# Patient Record
Sex: FEMALE | Race: WHITE | NOT HISPANIC OR LATINO | Employment: FULL TIME | URBAN - METROPOLITAN AREA
[De-identification: names, ages, dates, MRNs, and addresses within clinical notes are randomized per-mention and may not be internally consistent; named-entity substitution may affect disease eponyms.]

---

## 2017-03-27 ENCOUNTER — HOSPITAL ENCOUNTER (EMERGENCY)
Facility: HOSPITAL | Age: 42
Discharge: HOME | End: 2017-03-27
Attending: EMERGENCY MEDICINE
Payer: MEDICAID

## 2017-03-27 VITALS
DIASTOLIC BLOOD PRESSURE: 86 MMHG | WEIGHT: 120 LBS | TEMPERATURE: 98 F | BODY MASS INDEX: 22.08 KG/M2 | HEART RATE: 102 BPM | RESPIRATION RATE: 18 BRPM | HEIGHT: 62 IN | SYSTOLIC BLOOD PRESSURE: 142 MMHG | OXYGEN SATURATION: 99 %

## 2017-03-27 DIAGNOSIS — N39.0 URINARY TRACT INFECTION WITHOUT HEMATURIA, SITE UNSPECIFIED: ICD-10-CM

## 2017-03-27 DIAGNOSIS — F10.10 ETOH ABUSE: ICD-10-CM

## 2017-03-27 DIAGNOSIS — F32.A DEPRESSION, UNSPECIFIED DEPRESSION TYPE: Primary | ICD-10-CM

## 2017-03-27 LAB
ALBUMIN SERPL BCP-MCNC: 4.1 G/DL
ALP SERPL-CCNC: 107 U/L
ALT SERPL W/O P-5'-P-CCNC: 95 U/L
AMPHET+METHAMPHET UR QL: NEGATIVE
ANION GAP SERPL CALC-SCNC: 13 MMOL/L
APAP SERPL-MCNC: <3 UG/ML
AST SERPL-CCNC: 248 U/L
B-HCG UR QL: NEGATIVE
BACTERIA #/AREA URNS HPF: ABNORMAL /HPF
BARBITURATES UR QL SCN>200 NG/ML: NEGATIVE
BASOPHILS # BLD AUTO: 0.1 K/UL
BASOPHILS NFR BLD: 0.9 %
BENZODIAZ UR QL SCN>200 NG/ML: NEGATIVE
BILIRUB SERPL-MCNC: 0.4 MG/DL
BILIRUB UR QL STRIP: NEGATIVE
BUN SERPL-MCNC: 7 MG/DL
BZE UR QL SCN: NORMAL
CALCIUM SERPL-MCNC: 9.1 MG/DL
CANNABINOIDS UR QL SCN: NORMAL
CHLORIDE SERPL-SCNC: 106 MMOL/L
CLARITY UR: ABNORMAL
CO2 SERPL-SCNC: 25 MMOL/L
COLOR UR: YELLOW
CREAT SERPL-MCNC: 0.7 MG/DL
CREAT UR-MCNC: 101.8 MG/DL
CTP QC/QA: YES
DIFFERENTIAL METHOD: ABNORMAL
EOSINOPHIL # BLD AUTO: 0 K/UL
EOSINOPHIL NFR BLD: 0.6 %
ERYTHROCYTE [DISTWIDTH] IN BLOOD BY AUTOMATED COUNT: 13.1 %
EST. GFR  (AFRICAN AMERICAN): >60 ML/MIN/1.73 M^2
EST. GFR  (NON AFRICAN AMERICAN): >60 ML/MIN/1.73 M^2
ETHANOL SERPL-MCNC: 266 MG/DL
GLUCOSE SERPL-MCNC: 106 MG/DL
GLUCOSE UR QL STRIP: NEGATIVE
HCT VFR BLD AUTO: 46.6 %
HGB BLD-MCNC: 16.3 G/DL
HGB UR QL STRIP: ABNORMAL
KETONES UR QL STRIP: NEGATIVE
LEUKOCYTE ESTERASE UR QL STRIP: ABNORMAL
LYMPHOCYTES # BLD AUTO: 3.1 K/UL
LYMPHOCYTES NFR BLD: 51 %
MCH RBC QN AUTO: 35.3 PG
MCHC RBC AUTO-ENTMCNC: 34.9 %
MCV RBC AUTO: 101 FL
METHADONE UR QL SCN>300 NG/ML: NEGATIVE
MICROSCOPIC COMMENT: ABNORMAL
MONOCYTES # BLD AUTO: 0.6 K/UL
MONOCYTES NFR BLD: 10.3 %
NEUTROPHILS # BLD AUTO: 2.3 K/UL
NEUTROPHILS NFR BLD: 37.2 %
NITRITE UR QL STRIP: POSITIVE
OPIATES UR QL SCN: NORMAL
PCP UR QL SCN>25 NG/ML: NEGATIVE
PH UR STRIP: 7 [PH] (ref 5–8)
PLATELET # BLD AUTO: 264 K/UL
PMV BLD AUTO: 7.7 FL
POTASSIUM SERPL-SCNC: 3.2 MMOL/L
PROT SERPL-MCNC: 8 G/DL
PROT UR QL STRIP: ABNORMAL
RBC # BLD AUTO: 4.61 M/UL
RBC #/AREA URNS HPF: 5 /HPF (ref 0–4)
SODIUM SERPL-SCNC: 144 MMOL/L
SP GR UR STRIP: 1.01 (ref 1–1.03)
TOXICOLOGY INFORMATION: NORMAL
TSH SERPL DL<=0.005 MIU/L-ACNC: 0.6 UIU/ML
URN SPEC COLLECT METH UR: ABNORMAL
UROBILINOGEN UR STRIP-ACNC: ABNORMAL EU/DL
WBC # BLD AUTO: 6.1 K/UL
WBC #/AREA URNS HPF: 25 /HPF (ref 0–5)

## 2017-03-27 PROCEDURE — 99285 EMERGENCY DEPT VISIT HI MDM: CPT | Mod: 25

## 2017-03-27 PROCEDURE — 93005 ELECTROCARDIOGRAM TRACING: CPT

## 2017-03-27 PROCEDURE — 80329 ANALGESICS NON-OPIOID 1 OR 2: CPT

## 2017-03-27 PROCEDURE — 85025 COMPLETE CBC W/AUTO DIFF WBC: CPT

## 2017-03-27 PROCEDURE — 82570 ASSAY OF URINE CREATININE: CPT

## 2017-03-27 PROCEDURE — 81025 URINE PREGNANCY TEST: CPT | Performed by: EMERGENCY MEDICINE

## 2017-03-27 PROCEDURE — 81000 URINALYSIS NONAUTO W/SCOPE: CPT

## 2017-03-27 PROCEDURE — 80053 COMPREHEN METABOLIC PANEL: CPT

## 2017-03-27 PROCEDURE — 36415 COLL VENOUS BLD VENIPUNCTURE: CPT

## 2017-03-27 PROCEDURE — 80320 DRUG SCREEN QUANTALCOHOLS: CPT

## 2017-03-27 PROCEDURE — 84443 ASSAY THYROID STIM HORMONE: CPT

## 2017-03-27 RX ORDER — TRAZODONE HYDROCHLORIDE 100 MG/1
100 TABLET ORAL NIGHTLY
Qty: 30 TABLET | Refills: 1 | Status: SHIPPED | OUTPATIENT
Start: 2017-03-27 | End: 2018-05-09

## 2017-03-27 RX ORDER — CITALOPRAM 20 MG/1
20 TABLET, FILM COATED ORAL DAILY
Qty: 30 TABLET | Refills: 1 | Status: SHIPPED | OUTPATIENT
Start: 2017-03-27 | End: 2021-02-15

## 2017-03-27 RX ORDER — OLANZAPINE 20 MG/1
20 TABLET ORAL NIGHTLY
Qty: 30 TABLET | Refills: 1 | Status: SHIPPED | OUTPATIENT
Start: 2017-03-27 | End: 2018-05-09

## 2017-03-27 NOTE — ED PROVIDER NOTES
"Encounter Date: 3/27/2017       History     Chief Complaint   Patient presents with    Suicidal     Thoughts of being better off dead.  "i pray at night not to wake up".  Been off psych medications for over a year.   Admits to frequent audible and visual hallucinations      Review of patient's allergies indicates:  No Known Allergies  HPI   Patient is a 42-year-old woman who presents to emergency department for evaluation of depression, having anger outbursts and thinking she would be better off dead sometimes at night before going to bed.  She endorses a history of previous suicide attempts but denies any true suicidal ideation or plans.  She denies any homicidal ideation.  She does endorse alcohol abuse.  She admits to audible and visual hallucinations in the past but none currently.  Past Medical History:   Diagnosis Date    Anxiety     Bipolar 1 disorder     Depression     Major depression     Schizophrenia      History reviewed. No pertinent surgical history.  History reviewed. No pertinent family history.  Social History   Substance Use Topics    Smoking status: Current Every Day Smoker     Packs/day: 1.00    Smokeless tobacco: None    Alcohol use Yes     Review of Systems  REVIEW OF SYSTEMS  CONSTITUTIONAL: Negative for fever.  HEENT:  Negative for sore throat.   HEART:   Negative for chest pain..  LUNG:  Negative for shortness of breath.  ABDOMEN:  Negative for nausea.   :  No discharge, dysuria  EXTREMITIES:  No swelling  NEURO:  Negative for weakness.   SKIN:  Negative for rash.  Psych: Positive for depression, positive for suicidal thoughts.  Negative for plan.  HEME: Does not bruise/bleed easily.           Physical Exam   Initial Vitals   BP Pulse Resp Temp SpO2   03/27/17 0337 03/27/17 0337 03/27/17 0337 03/27/17 0337 03/27/17 0337   153/101 118 18 98.3 °F (36.8 °C) 99 %     Physical Exam  Physical Exam   Nursing note and vitals reviewed.   Constitutional: Oriented to person, place, and time. " Appears well-developed and well-nourished.  Intoxicated.  HENT:   Head: Normocephalic and atraumatic.   Eyes: EOM are normal.   Neck: Normal range of motion. Neck supple.   Cardiovascular: Normal rate.   Pulmonary/Chest: Effort normal. Clear BS b/l   Abdominal: Soft, Non tender, no distension.   Musculoskeletal: Normal range of motion.   Neurological:Alert and oriented to person, place, and time.   Psychiatric: Normal mood and affect.  Endorses passive suicidal ideations.  Denies plan.  States she does not think that she will hurt herself or anyone else and would like to get back on her psychiatric meds.  Appropriate insight.        ED Course   Procedures  Labs Reviewed   CBC W/ AUTO DIFFERENTIAL - Abnormal; Notable for the following:        Result Value    Hemoglobin 16.3 (*)      (*)     MCH 35.3 (*)     MPV 7.7 (*)     Gran% 37.2 (*)     Lymph% 51.0 (*)     All other components within normal limits   COMPREHENSIVE METABOLIC PANEL - Abnormal; Notable for the following:     Potassium 3.2 (*)      (*)     ALT 95 (*)     All other components within normal limits   URINALYSIS - Abnormal; Notable for the following:     Appearance, UA Hazy (*)     Protein, UA Trace (*)     Occult Blood UA 1+ (*)     Nitrite, UA Positive (*)     Urobilinogen, UA 2.0-3.0 (*)     Leukocytes, UA Trace (*)     All other components within normal limits   ALCOHOL,MEDICAL (ETHANOL) - Abnormal; Notable for the following:     Alcohol, Medical, Serum 266 (*)     All other components within normal limits   ACETAMINOPHEN LEVEL - Abnormal; Notable for the following:     Acetaminophen (Tylenol), Serum <3.0 (*)     All other components within normal limits   URINALYSIS MICROSCOPIC - Abnormal; Notable for the following:     RBC, UA 5 (*)     WBC, UA 25 (*)     Bacteria, UA Many (*)     All other components within normal limits   TSH   DRUG SCREEN PANEL, URINE EMERGENCY   POCT URINE PREGNANCY     EKG Readings: (Independently Interpreted)    Initial Reading: No STEMI.   Normal sinus rhythm, 94, normal ST segments, normal T waves, no acute ischemic changes appreciated.          Medical Decision Making:   History:   Old Medical Records: I decided to obtain old medical records.  Initial Assessment:   42-year-old woman presents with depression and passive suicidal ideations with alcohol intoxication.  Medical workup was initiated for psychiatric clearance and possible PEC.  However, patient came to the ER voluntarily and denies true suicidal ideation or plan.  She wants to restart taking her psychiatric medications.  She is informed that continuing use of alcohol is a depressant and can worsen her symptoms and states she will consider abstaining from alcohol.  She does not want to be PEC and states she will return if she has worsening of her suicidal thoughts.  I provided her with a prescription for her medications.  She is to follow-up with her therapist.  Return for any worsening symptoms.  I do not think she is truly suicidal, homicidal or gravely disabled.  She is discharged improved and in no acute distress.                   ED Course     Clinical Impression:   The primary encounter diagnosis was Depression, unspecified depression type. Diagnoses of ETOH abuse and Urinary tract infection without hematuria, site unspecified were also pertinent to this visit.          Harrison Cevallos MD  03/27/17 0752

## 2017-03-27 NOTE — DISCHARGE INSTRUCTIONS
"  Alcohol Abuse  Alcoholic drinks are harmful when you have too many of them. There is no set number of drinks that defines too much. Drinking that disrupts your life or your health is called alcohol abuse. Alcohol abuse can hurt your relationships with others. You may lose friends, a spouse, or even your job. You may be abusing alcohol if any of the following are true for you:  · Duties at home or with  suffer because of drinking.  · Duties at work or in school suffer because of drinking.  · You have missed work or school because of drinking.  · You use alcohol while driving or operating machinery.  · You have legal problems such as arrests due to drinking.  · You keep drinking even though it causes serious problems in your life.  Health effects  Alcohol abuse causes health problems. Sometimes this can happen after only drinking a little." There is no set number of drinks or amount of alcohol that defines too much. The more you drink at one time, and the more often you drink determine both the short-term and long-term health effects. It affects all parts of your body and your health, including your:  · Brain. Alcohol is a central nervous system depressant. It can damage parts of the brain that affect your balance, memory, thinking, and emotions. It can cause memory loss, blackouts, depression, agitation, sleep cycle changes, and seizures. These changes may or may not be reversible.  · Heart and vascular system. Alcohol affects multiple areas. It can damage heart muscle causing cardiomyopathy, which is a weakening and stretching of the heart muscle. This can lead to trouble breathing, an irregular heartbeat, atrial fibrillation, leg swelling, and heart failure. Alcohol use makes the blood vessels stiffen causing high blood pressure. All of these problems increase your risk of having heart attacks or strokes.  · Liver. Alcohol causes fat to build up in the liver, affecting its normal function. This " increases the risk for hepatitis, leading to abdominal pain, appetite loss, jaundice, bleeding problems, liver fibrosis, and cirrhosis. This, in turn, can affect your ability to fight off infections, and can cause diabetes. The liver changes prevent it from removing toxins in your blood that can cause encephalopathy, which may show with confusion, altered level of consciousness, personality changes, memory loss, seizures, coma, and death.  · Pancreas. Alcohol can cause inflammation of the pancreas, or pancreatitis. This can cause abdominal pain, fever, and diabetes.  · Immune system. Alcohol weakens your immune system in a number of ways. It suppresses your immune system making it harder to fight infections and colds. It also increases the chance of getting pneumonia and tuberculosis.  · Cancer. Alcohol is a risk factor for developing cancer of the mouth, esophagus, pharynx, larynx, liver, and breast.  · Sexual function. Alcohol can lead to sexual problems.  Home care  The following guidelines will help you deal with alcohol abuse:  · Admit you have a problem with alcohol.  · Ask for help from your healthcare provider and trusted family members or close friends.  · Get help from people trained in dealing with alcohol abuse. This may be individual counseling or group therapy, or it may be a supervised alcohol treatment program.  · Join a self-help group for alcohol abuse such as Alcoholics Anonymous (AA).  · Avoid people who abuse alcohol or tempt you to drink.  Follow-up care  Follow up as advised by the healthcare provider, or as advised. Contact these groups to get help:  · Alcoholics Anonymous (AA): Go to www.aa.org or check the phone book for meetings near you.  · National Alcohol and Substance Abuse Information Center (NASAIC): 887.286.4519 www.addictioncareoptions.com  · National [place] on Alcoholism and Drug Dependence (NCADD): 923-JFL-UGUS (205-3555) www.ncadd.org  Call 911  Call 911 if any of these  occur:  · Trouble breathing or slow irregular breathing  · Chest pain  · Sudden weakness on one side of your body or sudden trouble speaking  · Heavy bleeding or vomiting blood  · Very drowsy or trouble awakening  · Fainting or loss of consciousness  · Rapid heart rate  · Seizure  When to seek medical care  Call your healthcare provider right away if any of these occur:   · Confusion  · Hallucinations (seeing, hearing, or feeling things that arent there)  · Pain in your upper abdomen that gets worse  · Repeated vomiting or black or tarry stools  · Severe shakiness  Date Last Reviewed: 6/1/2016  © 1855-1337 [employer]. [address] [place] [address]. All rights reserved. This information is not intended as a substitute for professional medical care. Always follow your healthcare professional's instructions.

## 2017-03-27 NOTE — ED AVS SNAPSHOT
[facility] [address] [place] [address]               Carmen Landon   3/27/2017  3:33 AM   ED    Description:  Female : 1975   Department:  [facility]           Your Care was Coordinated By:     Provider Role From To    Harrison Cevallos MD Attending Provider 17 0338 --      Reason for Visit     Suicidal           Diagnoses this Visit        Comments    Depression, unspecified depression type    -  Primary     ETOH abuse         Urinary tract infection without hematuria, site unspecified           ED Disposition     None           To Do List           Follow-up Information     Schedule an appointment as soon as possible for a visit with [facility].    Specialties:  Physical Therapy, Behavioral Health, Geriatric Medicine    Contact information:    [address] [place] [address]  [address]  [facility] [address]  [phone]          Follow up with [facility].    Specialty:  Emergency Medicine    Why:  As needed, If symptoms worsen    Contact information:    [address] [place] [id number] [phone]       These Medications        Disp Refills Start End    citalopram (CELEXA) 20 MG tablet 30 tablet 1 3/27/2017 3/27/2018    Take 1 tablet (20 mg total) by mouth once daily. - Oral    olanzapine (ZYPREXA) 20 MG tablet 30 tablet 1 3/27/2017 3/27/2018    Take 1 tablet (20 mg total) by mouth every evening. - Oral    trazodone (DESYREL) 100 MG tablet 30 tablet 1 3/27/2017 3/27/2018    Take 1 tablet (100 mg total) by mouth every evening. - Oral      [facility]     [facility] -  Assistance  Registered nurses in the [facility] provide clinical advisement, health education, appointment booking, and other advisory services.  Call for this free service at [phone].             Medications           Message regarding Medications      "Verify the changes and/or additions to your medication regime listed below are the same as discussed with your clinician today.  If any of these changes or additions are incorrect, please notify your healthcare provider.        START taking these NEW medications        Refills    citalopram (CELEXA) 20 MG tablet 1    Sig: Take 1 tablet (20 mg total) by mouth once daily.    Class: Print    Route: Oral    olanzapine (ZYPREXA) 20 MG tablet 1    Sig: Take 1 tablet (20 mg total) by mouth every evening.    Class: Print    Route: Oral    trazodone (DESYREL) 100 MG tablet 1    Sig: Take 1 tablet (100 mg total) by mouth every evening.    Class: Print    Route: Oral           Verify that the below list of medications is an accurate representation of the medications you are currently taking.  If none reported, the list may be blank. If incorrect, please contact your healthcare provider. Carry this list with you in case of emergency.           Current Medications     citalopram (CELEXA) 20 MG tablet Take 1 tablet (20 mg total) by mouth once daily.    olanzapine (ZYPREXA) 20 MG tablet Take 1 tablet (20 mg total) by mouth every evening.    trazodone (DESYREL) 100 MG tablet Take 1 tablet (100 mg total) by mouth every evening.           Clinical Reference Information           Your Vitals Were     BP Pulse Temp Resp Height Weight    153/101 (BP Location: Right arm, Patient Position: Sitting) 118 98.3 °F (36.8 °C) (Oral) 18 5' 2" (1.575 m) 54.4 kg (120 lb)    SpO2 BMI             99% 21.95 kg/m2         Allergies as of 3/27/2017     No Known Allergies      Immunizations Administered on Date of Encounter - 3/27/2017     None      ED Micro, Lab, POCT     Start Ordered       Status Ordering Provider    03/27/17 0344 03/27/17 0344  CBC auto differential  STAT      Final result     03/27/17 0344 03/27/17 0344  Comprehensive metabolic panel  STAT      Final result     03/27/17 0344 03/27/17 0344  TSH  STAT      In process     03/27/17 " "0344 03/27/17 0344  Urinalysis - clean catch  STAT      Final result     03/27/17 0344 03/27/17 0344  Drug screen panel, emergency  STAT      Final result     03/27/17 0344 03/27/17 0344  Ethanol  STAT      Final result     03/27/17 0344 03/27/17 0344  Acetaminophen level  STAT      Final result     03/27/17 0344 03/27/17 0344  POCT urine pregnancy  Once      Final result     03/27/17 0344 03/27/17 0344  Urinalysis Microscopic  Once      Final result       ED Imaging Orders     Start Ordered       Status Ordering Provider    03/27/17 0345 03/27/17 0344  X-Ray Chest PA And Lateral  1 time imaging      In process         Discharge Instructions         Alcohol Abuse  Alcoholic drinks are harmful when you have too many of them. There is no set number of drinks that defines too much. Drinking that disrupts your life or your health is called alcohol abuse. Alcohol abuse can hurt your relationships with others. You may lose friends, a spouse, or even your job. You may be abusing alcohol if any of the following are true for you:  · Duties at home or with  suffer because of drinking.  · Duties at work or in school suffer because of drinking.  · You have missed work or school because of drinking.  · You use alcohol while driving or operating machinery.  · You have legal problems such as arrests due to drinking.  · You keep drinking even though it causes serious problems in your life.  Health effects  Alcohol abuse causes health problems. Sometimes this can happen after only drinking a little." There is no set number of drinks or amount of alcohol that defines too much. The more you drink at one time, and the more often you drink determine both the short-term and long-term health effects. It affects all parts of your body and your health, including your:  · Brain. Alcohol is a central nervous system depressant. It can damage parts of the brain that affect your balance, memory, thinking, and emotions. It can cause " memory loss, blackouts, depression, agitation, sleep cycle changes, and seizures. These changes may or may not be reversible.  · Heart and vascular system. Alcohol affects multiple areas. It can damage heart muscle causing cardiomyopathy, which is a weakening and stretching of the heart muscle. This can lead to trouble breathing, an irregular heartbeat, atrial fibrillation, leg swelling, and heart failure. Alcohol use makes the blood vessels stiffen causing high blood pressure. All of these problems increase your risk of having heart attacks or strokes.  · Liver. Alcohol causes fat to build up in the liver, affecting its normal function. This increases the risk for hepatitis, leading to abdominal pain, appetite loss, jaundice, bleeding problems, liver fibrosis, and cirrhosis. This, in turn, can affect your ability to fight off infections, and can cause diabetes. The liver changes prevent it from removing toxins in your blood that can cause encephalopathy, which may show with confusion, altered level of consciousness, personality changes, memory loss, seizures, coma, and death.  · Pancreas. Alcohol can cause inflammation of the pancreas, or pancreatitis. This can cause abdominal pain, fever, and diabetes.  · Immune system. Alcohol weakens your immune system in a number of ways. It suppresses your immune system making it harder to fight infections and colds. It also increases the chance of getting pneumonia and tuberculosis.  · Cancer. Alcohol is a risk factor for developing cancer of the mouth, esophagus, pharynx, larynx, liver, and breast.  · Sexual function. Alcohol can lead to sexual problems.  Home care  The following guidelines will help you deal with alcohol abuse:  · Admit you have a problem with alcohol.  · Ask for help from your healthcare provider and trusted family members or close friends.  · Get help from people trained in dealing with alcohol abuse. This may be individual counseling or group therapy, or  it may be a supervised alcohol treatment program.  · Join a self-help group for alcohol abuse such as Alcoholics Anonymous (AA).  · Avoid people who abuse alcohol or tempt you to drink.  Follow-up care  Follow up as advised by the healthcare provider, or as advised. Contact these groups to get help:  · Alcoholics Anonymous (AA): Go to www.aa.org or check the phone book for meetings near you.  · National Alcohol and Substance Abuse Information Center (NASAIC): 380.669.5518 www.addictioncareThirdSpaceLearning.[employer]  · National [place] on Alcoholism and Drug Dependence (NCADD): 336-RDI-GORY (809-7916) www.ncadd.org  Call 911  Call 911 if any of these occur:  · Trouble breathing or slow irregular breathing  · Chest pain  · Sudden weakness on one side of your body or sudden trouble speaking  · Heavy bleeding or vomiting blood  · Very drowsy or trouble awakening  · Fainting or loss of consciousness  · Rapid heart rate  · Seizure  When to seek medical care  Call your healthcare provider right away if any of these occur:   · Confusion  · Hallucinations (seeing, hearing, or feeling things that arent there)  · Pain in your upper abdomen that gets worse  · Repeated vomiting or black or tarry stools  · Severe shakiness  Date Last Reviewed: 6/1/2016  © 9247-5994 [employer]. [address] [place] [address]. All rights reserved. This information is not intended as a substitute for professional medical care. Always follow your healthcare professional's instructions.          Discharge References/Attachments     DEPRESSION (ENGLISH)    DEPRESSION: TIPS TO HELP YOURSELF  (ENGLISH)      [facility] Sign-Up     Activating your [facility] account is as easy as 1-2-3!     1) Visit [email or web address], select Sign Up Now, enter this activation code and your date of birth, then select Next.  [email or web address]  Expires: 5/11/2017  5:09 AM      2) Create a username and password to use when you visit [facility] in the future and select a  security question in case you lose your password and select Next.    3) Enter your e-mail address and click Sign Up!    Additional Information  If you have questions, please e-mail [email or web address] or call [phone] to talk to our [facility] staff. Remember, [facility] is NOT to be used for urgent needs. For medical emergencies, dial 911.         Smoking Cessation     If you would like to quit smoking:   You may be eligible for free services if you are a [place] resident and started smoking cigarettes before September 1, 1988.  Call the Smoking Cessation Trust (SCT) toll free at (469) 397-3394 or (423) 969-7136.   Call 2-386-QUIT-NOW if you do not meet the above criteria.             [facility] complies with applicable Federal civil rights laws and does not discriminate on the basis of race, color, national origin, age, disability, or sex.        Language Assistance Services     ATTENTION: Language assistance services are available, free of charge. Please call [phone].      ATENCIÓN: Si habla español, tiene a porter disposición servicios gratuitos de asistencia lingüística. Llame al [phone].     CHÚ Ý: N?u b?n nói Ti?ng Vi?t, có các d?ch v? h? tr? ngôn ng? mi?n phí dành cho b?n. G?i s? [phone].

## 2017-06-29 ENCOUNTER — HOSPITAL ENCOUNTER (EMERGENCY)
Facility: HOSPITAL | Age: 42
Discharge: HOME | End: 2017-06-29
Attending: EMERGENCY MEDICINE
Payer: MEDICAID

## 2017-06-29 VITALS
SYSTOLIC BLOOD PRESSURE: 116 MMHG | BODY MASS INDEX: 23.37 KG/M2 | DIASTOLIC BLOOD PRESSURE: 70 MMHG | HEIGHT: 62 IN | RESPIRATION RATE: 16 BRPM | OXYGEN SATURATION: 94 % | TEMPERATURE: 99 F | WEIGHT: 127 LBS | HEART RATE: 82 BPM

## 2017-06-29 DIAGNOSIS — F10.920 ALCOHOL INTOXICATION, UNCOMPLICATED: Primary | ICD-10-CM

## 2017-06-29 LAB
ALBUMIN SERPL BCP-MCNC: 4 G/DL
ALP SERPL-CCNC: 87 U/L
ALT SERPL W/O P-5'-P-CCNC: 66 U/L
ANION GAP SERPL CALC-SCNC: 15 MMOL/L
AST SERPL-CCNC: 155 U/L
BASOPHILS # BLD AUTO: 0 K/UL
BASOPHILS NFR BLD: 0.1 %
BILIRUB SERPL-MCNC: 0.3 MG/DL
BUN SERPL-MCNC: 8 MG/DL
CALCIUM SERPL-MCNC: 9.1 MG/DL
CHLORIDE SERPL-SCNC: 106 MMOL/L
CO2 SERPL-SCNC: 22 MMOL/L
CREAT SERPL-MCNC: 0.7 MG/DL
DIFFERENTIAL METHOD: ABNORMAL
EOSINOPHIL # BLD AUTO: 0.1 K/UL
EOSINOPHIL NFR BLD: 0.8 %
ERYTHROCYTE [DISTWIDTH] IN BLOOD BY AUTOMATED COUNT: 11.8 %
EST. GFR  (AFRICAN AMERICAN): >60 ML/MIN/1.73 M^2
EST. GFR  (NON AFRICAN AMERICAN): >60 ML/MIN/1.73 M^2
GLUCOSE SERPL-MCNC: 78 MG/DL
HCT VFR BLD AUTO: 42.2 %
HGB BLD-MCNC: 14.6 G/DL
LYMPHOCYTES # BLD AUTO: 4.1 K/UL
LYMPHOCYTES NFR BLD: 52.5 %
MCH RBC QN AUTO: 35.4 PG
MCHC RBC AUTO-ENTMCNC: 34.6 %
MCV RBC AUTO: 102 FL
MONOCYTES # BLD AUTO: 0.5 K/UL
MONOCYTES NFR BLD: 6.7 %
NEUTROPHILS # BLD AUTO: 3.1 K/UL
NEUTROPHILS NFR BLD: 39.9 %
PLATELET # BLD AUTO: 296 K/UL
PMV BLD AUTO: 7.2 FL
POTASSIUM SERPL-SCNC: 3.5 MMOL/L
PROT SERPL-MCNC: 8 G/DL
RBC # BLD AUTO: 4.12 M/UL
SODIUM SERPL-SCNC: 143 MMOL/L
WBC # BLD AUTO: 7.8 K/UL

## 2017-06-29 PROCEDURE — 25000003 PHARM REV CODE 250: Performed by: EMERGENCY MEDICINE

## 2017-06-29 PROCEDURE — 96360 HYDRATION IV INFUSION INIT: CPT

## 2017-06-29 PROCEDURE — 85025 COMPLETE CBC W/AUTO DIFF WBC: CPT

## 2017-06-29 PROCEDURE — 80053 COMPREHEN METABOLIC PANEL: CPT

## 2017-06-29 PROCEDURE — 36415 COLL VENOUS BLD VENIPUNCTURE: CPT

## 2017-06-29 PROCEDURE — 96361 HYDRATE IV INFUSION ADD-ON: CPT

## 2017-06-29 PROCEDURE — 99284 EMERGENCY DEPT VISIT MOD MDM: CPT | Mod: 25

## 2017-06-29 PROCEDURE — 63600175 PHARM REV CODE 636 W HCPCS: Performed by: EMERGENCY MEDICINE

## 2017-06-29 RX ADMIN — FOLIC ACID 1000 ML/HR: 5 INJECTION, SOLUTION INTRAMUSCULAR; INTRAVENOUS; SUBCUTANEOUS at 02:06

## 2017-06-29 NOTE — ED NOTES
Pt presents to ER via EMS wanting to go to rehab. Pt states she drinks whiskey or tequila everyday. Today pt admits to drinking 4 pints of whiskey. Pt is bipolar and schizophrenic and reports being compliant with meds. Pt is upset, laying in bed, side rails up. Will continue to monitor.

## 2017-06-29 NOTE — ED PROVIDER NOTES
Encounter Date: 6/29/2017       History     Chief Complaint   Patient presents with    Alcohol Problem     wants to be sent to rehab for alcohol addiction.      Chief complaint: I need rehabilitation    History of present illness:Carmen Landon is a 42 y.o. female who presents via ambulance after excessive alcohol consumption requesting rehabilitation.  She reports that she consumes excessive amounts of alcohol daily has lost her job.  She denies any suicidal or homicidal ideation.  Her past medical history is significant for schizophrenia although she denies any hallucinations or delusions and has no suicidal ideation.          Review of patient's allergies indicates:  No Known Allergies  Past Medical History:   Diagnosis Date    Anxiety     Bipolar 1 disorder     Depression     Major depression     Schizophrenia      History reviewed. No pertinent surgical history.  History reviewed. No pertinent family history.  Social History   Substance Use Topics    Smoking status: Current Every Day Smoker     Packs/day: 1.00    Smokeless tobacco: Never Used    Alcohol use Yes      Comment: 4 pints of whiskey or tequila daily     Review of Systems   Constitutional: Negative for activity change, appetite change, chills, fatigue and fever.   Eyes: Negative for visual disturbance.   Respiratory: Negative for apnea and shortness of breath.    Cardiovascular: Negative for chest pain and palpitations.   Gastrointestinal: Negative for abdominal distention and abdominal pain.   Genitourinary: Negative for difficulty urinating.   Musculoskeletal: Negative for neck pain.   Skin: Negative for pallor and rash.   Neurological: Negative for headaches.   Hematological: Does not bruise/bleed easily.   Psychiatric/Behavioral: Negative for agitation, behavioral problems, confusion, decreased concentration, dysphoric mood and hallucinations. The patient is not nervous/anxious and is not hyperactive.        Physical Exam      Initial Vitals [06/29/17 0144]   BP Pulse Resp Temp SpO2   (!) 138/108 90 16 98.8 °F (37.1 °C) 99 %      MAP       118         Physical Exam    Nursing note and vitals reviewed.  Constitutional: She appears well-developed and well-nourished.   HENT:   Head: Normocephalic and atraumatic.   Sclera injected with horizontal nystagmus   Eyes: Conjunctivae are normal.   Neck: Normal range of motion. Neck supple.   Cardiovascular: Normal rate.   Pulmonary/Chest: Effort normal and breath sounds normal. No respiratory distress.   Abdominal: Soft. She exhibits no distension. There is no tenderness.   Musculoskeletal: Normal range of motion.   Neurological: She is alert and oriented to person, place, and time.   Skin: Skin is warm and dry. No erythema.   Psychiatric: She has a normal mood and affect.         ED Course   Procedures  Labs Reviewed   CBC W/ AUTO DIFFERENTIAL - Abnormal; Notable for the following:        Result Value     (*)     MCH 35.4 (*)     MPV 7.2 (*)     Lymph% 52.5 (*)     All other components within normal limits   COMPREHENSIVE METABOLIC PANEL - Abnormal; Notable for the following:     CO2 22 (*)      (*)     ALT 66 (*)     All other components within normal limits             Medical Decision Making:   ED Management:  Carmen Landon is a 42 y.o. female who presents with  request for a detoxification program.  She agrees to follow-up as an outpatient and denies any suicidal or homicidal ideation.                   ED Course     Clinical Impression:   The encounter diagnosis was Alcohol intoxication, uncomplicated.                           Carlos Bland III, MD  06/29/17 0600

## 2018-05-09 ENCOUNTER — OFFICE VISIT (OUTPATIENT)
Dept: FAMILY MEDICINE | Facility: CLINIC | Age: 43
End: 2018-05-09
Payer: MEDICAID

## 2018-05-09 VITALS
HEIGHT: 62 IN | BODY MASS INDEX: 23.37 KG/M2 | HEART RATE: 81 BPM | DIASTOLIC BLOOD PRESSURE: 74 MMHG | WEIGHT: 127 LBS | SYSTOLIC BLOOD PRESSURE: 112 MMHG | OXYGEN SATURATION: 98 %

## 2018-05-09 DIAGNOSIS — Z00.00 ANNUAL PHYSICAL EXAM: Primary | ICD-10-CM

## 2018-05-09 DIAGNOSIS — Z13.220 LIPID SCREENING: ICD-10-CM

## 2018-05-09 DIAGNOSIS — Z01.411 ENCOUNTER FOR GYNECOLOGICAL EXAMINATION WITH ABNORMAL FINDING: ICD-10-CM

## 2018-05-09 DIAGNOSIS — F17.200 CURRENT SMOKER: ICD-10-CM

## 2018-05-09 PROCEDURE — 99396 PREV VISIT EST AGE 40-64: CPT | Mod: ,,, | Performed by: NURSE PRACTITIONER

## 2018-05-09 NOTE — PROGRESS NOTES
HPI: Carmen Landon  is a 43 y.o. female who presents for annual physical .  No complaints at this time.     Review of Systems   Constitutional: Negative for activity change, appetite change and fever.   HENT: Negative for congestion, ear discharge, ear pain, sore throat, trouble swallowing and voice change.    Eyes: Negative for photophobia, pain, discharge and visual disturbance.   Respiratory: Negative for cough, chest tightness and shortness of breath.    Cardiovascular: Negative for chest pain and palpitations.   Gastrointestinal: Negative for abdominal pain, nausea and vomiting.   Endocrine: Negative for cold intolerance and heat intolerance.   Genitourinary: Positive for vaginal bleeding (Irregular spotting). Negative for difficulty urinating and dysuria.   Musculoskeletal: Negative for arthralgias and gait problem.   Skin: Negative for rash.   Allergic/Immunologic: Negative for immunocompromised state.   Neurological: Negative for speech difficulty and headaches.   Psychiatric/Behavioral: Negative for confusion, self-injury and suicidal ideas.     Review of patient's allergies indicates:  No Known Allergies  Past Medical History:   Diagnosis Date    Allergy     Anemia     Anxiety     Bipolar 1 disorder     Depression     Major depression     Meningitis     Schizophrenia      Past Surgical History:   Procedure Laterality Date    TUBAL LIGATION       Family History   Problem Relation Age of Onset    Alcohol abuse Mother     Kidney disease Mother     Heart disease Father      Social History   Substance Use Topics    Smoking status: Current Every Day Smoker     Packs/day: 0.50     Types: Cigarettes    Smokeless tobacco: Never Used    Alcohol use Yes      Comment: drinks beer daily      Health Maintenance Topics with due status: Not Due       Topic Last Completion Date    Influenza Vaccine Not Due     Immunization History   Administered Date(s) Administered    Tetanus 01/01/2010      OBJECTIVE:      Vitals:    05/09/18 1421   BP: 112/74   Pulse: 81     Physical Exam   Constitutional: She is oriented to person, place, and time. She appears well-developed. She is cooperative. No distress.   HENT:   Head: Normocephalic and atraumatic.   Right Ear: Tympanic membrane normal.   Left Ear: Tympanic membrane normal.   Eyes: Conjunctivae, EOM and lids are normal. Pupils are equal, round, and reactive to light. Lids are everted and swept, no foreign bodies found. Right pupil is round and reactive. Left pupil is round and reactive.   Neck: Trachea normal and normal range of motion. Neck supple.   Cardiovascular: Normal rate, regular rhythm, S1 normal, S2 normal, normal heart sounds and intact distal pulses.    No murmur heard.  Pulmonary/Chest: Effort normal and breath sounds normal. No respiratory distress.   Abdominal: Soft. Bowel sounds are normal. There is no rigidity and no guarding.   Musculoskeletal: Normal range of motion.   Lymphadenopathy:     She has no cervical adenopathy.     She has no axillary adenopathy.   Neurological: She is alert and oriented to person, place, and time.   Skin: Skin is warm and dry. Capillary refill takes less than 2 seconds.   Psychiatric: She has a normal mood and affect. Her behavior is normal. Judgment and thought content normal.   Nursing note and vitals reviewed.     Assessment:       1. Annual physical exam    2. Encounter for gynecological examination with abnormal finding    3. Lipid screening    4. Current smoker        Plan:       Annual physical exam  -     Comprehensive metabolic panel; Future; Expected date: 05/09/2018    Encounter for gynecological examination with abnormal finding  -     Ambulatory referral to Obstetrics / Gynecology    Lipid screening  -     Lipid panel; Future; Expected date: 05/09/2018    Current smoker        Patient counseled on age appropriate medical preventative services, age appropriate cancer screenings, nutrition, healthy  diet, consistent exercise regimen and maintaining an active lifestyle.      Patient encouraged in smoking cessation.    Referral to GYN complete for routine exam.  Patient does states she has had some irregular menstrual bleeding and has not had a gynecological exam in over 10 years.    Patient states last tetanus was in 2010.  Mammogram needed as patient has never had this screening exam.  Informed of standing orders at [facility] imaging.  Patient instructed to call for an appointment.  Patient verbalized understanding.    Counseled on age appropriate vaccines and discussed upcoming health care needs based on age/gender.  Spent time with patient counseling on need for a good patient/doctor relationship moving forward.  Discussed use of common OTC medications and supplements.  Discussed common dietary aids and use of caffeine and the need for good sleep hygiene and stress management.    Follow-up in about 1 year (around 5/9/2019) for annual physical exam.      5/9/2018 CM Holcomb, FNP-C

## 2018-05-09 NOTE — PATIENT INSTRUCTIONS
Prevention Guidelines, Women Ages 40 to 49  Screening tests and vaccines are an important part of managing your health. Health counseling is essential, too. Below are guidelines for these, for women ages 40 to 49. Talk with your healthcare provider to make sure youre up-to-date on what you need.  Screening Who needs it How often   Type 2 diabetes or prediabetes All adults beginning at age 45 and adults without symptoms at any age who are overweight or obese and have 1 or more additional risk factors for diabetes At least every 3 years   Alcohol misuse All women in this age group At routine exams   Blood pressure All women in this age group Every 2 years if your blood pressure is less than 120/80 mm Hg; yearly if your systolic blood pressure is 120 to 139 mm Hg, or your diastolic blood pressure reading is 80 to 89 mm Hg   Breast cancer All women in this age group Yearly mammogram and clinical breast exam2  Each woman should make her own decision. If a woman decides to have mammograms before age 50, they should be done every 2 years.3   Cervical cancer All women in this age group, except women who have had a complete hysterectomy Pap test every 3 years or Pap test plus human papilloma virus (HPV) test every 5 years   Chlamydia Women at increased risk for infection At routine exams if you're at risk or have symptoms   Depression All women in this age group At routine exams   Gonorrhea Sexually active women at increased risk for infection At routine exams   Hepatitis C Anyone at increased risk; 1 time for those born between 1945 and 1965 At routine exams   High cholesterol or triglycerides All women ages 45 and older who are at risk for coronary artery disease; younger women, talk with your healthcare provider At least every 5 years   HIV All women At routine exams   Obesity All women in this age group At routine exams   Syphilis Women at increased risk for infection - talk with your healthcare provider At routine  exams   Tuberculosis Women at increased risk for infection - talk with your healthcare provider Ask your healthcare provider   Vision All women in this age group Complete exam at age 40 and eye exams every 2 to 4 years. If you have a chronic disease, ask your healthcare provider how often you should have your eyes examined.4   Vaccine Who needs it How often   Chickenpox (varicella) All women in this age group who have no record of this infection or vaccine 2 doses; the second dose should be given at least 4 weeks after the first dose   Hepatitis A Women at increased risk for infection - talk with your healthcare provider 2 doses given 6 months apart   Hepatitis B Women at increased risk for infection - talk with your healthcare provider 3 doses over 6 months; second dose should be given 1 month after the first dose; the third dose should be given at least 2 months after the second dose and at least 4 months after the first dose   Haemophilus influenzae Type B (HIB) Women at increased risk 1 to 3 doses   Influenza (flu) All women in this age group Once a year   Measles, mumps, rubella (MMR) All women in this age group who have no record of these infections or vaccines 1 or 2 doses   Meningococcal Women at increased risk for infection - talk with your healthcare provider 1 or more doses   Pneumococcal conjugate vaccine (PCV13) and pneumococcal polysaccharide vaccine (PPSV23) Women at increased risk for infection - talk with your healthcare provider 1 or 2 doses   Tetanus/diphtheria/pertussis (Td/Tdap) booster All women in this age group A one-time dose of Tdap instead of a Td booster after age 18, then Td every 10 years   Counseling Who needs it How often   BRCA gene mutation testing for breast and ovarian cancer susceptibility Women with increased risk for having gene mutation When your risk is known   Breast cancer and chemoprevention Women at high risk for breast cancer When your risk is known   Diet and exercise  Women who are overweight or obese When diagnosed, and then at routine exams   Domestic violence Women at the age in which they are able to have children At routine exams   Sexually transmitted infection prevention Women at increased risk for infection - talk with your healthcare provider At routine exams   Use of tobacco and the health effects it can cause All women in this age group Every exam   1[facility] Diabetes Association  2American Cancer Society  3[place].S. Preventive Services Task Force  4[facility] Academy of Ophthalmology  Date Last Reviewed: 8/27/2015  © 2832-3246 [employer]. [address] [place] [address], [place] [id number]. All rights reserved. This information is not intended as a substitute for professional medical care. Always follow your healthcare professional's instructions.

## 2018-06-04 LAB
ALBUMIN SERPL-MCNC: 4.3 G/DL (ref 3.1–4.7)
ALP SERPL-CCNC: 59 IU/L (ref 40–104)
ALT (SGPT): 20 IU/L (ref 3–33)
AST SERPL-CCNC: 38 IU/L (ref 10–40)
BILIRUB SERPL-MCNC: 1.1 MG/DL (ref 0.3–1)
BUN SERPL-MCNC: 13 MG/DL (ref 8–20)
CALCIUM SERPL-MCNC: 9.6 MG/DL (ref 7.7–10.4)
CHLORIDE: 101 MMOL/L (ref 98–110)
CO2 SERPL-SCNC: 28.8 MMOL/L (ref 22.8–31.6)
CREATININE: 0.76 MG/DL (ref 0.6–1.4)
GLUCOSE: 93 MG/DL (ref 70–99)
POTASSIUM SERPL-SCNC: 4.2 MMOL/L (ref 3.5–5)
PROT SERPL-MCNC: 7.5 G/DL (ref 6–8.2)
SODIUM: 139 MMOL/L (ref 134–144)

## 2018-06-05 ENCOUNTER — TELEPHONE (OUTPATIENT)
Dept: FAMILY MEDICINE | Facility: CLINIC | Age: 43
End: 2018-06-05

## 2020-10-08 ENCOUNTER — HOSPITAL ENCOUNTER (EMERGENCY)
Facility: HOSPITAL | Age: 45
Discharge: HOME | End: 2020-10-08
Attending: EMERGENCY MEDICINE
Payer: MEDICAID

## 2020-10-08 VITALS
HEART RATE: 92 BPM | TEMPERATURE: 98 F | HEIGHT: 62 IN | RESPIRATION RATE: 20 BRPM | BODY MASS INDEX: 24.84 KG/M2 | OXYGEN SATURATION: 98 % | DIASTOLIC BLOOD PRESSURE: 78 MMHG | WEIGHT: 135 LBS | SYSTOLIC BLOOD PRESSURE: 122 MMHG

## 2020-10-08 DIAGNOSIS — F41.9 ANXIETY: Primary | ICD-10-CM

## 2020-10-08 DIAGNOSIS — E87.6 HYPOKALEMIA: ICD-10-CM

## 2020-10-08 DIAGNOSIS — R79.89 ELEVATED LFTS: ICD-10-CM

## 2020-10-08 LAB
ALBUMIN SERPL BCP-MCNC: 3.7 G/DL (ref 3.5–5.2)
ALP SERPL-CCNC: 168 U/L (ref 55–135)
ALT SERPL W/O P-5'-P-CCNC: 89 U/L (ref 10–44)
ANION GAP SERPL CALC-SCNC: 16 MMOL/L (ref 8–16)
AST SERPL-CCNC: 436 U/L (ref 10–40)
B-HCG UR QL: NEGATIVE
BASOPHILS # BLD AUTO: 0.09 K/UL (ref 0–0.2)
BASOPHILS NFR BLD: 1.6 % (ref 0–1.9)
BILIRUB SERPL-MCNC: 1.3 MG/DL (ref 0.1–1)
BILIRUB UR QL STRIP: NEGATIVE
BUN SERPL-MCNC: 4 MG/DL (ref 6–20)
CALCIUM SERPL-MCNC: 8.2 MG/DL (ref 8.7–10.5)
CHLORIDE SERPL-SCNC: 100 MMOL/L (ref 95–110)
CLARITY UR: CLEAR
CO2 SERPL-SCNC: 29 MMOL/L (ref 23–29)
COLOR UR: YELLOW
CREAT SERPL-MCNC: 0.6 MG/DL (ref 0.5–1.4)
CTP QC/QA: YES
DIFFERENTIAL METHOD: ABNORMAL
EOSINOPHIL # BLD AUTO: 0 K/UL (ref 0–0.5)
EOSINOPHIL NFR BLD: 0.7 % (ref 0–8)
ERYTHROCYTE [DISTWIDTH] IN BLOOD BY AUTOMATED COUNT: 13.6 % (ref 11.5–14.5)
EST. GFR  (AFRICAN AMERICAN): >60 ML/MIN/1.73 M^2
EST. GFR  (NON AFRICAN AMERICAN): >60 ML/MIN/1.73 M^2
GLUCOSE SERPL-MCNC: 97 MG/DL (ref 70–110)
GLUCOSE UR QL STRIP: NEGATIVE
HCT VFR BLD AUTO: 36.7 % (ref 37–48.5)
HGB BLD-MCNC: 12.7 G/DL (ref 12–16)
HGB UR QL STRIP: NEGATIVE
IMM GRANULOCYTES # BLD AUTO: 0.01 K/UL (ref 0–0.04)
IMM GRANULOCYTES NFR BLD AUTO: 0.2 % (ref 0–0.5)
KETONES UR QL STRIP: NEGATIVE
LEUKOCYTE ESTERASE UR QL STRIP: NEGATIVE
LIPASE SERPL-CCNC: 102 U/L (ref 4–60)
LYMPHOCYTES # BLD AUTO: 2.1 K/UL (ref 1–4.8)
LYMPHOCYTES NFR BLD: 37.6 % (ref 18–48)
MAGNESIUM SERPL-MCNC: 1.6 MG/DL (ref 1.6–2.6)
MCH RBC QN AUTO: 36.2 PG (ref 27–31)
MCHC RBC AUTO-ENTMCNC: 34.6 G/DL (ref 32–36)
MCV RBC AUTO: 105 FL (ref 82–98)
MONOCYTES # BLD AUTO: 0.5 K/UL (ref 0.3–1)
MONOCYTES NFR BLD: 8.9 % (ref 4–15)
NEUTROPHILS # BLD AUTO: 2.9 K/UL (ref 1.8–7.7)
NEUTROPHILS NFR BLD: 51 % (ref 38–73)
NITRITE UR QL STRIP: NEGATIVE
NRBC BLD-RTO: 0 /100 WBC
PH UR STRIP: 7 [PH] (ref 5–8)
PLATELET # BLD AUTO: 227 K/UL (ref 150–350)
PMV BLD AUTO: 9.4 FL (ref 9.2–12.9)
POTASSIUM SERPL-SCNC: 2.6 MMOL/L (ref 3.5–5.1)
PROT SERPL-MCNC: 8 G/DL (ref 6–8.4)
PROT UR QL STRIP: NEGATIVE
RBC # BLD AUTO: 3.51 M/UL (ref 4–5.4)
SODIUM SERPL-SCNC: 145 MMOL/L (ref 136–145)
SP GR UR STRIP: 1 (ref 1–1.03)
URN SPEC COLLECT METH UR: ABNORMAL
UROBILINOGEN UR STRIP-ACNC: ABNORMAL EU/DL
WBC # BLD AUTO: 5.64 K/UL (ref 3.9–12.7)

## 2020-10-08 PROCEDURE — 93005 ELECTROCARDIOGRAM TRACING: CPT

## 2020-10-08 PROCEDURE — 81025 URINE PREGNANCY TEST: CPT | Performed by: EMERGENCY MEDICINE

## 2020-10-08 PROCEDURE — 96361 HYDRATE IV INFUSION ADD-ON: CPT

## 2020-10-08 PROCEDURE — 81003 URINALYSIS AUTO W/O SCOPE: CPT

## 2020-10-08 PROCEDURE — 25000003 PHARM REV CODE 250: Performed by: EMERGENCY MEDICINE

## 2020-10-08 PROCEDURE — 80053 COMPREHEN METABOLIC PANEL: CPT

## 2020-10-08 PROCEDURE — 99285 EMERGENCY DEPT VISIT HI MDM: CPT | Mod: 25

## 2020-10-08 PROCEDURE — 63600175 PHARM REV CODE 636 W HCPCS: Performed by: EMERGENCY MEDICINE

## 2020-10-08 PROCEDURE — 85025 COMPLETE CBC W/AUTO DIFF WBC: CPT

## 2020-10-08 PROCEDURE — 96374 THER/PROPH/DIAG INJ IV PUSH: CPT | Mod: 59

## 2020-10-08 PROCEDURE — 83735 ASSAY OF MAGNESIUM: CPT

## 2020-10-08 PROCEDURE — 93010 ELECTROCARDIOGRAM REPORT: CPT | Mod: ,,, | Performed by: INTERNAL MEDICINE

## 2020-10-08 PROCEDURE — 93010 EKG 12-LEAD: ICD-10-PCS | Mod: ,,, | Performed by: INTERNAL MEDICINE

## 2020-10-08 PROCEDURE — 25500020 PHARM REV CODE 255: Performed by: EMERGENCY MEDICINE

## 2020-10-08 PROCEDURE — 83690 ASSAY OF LIPASE: CPT

## 2020-10-08 PROCEDURE — 96375 TX/PRO/DX INJ NEW DRUG ADDON: CPT

## 2020-10-08 RX ORDER — DICYCLOMINE HYDROCHLORIDE 20 MG/1
20 TABLET ORAL 2 TIMES DAILY
Qty: 20 TABLET | Refills: 0 | Status: SHIPPED | OUTPATIENT
Start: 2020-10-08 | End: 2020-11-07

## 2020-10-08 RX ORDER — LORAZEPAM 2 MG/ML
1 INJECTION INTRAMUSCULAR
Status: COMPLETED | OUTPATIENT
Start: 2020-10-08 | End: 2020-10-08

## 2020-10-08 RX ORDER — POTASSIUM CHLORIDE 750 MG/1
10 TABLET, EXTENDED RELEASE ORAL DAILY
Qty: 30 TABLET | Refills: 0 | Status: ON HOLD | OUTPATIENT
Start: 2020-10-08 | End: 2020-12-16 | Stop reason: SDUPTHER

## 2020-10-08 RX ORDER — ONDANSETRON 4 MG/1
4 TABLET, FILM COATED ORAL EVERY 6 HOURS
Qty: 12 TABLET | Refills: 0 | Status: ON HOLD | OUTPATIENT
Start: 2020-10-08 | End: 2020-12-16 | Stop reason: SDUPTHER

## 2020-10-08 RX ORDER — POTASSIUM CHLORIDE 1.5 G/1.58G
60 POWDER, FOR SOLUTION ORAL
Status: COMPLETED | OUTPATIENT
Start: 2020-10-08 | End: 2020-10-08

## 2020-10-08 RX ORDER — ONDANSETRON 2 MG/ML
4 INJECTION INTRAMUSCULAR; INTRAVENOUS
Status: COMPLETED | OUTPATIENT
Start: 2020-10-08 | End: 2020-10-08

## 2020-10-08 RX ADMIN — SODIUM CHLORIDE 1000 ML: 0.9 INJECTION, SOLUTION INTRAVENOUS at 05:10

## 2020-10-08 RX ADMIN — SODIUM CHLORIDE 1000 ML: 0.9 INJECTION, SOLUTION INTRAVENOUS at 06:10

## 2020-10-08 RX ADMIN — LORAZEPAM 1 MG: 2 INJECTION INTRAMUSCULAR; INTRAVENOUS at 05:10

## 2020-10-08 RX ADMIN — IOHEXOL 75 ML: 350 INJECTION, SOLUTION INTRAVENOUS at 06:10

## 2020-10-08 RX ADMIN — POTASSIUM CHLORIDE 60 MEQ: 1.5 POWDER, FOR SOLUTION ORAL at 06:10

## 2020-10-08 RX ADMIN — ONDANSETRON 4 MG: 2 INJECTION INTRAMUSCULAR; INTRAVENOUS at 06:10

## 2020-10-08 NOTE — ED TRIAGE NOTES
Pt arrived to ED via EMS with c/o numbness and tingling to  Hands, arms, and  lips x1  month but worsen on today. Pt reports for about 3 weeks she has been having lightheadedness states when she is lying down it feels like the room is spinning.

## 2020-10-08 NOTE — ED PROVIDER NOTES
Encounter Date: 10/8/2020       History     Chief Complaint   Patient presents with    Panic Attack     EMS reports pt c/o anxiety worsening over the last month. pt has hx of anxiety but has been off of meds x 1 year. c/o numbness and spasms to JOSEP hands and feet      45-year-old female presenting secondary to which he feels like his potentially a panic attack when she started hyperventilating and feeling nervous and impending doom and started getting bilateral hand and feet numbness along with hand cramping.  This is improved with calming down.  Patient is not have any chest pain or shortness of breath at this time.  No abdominal pain or nausea or vomiting.  Patient does have a lot of stress in her life at this particular time.  Has been off medications.  History of bipolar depression.  No other complaints.  Decreased sleep and p.o. intake.        Review of patient's allergies indicates:  No Known Allergies  Past Medical History:   Diagnosis Date    Allergy     Anemia     Anxiety     Bipolar 1 disorder     Depression     Major depression     Meningitis     Schizophrenia      Past Surgical History:   Procedure Laterality Date    TUBAL LIGATION       Family History   Problem Relation Age of Onset    Alcohol abuse Mother     Kidney disease Mother     Heart disease Father      Social History     Tobacco Use    Smoking status: Current Every Day Smoker     Packs/day: 0.50     Types: Cigarettes    Smokeless tobacco: Never Used   Substance Use Topics    Alcohol use: Yes     Comment: drinks beer daily    Drug use: Yes     Types: Marijuana     Comment: heroin     Review of Systems   Constitutional: Negative for fever.   HENT: Negative for sore throat.    Respiratory: Negative for shortness of breath.    Cardiovascular: Negative for chest pain.   Gastrointestinal: Negative for nausea.   Genitourinary: Negative for dysuria.   Musculoskeletal: Negative for back pain.   Skin: Negative for rash.   Neurological:  Negative for weakness.   Hematological: Does not bruise/bleed easily.   Psychiatric/Behavioral: Positive for agitation.   All other systems reviewed and are negative.      Physical Exam     Initial Vitals [10/08/20 1651]   BP Pulse Resp Temp SpO2   138/88 102 (!) 24 98.2 °F (36.8 °C) 97 %      MAP       --         Physical Exam    Nursing note and vitals reviewed.  Constitutional: She appears well-developed and well-nourished.   HENT:   Head: Normocephalic and atraumatic.   Eyes: EOM are normal. Pupils are equal, round, and reactive to light.   Neck: Normal range of motion.   Cardiovascular: Normal rate, regular rhythm and intact distal pulses.   Pulmonary/Chest: Breath sounds normal. No stridor. No respiratory distress. She has no wheezes.   Abdominal: Soft. Bowel sounds are normal.   Musculoskeletal: Normal range of motion. No edema.   Neurological: She is alert and oriented to person, place, and time. She has normal strength. GCS score is 15. GCS eye subscore is 4. GCS verbal subscore is 5. GCS motor subscore is 6.   Skin: Skin is warm and dry. Capillary refill takes more than 3 seconds.   Psychiatric: She has a normal mood and affect. Thought content normal.         ED Course   Procedures  Labs Reviewed   CBC W/ AUTO DIFFERENTIAL - Abnormal; Notable for the following components:       Result Value    RBC 3.51 (*)     Hematocrit 36.7 (*)     Mean Corpuscular Volume 105 (*)     Mean Corpuscular Hemoglobin 36.2 (*)     All other components within normal limits   COMPREHENSIVE METABOLIC PANEL - Abnormal; Notable for the following components:    Potassium 2.6 (*)     BUN, Bld 4 (*)     Calcium 8.2 (*)     Total Bilirubin 1.3 (*)     Alkaline Phosphatase 168 (*)      (*)     ALT 89 (*)     All other components within normal limits    Narrative:     K   critical result(s) called and verbal readback obtained from   LADARIUS LI by VALENTE 10/08/2020 18:23   LIPASE - Abnormal; Notable for the following components:     Lipase 102 (*)     All other components within normal limits   URINALYSIS, REFLEX TO URINE CULTURE - Abnormal; Notable for the following components:    Urobilinogen, UA 2.0-3.0 (*)     All other components within normal limits    Narrative:     Specimen Source->Urine   MAGNESIUM   POCT URINE PREGNANCY     EKG Readings: (Independently Interpreted)   EKG done at 5:13 p.m. showing normal sinus rhythm with a wavy baseline.  No ST elevation or major T-wave abnormalities.  Normal axis QRS.  Compared to previous and similar.  Nonspecific EKG.       Imaging Results          CT Abdomen Pelvis With Contrast (Final result)  Result time 10/08/20 19:11:10    Final result by Mireille Fuentes MD (10/08/20 19:11:10)                 Impression:      1. Hypoattenuating 3 cm splenic mass which appears new compared to previous CT from October 2018.  Future follow-up is advised.  Recommend nonemergent outpatient MRI follow-up.  2. Otherwise no acute intra-abdominal abnormalities identified.  3. Hepatomegaly with stable hypoattenuating 1.5 cm hepatic lesion.  Attempt at further characterization can also be obtained on future MR follow-up.  4. Additional findings as detailed above.      Electronically signed by: Mireille Fuentes MD  Date:    10/08/2020  Time:    19:11             Narrative:    EXAMINATION:  CT ABDOMEN PELVIS WITH CONTRAST    CLINICAL HISTORY:  Epigastric pain;    TECHNIQUE:  Low dose axial images, sagittal and coronal reformations were obtained from the lung bases to the pubic symphysis following the IV administration of 75 mL of Omnipaque 350 .  Oral contrast was not given.    COMPARISON:  CT abdomen and pelvis from October 2018.    FINDINGS:  The visualized portion of the heart is unremarkable.  Stable small 2-3 mm micronodule is seen within the right lower lobe.  Lung bases are otherwise clear.    Liver is enlarged measuring 23 cm.  Stable 1.5 cm hypodense lesion is seen near the junction of the right and left hepatic  lobes which measures higher than simple fluid density, although stable from 2018. There is no intra-or extrahepatic biliary ductal dilatation.  The gallbladder is unremarkable.  There is 3 cm hypoattenuating mass visualized within the inferior aspect of the spleen.  Stomach, pancreas, and adrenal glands show no significant abnormalities.    Kidneys enhance normally with no evidence of hydronephrosis.  Ureters are difficult to track.  Urinary bladder and uterus are unremarkable.  No significant adnexal abnormalities are seen.    Appendix is not definitely visualized, however no abnormalities or inflammatory changes are seen in the region.  The visualized loops of small and large bowel show no evidence of obstruction or inflammation.  No free air or free fluid.    Aorta tapers normally.    No acute osseous abnormality identified. Subcutaneous soft tissue show no significant abnormalities.                                 Medical Decision Making:   Initial Assessment:   45-year-old female presenting secondary with sounds like a panic attack.  Clinically looks also dehydrated.  IV fluids and IV Ativan to help with symptoms.  EKG is reassuring.  Will look for any major electrolyte abnormalities or acute kidney injury or anything that would be could be causing muscle spasms.  No obvious signs of cellulitis.    Patient started complaining that she has been vomiting multiple times and having abdominal pain.  Patient's lipase is mildly elevated and AST and ALT today is are elevated consistent with alcohol abuse.  Will get CT of her abdomen.  Also potassium is markedly low.  Oral potassium and IV Zofran.  Adding on magnesium to make sure that this is not also low.    CT does not necessarily show anything acute.  Given primary care follow-up in had in detail discussion regarding decreasing her alcohol usage and also increasing her oral intake.  Oral potassium and Zofran and Bentyl p.r.n. for symptoms. Patient feels better. I  discussed with the patient/family the diagnosis, treatment plan, indications for return to the emergency department, and for expected follow-up. The patient/family verbalized an understanding. The patient/family is asked if there are any questions or concerns. We discuss the case, until all issues are addressed to the patient/familys satisfaction. Patient/family understands and is agreeable to the plan.   Brennon Jimenez      Clinical Tests:   Lab Tests: Ordered and Reviewed  Radiological Study: Ordered and Reviewed  Medical Tests: Ordered and Reviewed                             Clinical Impression:       ICD-10-CM ICD-9-CM   1. Anxiety  F41.9 300.00   2. Hypokalemia  E87.6 276.8   3. Elevated LFTs  R79.89 790.6                          ED Disposition Condition    Discharge Stable        ED Prescriptions     Medication Sig Dispense Start Date End Date Auth. Provider    potassium chloride (KLOR-CON) 10 MEQ TbSR Take 1 tablet (10 mEq total) by mouth once daily. 30 tablet 10/8/2020  Brennon Jimenez MD    dicyclomine (BENTYL) 20 mg tablet Take 1 tablet (20 mg total) by mouth 2 (two) times daily. 20 tablet 10/8/2020 11/7/2020 Brennon Jimenez MD    ondansetron (ZOFRAN) 4 MG tablet Take 1 tablet (4 mg total) by mouth every 6 (six) hours. 12 tablet 10/8/2020  Brennon Jimenez MD        Follow-up Information     Follow up With Specialties Details Why Contact Info    [facility] Schedule an appointment as soon as possible for a visit in 2 days  [address] [facility] [place] [address]  [phone]                                         Brennno Jimenez MD  10/08/20 4791

## 2020-10-08 NOTE — Clinical Note
"Carmen Mccartney (Randi)inguez was seen and treated in our emergency department on 10/8/2020.  She may return to work on 10/10/2020.       If you have any questions or concerns, please don't hesitate to call.      Mian PITTMAN RN    "

## 2020-10-08 NOTE — DISCHARGE INSTRUCTIONS
Thank you for coming to our Emergency Department today. It is important to remember that some problems are difficult to diagnose and may not be found during your first visit. Be sure to follow up with your primary care doctor and review any labs/imaging that was performed with them. If you do not have a primary care doctor, you may contact the one listed on your discharge paperwork or you may also call the [facility] Appointment Desk at [phone] to schedule an appointment with one.     All medications may potentially have side effects and it is impossible to predict which medications may give you side effects. If you feel that you are having a negative effect of any medication you should immediately stop taking them and seek medical attention.    Return to the ER with any questions/concerns, new/concerning symptoms, worsening or failure to improve. Do not drive or make any important decisions for 24 hours if you have received any pain medications, sedatives or mood altering drugs during your ER visit.    Please refrain from any alcohol use

## 2020-11-20 PROCEDURE — 96372 THER/PROPH/DIAG INJ SC/IM: CPT | Mod: ER

## 2020-11-20 PROCEDURE — 99284 EMERGENCY DEPT VISIT MOD MDM: CPT | Mod: 25,ER

## 2020-11-21 ENCOUNTER — HOSPITAL ENCOUNTER (EMERGENCY)
Facility: HOSPITAL | Age: 45
Discharge: HOME | End: 2020-11-21
Attending: EMERGENCY MEDICINE
Payer: MEDICAID

## 2020-11-21 VITALS
OXYGEN SATURATION: 98 % | BODY MASS INDEX: 23.19 KG/M2 | HEART RATE: 92 BPM | DIASTOLIC BLOOD PRESSURE: 84 MMHG | TEMPERATURE: 99 F | WEIGHT: 126 LBS | HEIGHT: 62 IN | SYSTOLIC BLOOD PRESSURE: 124 MMHG | RESPIRATION RATE: 18 BRPM

## 2020-11-21 DIAGNOSIS — S39.012A BACK STRAIN, INITIAL ENCOUNTER: Primary | ICD-10-CM

## 2020-11-21 DIAGNOSIS — M54.9 UPPER BACK PAIN: ICD-10-CM

## 2020-11-21 PROCEDURE — 63600175 PHARM REV CODE 636 W HCPCS: Mod: ER | Performed by: EMERGENCY MEDICINE

## 2020-11-21 RX ORDER — METHOCARBAMOL 750 MG/1
1500 TABLET, FILM COATED ORAL EVERY 6 HOURS
Qty: 24 TABLET | Refills: 0 | Status: SHIPPED | OUTPATIENT
Start: 2020-11-21 | End: 2020-11-24

## 2020-11-21 RX ORDER — KETOROLAC TROMETHAMINE 10 MG/1
10 TABLET, FILM COATED ORAL EVERY 6 HOURS PRN
Qty: 12 TABLET | Refills: 0 | Status: SHIPPED | OUTPATIENT
Start: 2020-11-21 | End: 2020-11-24

## 2020-11-21 RX ORDER — KETOROLAC TROMETHAMINE 30 MG/ML
30 INJECTION, SOLUTION INTRAMUSCULAR; INTRAVENOUS
Status: COMPLETED | OUTPATIENT
Start: 2020-11-21 | End: 2020-11-21

## 2020-11-21 RX ORDER — METHYLPREDNISOLONE 4 MG/1
TABLET ORAL
Qty: 1 PACKAGE | Refills: 0 | Status: ON HOLD | OUTPATIENT
Start: 2020-11-21 | End: 2020-12-16 | Stop reason: HOSPADM

## 2020-11-21 RX ADMIN — KETOROLAC TROMETHAMINE 30 MG: 30 INJECTION, SOLUTION INTRAMUSCULAR at 01:11

## 2020-11-21 NOTE — ED PROVIDER NOTES
Encounter Date: 11/20/2020       History     Chief Complaint   Patient presents with    Back Pain     c/o upper back pain x 2 days. denies trauma or injury. pt reports she took 1/2 norco last night but denies relief. denies urinary problems     45 y.o. female with tobacco abuse and other medical problems presents emergency department complaining of acute, nontraumatic, upper back pain that began two days ago.  She denies known inciting event but reports working as a  and is concerned that she may have strained her upper back.  She reports pain is worse with certain movements and with lying supine.  She reports intermittent tingling sensation to the bilateral arms.  Denies weakness, gait disturbance, bowel/bladder incontinence urinary retention.        Review of patient's allergies indicates:  No Known Allergies  Past Medical History:   Diagnosis Date    Allergy     Anemia     Anxiety     Bipolar 1 disorder     Depression     Major depression     Meningitis     Schizophrenia      Past Surgical History:   Procedure Laterality Date    TUBAL LIGATION       Family History   Problem Relation Age of Onset    Alcohol abuse Mother     Kidney disease Mother     Heart disease Father      Social History     Tobacco Use    Smoking status: Current Every Day Smoker     Packs/day: 0.50     Types: Cigarettes    Smokeless tobacco: Never Used   Substance Use Topics    Alcohol use: Yes     Comment: drinks beer daily    Drug use: Yes     Types: Marijuana     Comment: heroin     Review of Systems   Constitutional: Negative for fever.   Respiratory: Negative for cough, chest tightness, shortness of breath and wheezing.    Cardiovascular: Negative for chest pain, palpitations and leg swelling.   Gastrointestinal: Negative for nausea and vomiting.   Genitourinary: Negative for enuresis.   Musculoskeletal: Positive for back pain and myalgias. Negative for gait problem.   Skin: Negative for rash.   Neurological:  Negative for weakness.   All other systems reviewed and are negative.      Physical Exam     Initial Vitals [11/20/20 2324]   BP Pulse Resp Temp SpO2   118/82 97 18 98.5 °F (36.9 °C) 98 %      MAP       --         Physical Exam    Nursing note and vitals reviewed.  Constitutional: She appears well-developed and well-nourished. She is not diaphoretic. No distress.   HENT:   Head: Normocephalic and atraumatic.   Eyes: Conjunctivae are normal. Pupils are equal, round, and reactive to light.   Neck: Normal range of motion and phonation normal. Neck supple. No stridor present. No spinous process tenderness and no muscular tenderness present.   Cardiovascular: Normal rate and intact distal pulses.   Pulmonary/Chest: No accessory muscle usage. No tachypnea. No respiratory distress.   Abdominal: She exhibits no distension. There is no abdominal tenderness.   Musculoskeletal: Normal range of motion.      Right shoulder: Normal.      Left shoulder: Normal.      Thoracic back: She exhibits tenderness, bony tenderness and spasm. She exhibits no swelling and no deformity.      Lumbar back: Normal.        Back:    Neurological: She is alert and oriented to person, place, and time. She has normal strength. Gait normal. GCS eye subscore is 4. GCS verbal subscore is 5. GCS motor subscore is 6.   Skin: Skin is warm. Capillary refill takes less than 2 seconds.   Psychiatric: She has a normal mood and affect.         ED Course   Procedures  Labs Reviewed - No data to display       Imaging Results          X-Ray Thoracic Spine AP Lateral (Final result)  Result time 11/21/20 02:42:12    Final result by Jam Hernandez MD (11/21/20 02:42:12)                 Impression:      The examination is limited as discussed above however there is no radiographic evidence for acute process on submitted imaging, chronic changes are noted      Electronically signed by: Jam Hernandez  Date:    11/21/2020  Time:    02:42             Narrative:     EXAMINATION:  XR THORACIC SPINE AP LATERAL    CLINICAL HISTORY:  Dorsalgia, unspecified    TECHNIQUE:  AP and lateral views of the thoracic spine were performed.    COMPARISON:  None    FINDINGS:  Radiographic examination of the thoracic spine was performed, AP and lateral views.  On the lateral views the upper thoracic spine is not optimally visualized.  On the AP view there is minimal curvature of the thoracic spine.  There is no evidence for high-grade spondylolisthesis, there is no evidence for high-grade or acute compression fracture deformity.  Mild chronic appearing endplate changes noted, there is no additional radiographic evidence for acute process.                                        Labs Reviewed       Imaging Reviewed    Imaging Results          X-Ray Thoracic Spine AP Lateral (Final result)  Result time 11/21/20 02:42:12    Final result by Jam Hernandez MD (11/21/20 02:42:12)                 Impression:      The examination is limited as discussed above however there is no radiographic evidence for acute process on submitted imaging, chronic changes are noted      Electronically signed by: Jam Hernandez  Date:    11/21/2020  Time:    02:42             Narrative:    EXAMINATION:  XR THORACIC SPINE AP LATERAL    CLINICAL HISTORY:  Dorsalgia, unspecified    TECHNIQUE:  AP and lateral views of the thoracic spine were performed.    COMPARISON:  None    FINDINGS:  Radiographic examination of the thoracic spine was performed, AP and lateral views.  On the lateral views the upper thoracic spine is not optimally visualized.  On the AP view there is minimal curvature of the thoracic spine.  There is no evidence for high-grade spondylolisthesis, there is no evidence for high-grade or acute compression fracture deformity.  Mild chronic appearing endplate changes noted, there is no additional radiographic evidence for acute process.                                Medications given in ED    Medications    ketorolac injection 30 mg (30 mg Intramuscular Given 11/21/20 0149)       Note was created using voice recognition software. Note may have occasional typographical errors that may not have been identified and edited despite good quita initial review prior to signing.                          Clinical Impression:       ICD-10-CM ICD-9-CM   1. Back strain, initial encounter  S39.012A 847.9   2. Upper back pain  M54.9 724.5                          ED Disposition Condition    Discharge Stable        ED Prescriptions     Medication Sig Dispense Start Date End Date Auth. Provider    ketorolac (TORADOL) 10 mg tablet (Expires today) Take 1 tablet (10 mg total) by mouth every 6 (six) hours as needed for Pain (take with food). 12 tablet 11/21/2020 11/24/2020 Alberta rAndt MD    methocarbamoL (ROBAXIN) 750 MG Tab (Expires today) Take 2 tablets (1,500 mg total) by mouth every 6 (six) hours. for 3 days 24 tablet 11/21/2020 11/24/2020 Alberta Arndt MD    methylPREDNISolone (MEDROL DOSEPACK) 4 mg tablet Take as directed 1 Package 11/21/2020 12/12/2020 Alberta Arndt MD        Follow-up Information     Follow up With Specialties Details Why Contact Info    [facility] Call  Monday morning, to schedule an appointment, for re-evaluation of today's complaint, and ongoing care [address] [facility] [address]  [phone]                                         Alberta Arndt MD  11/24/20 [id number]

## 2020-12-11 ENCOUNTER — HOSPITAL ENCOUNTER (INPATIENT)
Facility: HOSPITAL | Age: 45
LOS: 5 days | Discharge: HOME | DRG: 897 | End: 2020-12-16
Attending: EMERGENCY MEDICINE | Admitting: EMERGENCY MEDICINE
Payer: MEDICAID

## 2020-12-11 DIAGNOSIS — E87.8 REFEEDING SYNDROME: ICD-10-CM

## 2020-12-11 DIAGNOSIS — F10.939 ALCOHOL WITHDRAWAL SYNDROME WITH COMPLICATION: ICD-10-CM

## 2020-12-11 DIAGNOSIS — R07.9 CHEST PAIN: ICD-10-CM

## 2020-12-11 DIAGNOSIS — R74.01 TRANSAMINITIS: ICD-10-CM

## 2020-12-11 DIAGNOSIS — E87.6 HYPOKALEMIA: Primary | ICD-10-CM

## 2020-12-11 DIAGNOSIS — F10.10 ALCOHOL ABUSE: ICD-10-CM

## 2020-12-11 DIAGNOSIS — E86.0 DEHYDRATION: ICD-10-CM

## 2020-12-11 PROBLEM — F10.90 ALCOHOL USE: Status: ACTIVE | Noted: 2020-12-11

## 2020-12-11 PROBLEM — Z78.9 ALCOHOL USE: Status: ACTIVE | Noted: 2020-12-11

## 2020-12-11 PROBLEM — F10.980 ALCOHOL-INDUCED ANXIETY DISORDER: Status: ACTIVE | Noted: 2020-12-11

## 2020-12-11 PROBLEM — F32.A DEPRESSION: Status: ACTIVE | Noted: 2020-12-11

## 2020-12-11 PROBLEM — F10.20 ALCOHOL USE DISORDER, SEVERE, DEPENDENCE: Status: ACTIVE | Noted: 2020-12-11

## 2020-12-11 PROBLEM — F99 PSYCHIATRIC ILLNESS: Status: ACTIVE | Noted: 2020-12-11

## 2020-12-11 LAB
ALBUMIN SERPL BCP-MCNC: 3 G/DL (ref 3.5–5.2)
ALBUMIN SERPL BCP-MCNC: 3.1 G/DL (ref 3.5–5.2)
ALBUMIN SERPL BCP-MCNC: 3.6 G/DL (ref 3.5–5.2)
ALP SERPL-CCNC: 112 U/L (ref 55–135)
ALP SERPL-CCNC: 145 U/L (ref 55–135)
ALT SERPL W/O P-5'-P-CCNC: 67 U/L (ref 10–44)
ALT SERPL W/O P-5'-P-CCNC: 75 U/L (ref 10–44)
AMPHET+METHAMPHET UR QL: NEGATIVE
ANION GAP SERPL CALC-SCNC: 10 MMOL/L (ref 8–16)
ANION GAP SERPL CALC-SCNC: 13 MMOL/L (ref 8–16)
ANION GAP SERPL CALC-SCNC: 8 MMOL/L (ref 8–16)
APAP SERPL-MCNC: <3 UG/ML (ref 10–20)
AST SERPL-CCNC: 281 U/L (ref 10–40)
AST SERPL-CCNC: 326 U/L (ref 10–40)
BARBITURATES UR QL SCN>200 NG/ML: NEGATIVE
BASOPHILS # BLD AUTO: 0.04 K/UL (ref 0–0.2)
BASOPHILS # BLD AUTO: 0.08 K/UL (ref 0–0.2)
BASOPHILS NFR BLD: 0.8 % (ref 0–1.9)
BASOPHILS NFR BLD: 0.9 % (ref 0–1.9)
BENZODIAZ UR QL SCN>200 NG/ML: NEGATIVE
BILIRUB SERPL-MCNC: 0.8 MG/DL (ref 0.1–1)
BILIRUB SERPL-MCNC: 0.9 MG/DL (ref 0.1–1)
BILIRUB UR QL STRIP: NEGATIVE
BUN SERPL-MCNC: 2 MG/DL (ref 6–20)
BZE UR QL SCN: NEGATIVE
CALCIUM SERPL-MCNC: 7.1 MG/DL (ref 8.7–10.5)
CALCIUM SERPL-MCNC: 7.3 MG/DL (ref 8.7–10.5)
CALCIUM SERPL-MCNC: 8.6 MG/DL (ref 8.7–10.5)
CANNABINOIDS UR QL SCN: ABNORMAL
CHLORIDE SERPL-SCNC: 104 MMOL/L (ref 95–110)
CHLORIDE SERPL-SCNC: 107 MMOL/L (ref 95–110)
CHLORIDE SERPL-SCNC: 98 MMOL/L (ref 95–110)
CLARITY UR: CLEAR
CO2 SERPL-SCNC: 27 MMOL/L (ref 23–29)
CO2 SERPL-SCNC: 29 MMOL/L (ref 23–29)
CO2 SERPL-SCNC: 31 MMOL/L (ref 23–29)
COLOR UR: ABNORMAL
CREAT SERPL-MCNC: 0.5 MG/DL (ref 0.5–1.4)
CREAT SERPL-MCNC: 0.5 MG/DL (ref 0.5–1.4)
CREAT SERPL-MCNC: 0.6 MG/DL (ref 0.5–1.4)
CREAT UR-MCNC: 7.9 MG/DL (ref 15–325)
CTP QC/QA: YES
DIFFERENTIAL METHOD: ABNORMAL
DIFFERENTIAL METHOD: ABNORMAL
EOSINOPHIL # BLD AUTO: 0.1 K/UL (ref 0–0.5)
EOSINOPHIL # BLD AUTO: 0.1 K/UL (ref 0–0.5)
EOSINOPHIL NFR BLD: 1.3 % (ref 0–8)
EOSINOPHIL NFR BLD: 1.6 % (ref 0–8)
ERYTHROCYTE [DISTWIDTH] IN BLOOD BY AUTOMATED COUNT: 12.8 % (ref 11.5–14.5)
ERYTHROCYTE [DISTWIDTH] IN BLOOD BY AUTOMATED COUNT: 13.2 % (ref 11.5–14.5)
EST. GFR  (AFRICAN AMERICAN): >60 ML/MIN/1.73 M^2
EST. GFR  (NON AFRICAN AMERICAN): >60 ML/MIN/1.73 M^2
ETHANOL SERPL-MCNC: >450 MG/DL
GLUCOSE SERPL-MCNC: 106 MG/DL (ref 70–110)
GLUCOSE SERPL-MCNC: 86 MG/DL (ref 70–110)
GLUCOSE SERPL-MCNC: 90 MG/DL (ref 70–110)
GLUCOSE UR QL STRIP: NEGATIVE
HCT VFR BLD AUTO: 33.1 % (ref 37–48.5)
HCT VFR BLD AUTO: 37.7 % (ref 37–48.5)
HGB BLD-MCNC: 11.6 G/DL (ref 12–16)
HGB BLD-MCNC: 13.1 G/DL (ref 12–16)
HGB UR QL STRIP: NEGATIVE
IMM GRANULOCYTES # BLD AUTO: 0.01 K/UL (ref 0–0.04)
IMM GRANULOCYTES # BLD AUTO: 0.03 K/UL (ref 0–0.04)
IMM GRANULOCYTES NFR BLD AUTO: 0.2 % (ref 0–0.5)
IMM GRANULOCYTES NFR BLD AUTO: 0.3 % (ref 0–0.5)
KETONES UR QL STRIP: NEGATIVE
LEUKOCYTE ESTERASE UR QL STRIP: NEGATIVE
LYMPHOCYTES # BLD AUTO: 2.1 K/UL (ref 1–4.8)
LYMPHOCYTES # BLD AUTO: 3.7 K/UL (ref 1–4.8)
LYMPHOCYTES NFR BLD: 42.3 % (ref 18–48)
LYMPHOCYTES NFR BLD: 42.8 % (ref 18–48)
MAGNESIUM SERPL-MCNC: 1.5 MG/DL (ref 1.6–2.6)
MAGNESIUM SERPL-MCNC: 1.6 MG/DL (ref 1.6–2.6)
MCH RBC QN AUTO: 34.7 PG (ref 27–31)
MCH RBC QN AUTO: 34.7 PG (ref 27–31)
MCHC RBC AUTO-ENTMCNC: 34.7 G/DL (ref 32–36)
MCHC RBC AUTO-ENTMCNC: 35 G/DL (ref 32–36)
MCV RBC AUTO: 100 FL (ref 82–98)
MCV RBC AUTO: 99 FL (ref 82–98)
METHADONE UR QL SCN>300 NG/ML: NEGATIVE
MONOCYTES # BLD AUTO: 0.4 K/UL (ref 0.3–1)
MONOCYTES # BLD AUTO: 0.8 K/UL (ref 0.3–1)
MONOCYTES NFR BLD: 8.9 % (ref 4–15)
MONOCYTES NFR BLD: 9.1 % (ref 4–15)
NEUTROPHILS # BLD AUTO: 2.3 K/UL (ref 1.8–7.7)
NEUTROPHILS # BLD AUTO: 3.9 K/UL (ref 1.8–7.7)
NEUTROPHILS NFR BLD: 45.6 % (ref 38–73)
NEUTROPHILS NFR BLD: 46.2 % (ref 38–73)
NITRITE UR QL STRIP: NEGATIVE
NRBC BLD-RTO: 0 /100 WBC
NRBC BLD-RTO: 0 /100 WBC
OPIATES UR QL SCN: NEGATIVE
PCP UR QL SCN>25 NG/ML: NEGATIVE
PH UR STRIP: 8 [PH] (ref 5–8)
PHOSPHATE SERPL-MCNC: 2.1 MG/DL (ref 2.7–4.5)
PHOSPHATE SERPL-MCNC: 2.2 MG/DL (ref 2.7–4.5)
PLATELET # BLD AUTO: 123 K/UL (ref 150–350)
PLATELET # BLD AUTO: 140 K/UL (ref 150–350)
PMV BLD AUTO: 10 FL (ref 9.2–12.9)
PMV BLD AUTO: 10 FL (ref 9.2–12.9)
POTASSIUM SERPL-SCNC: 2.2 MMOL/L (ref 3.5–5.1)
POTASSIUM SERPL-SCNC: 2.6 MMOL/L (ref 3.5–5.1)
POTASSIUM SERPL-SCNC: 3.3 MMOL/L (ref 3.5–5.1)
PROT SERPL-MCNC: 6.6 G/DL (ref 6–8.4)
PROT SERPL-MCNC: 7.9 G/DL (ref 6–8.4)
PROT UR QL STRIP: NEGATIVE
RBC # BLD AUTO: 3.34 M/UL (ref 4–5.4)
RBC # BLD AUTO: 3.78 M/UL (ref 4–5.4)
SALICYLATES SERPL-MCNC: <5 MG/DL (ref 15–30)
SARS-COV-2 RDRP RESP QL NAA+PROBE: NEGATIVE
SODIUM SERPL-SCNC: 141 MMOL/L (ref 136–145)
SODIUM SERPL-SCNC: 142 MMOL/L (ref 136–145)
SODIUM SERPL-SCNC: 144 MMOL/L (ref 136–145)
SP GR UR STRIP: 1 (ref 1–1.03)
TOXICOLOGY INFORMATION: ABNORMAL
TSH SERPL DL<=0.005 MIU/L-ACNC: 2.14 UIU/ML (ref 0.4–4)
URN SPEC COLLECT METH UR: ABNORMAL
UROBILINOGEN UR STRIP-ACNC: NEGATIVE EU/DL
WBC # BLD AUTO: 4.94 K/UL (ref 3.9–12.7)
WBC # BLD AUTO: 8.58 K/UL (ref 3.9–12.7)

## 2020-12-11 PROCEDURE — 93010 ELECTROCARDIOGRAM REPORT: CPT | Mod: ,,, | Performed by: INTERNAL MEDICINE

## 2020-12-11 PROCEDURE — 85025 COMPLETE CBC W/AUTO DIFF WBC: CPT

## 2020-12-11 PROCEDURE — 80320 DRUG SCREEN QUANTALCOHOLS: CPT

## 2020-12-11 PROCEDURE — 63600175 PHARM REV CODE 636 W HCPCS: Performed by: INTERNAL MEDICINE

## 2020-12-11 PROCEDURE — 80053 COMPREHEN METABOLIC PANEL: CPT | Mod: 91

## 2020-12-11 PROCEDURE — 25000003 PHARM REV CODE 250: Performed by: EMERGENCY MEDICINE

## 2020-12-11 PROCEDURE — 80307 DRUG TEST PRSMV CHEM ANLYZR: CPT

## 2020-12-11 PROCEDURE — 80329 ANALGESICS NON-OPIOID 1 OR 2: CPT

## 2020-12-11 PROCEDURE — 36415 COLL VENOUS BLD VENIPUNCTURE: CPT

## 2020-12-11 PROCEDURE — 96361 HYDRATE IV INFUSION ADD-ON: CPT

## 2020-12-11 PROCEDURE — 25000003 PHARM REV CODE 250: Performed by: INTERNAL MEDICINE

## 2020-12-11 PROCEDURE — 96374 THER/PROPH/DIAG INJ IV PUSH: CPT

## 2020-12-11 PROCEDURE — 90792 PR PSYCHIATRIC DIAGNOSTIC EVALUATION W/MEDICAL SERVICES: ICD-10-PCS | Mod: AF,HB,, | Performed by: PSYCHIATRY & NEUROLOGY

## 2020-12-11 PROCEDURE — 80053 COMPREHEN METABOLIC PANEL: CPT

## 2020-12-11 PROCEDURE — 83735 ASSAY OF MAGNESIUM: CPT | Mod: 91

## 2020-12-11 PROCEDURE — 99900035 HC TECH TIME PER 15 MIN (STAT)

## 2020-12-11 PROCEDURE — 25000003 PHARM REV CODE 250: Performed by: NURSE PRACTITIONER

## 2020-12-11 PROCEDURE — 84443 ASSAY THYROID STIM HORMONE: CPT

## 2020-12-11 PROCEDURE — 84100 ASSAY OF PHOSPHORUS: CPT

## 2020-12-11 PROCEDURE — 93005 ELECTROCARDIOGRAM TRACING: CPT

## 2020-12-11 PROCEDURE — 83735 ASSAY OF MAGNESIUM: CPT

## 2020-12-11 PROCEDURE — 99291 CRITICAL CARE FIRST HOUR: CPT | Mod: 25

## 2020-12-11 PROCEDURE — 21400001 HC TELEMETRY ROOM

## 2020-12-11 PROCEDURE — 96375 TX/PRO/DX INJ NEW DRUG ADDON: CPT

## 2020-12-11 PROCEDURE — 81003 URINALYSIS AUTO W/O SCOPE: CPT | Mod: 59

## 2020-12-11 PROCEDURE — 93010 EKG 12-LEAD: ICD-10-PCS | Mod: ,,, | Performed by: INTERNAL MEDICINE

## 2020-12-11 PROCEDURE — 63600175 PHARM REV CODE 636 W HCPCS: Performed by: EMERGENCY MEDICINE

## 2020-12-11 PROCEDURE — U0002 COVID-19 LAB TEST NON-CDC: HCPCS | Performed by: EMERGENCY MEDICINE

## 2020-12-11 PROCEDURE — 94761 N-INVAS EAR/PLS OXIMETRY MLT: CPT

## 2020-12-11 PROCEDURE — 25000003 PHARM REV CODE 250: Performed by: PHYSICIAN ASSISTANT

## 2020-12-11 PROCEDURE — 80069 RENAL FUNCTION PANEL: CPT

## 2020-12-11 PROCEDURE — 90792 PSYCH DIAG EVAL W/MED SRVCS: CPT | Mod: AF,HB,, | Performed by: PSYCHIATRY & NEUROLOGY

## 2020-12-11 RX ORDER — ONDANSETRON 2 MG/ML
4 INJECTION INTRAMUSCULAR; INTRAVENOUS EVERY 8 HOURS PRN
Status: DISCONTINUED | OUTPATIENT
Start: 2020-12-11 | End: 2020-12-16 | Stop reason: HOSPADM

## 2020-12-11 RX ORDER — GLUCAGON 1 MG
1 KIT INJECTION
Status: DISCONTINUED | OUTPATIENT
Start: 2020-12-11 | End: 2020-12-16 | Stop reason: HOSPADM

## 2020-12-11 RX ORDER — SODIUM CHLORIDE 0.9 % (FLUSH) 0.9 %
10 SYRINGE (ML) INJECTION
Status: DISCONTINUED | OUTPATIENT
Start: 2020-12-11 | End: 2020-12-11

## 2020-12-11 RX ORDER — SODIUM CHLORIDE AND POTASSIUM CHLORIDE 150; 900 MG/100ML; MG/100ML
INJECTION, SOLUTION INTRAVENOUS
Status: COMPLETED | OUTPATIENT
Start: 2020-12-11 | End: 2020-12-11

## 2020-12-11 RX ORDER — DIAZEPAM 5 MG/1
5 TABLET ORAL EVERY 8 HOURS
Status: DISCONTINUED | OUTPATIENT
Start: 2020-12-11 | End: 2020-12-12

## 2020-12-11 RX ORDER — LORAZEPAM 2 MG/ML
2 INJECTION INTRAMUSCULAR
Status: COMPLETED | OUTPATIENT
Start: 2020-12-11 | End: 2020-12-11

## 2020-12-11 RX ORDER — DEXTROSE MONOHYDRATE, SODIUM CHLORIDE, AND POTASSIUM CHLORIDE 50; 1.49; 9 G/1000ML; G/1000ML; G/1000ML
INJECTION, SOLUTION INTRAVENOUS CONTINUOUS
Status: DISCONTINUED | OUTPATIENT
Start: 2020-12-11 | End: 2020-12-15

## 2020-12-11 RX ORDER — ACETAMINOPHEN 500 MG
500 TABLET ORAL EVERY 6 HOURS PRN
Status: DISCONTINUED | OUTPATIENT
Start: 2020-12-11 | End: 2020-12-16 | Stop reason: HOSPADM

## 2020-12-11 RX ORDER — AMOXICILLIN 250 MG
1 CAPSULE ORAL 2 TIMES DAILY PRN
Status: DISCONTINUED | OUTPATIENT
Start: 2020-12-11 | End: 2020-12-16 | Stop reason: HOSPADM

## 2020-12-11 RX ORDER — FOLIC ACID 1 MG/1
1 TABLET ORAL DAILY
Status: DISCONTINUED | OUTPATIENT
Start: 2020-12-11 | End: 2020-12-16 | Stop reason: HOSPADM

## 2020-12-11 RX ORDER — CITALOPRAM 20 MG/1
20 TABLET, FILM COATED ORAL DAILY
Status: DISCONTINUED | OUTPATIENT
Start: 2020-12-11 | End: 2020-12-16 | Stop reason: HOSPADM

## 2020-12-11 RX ORDER — LORAZEPAM 2 MG/ML
2 INJECTION INTRAMUSCULAR EVERY 4 HOURS PRN
Status: DISCONTINUED | OUTPATIENT
Start: 2020-12-11 | End: 2020-12-16 | Stop reason: HOSPADM

## 2020-12-11 RX ORDER — IPRATROPIUM BROMIDE AND ALBUTEROL SULFATE 2.5; .5 MG/3ML; MG/3ML
3 SOLUTION RESPIRATORY (INHALATION) EVERY 4 HOURS PRN
Status: DISCONTINUED | OUTPATIENT
Start: 2020-12-11 | End: 2020-12-16 | Stop reason: HOSPADM

## 2020-12-11 RX ORDER — MAGNESIUM SULFATE 1 G/100ML
1 INJECTION INTRAVENOUS
Status: COMPLETED | OUTPATIENT
Start: 2020-12-11 | End: 2020-12-11

## 2020-12-11 RX ORDER — POTASSIUM CHLORIDE 20 MEQ/1
40 TABLET, EXTENDED RELEASE ORAL ONCE
Status: COMPLETED | OUTPATIENT
Start: 2020-12-11 | End: 2020-12-11

## 2020-12-11 RX ORDER — POTASSIUM CHLORIDE 750 MG/1
10 TABLET, EXTENDED RELEASE ORAL DAILY
Status: DISCONTINUED | OUTPATIENT
Start: 2020-12-11 | End: 2020-12-11

## 2020-12-11 RX ORDER — LANOLIN ALCOHOL/MO/W.PET/CERES
100 CREAM (GRAM) TOPICAL DAILY
Status: DISCONTINUED | OUTPATIENT
Start: 2020-12-11 | End: 2020-12-16 | Stop reason: HOSPADM

## 2020-12-11 RX ORDER — IBUPROFEN 200 MG
24 TABLET ORAL
Status: DISCONTINUED | OUTPATIENT
Start: 2020-12-11 | End: 2020-12-16 | Stop reason: HOSPADM

## 2020-12-11 RX ORDER — SODIUM CHLORIDE 0.9 % (FLUSH) 0.9 %
10 SYRINGE (ML) INJECTION
Status: DISCONTINUED | OUTPATIENT
Start: 2020-12-11 | End: 2020-12-16 | Stop reason: HOSPADM

## 2020-12-11 RX ORDER — DIAZEPAM 5 MG/1
5 TABLET ORAL EVERY 6 HOURS PRN
Status: DISCONTINUED | OUTPATIENT
Start: 2020-12-11 | End: 2020-12-11

## 2020-12-11 RX ORDER — TALC
6 POWDER (GRAM) TOPICAL NIGHTLY PRN
Status: DISCONTINUED | OUTPATIENT
Start: 2020-12-11 | End: 2020-12-16 | Stop reason: HOSPADM

## 2020-12-11 RX ORDER — IBUPROFEN 200 MG
16 TABLET ORAL
Status: DISCONTINUED | OUTPATIENT
Start: 2020-12-11 | End: 2020-12-16 | Stop reason: HOSPADM

## 2020-12-11 RX ORDER — ONDANSETRON 4 MG/1
4 TABLET, FILM COATED ORAL EVERY 6 HOURS
Status: DISCONTINUED | OUTPATIENT
Start: 2020-12-11 | End: 2020-12-16 | Stop reason: HOSPADM

## 2020-12-11 RX ADMIN — CITALOPRAM HYDROBROMIDE 20 MG: 20 TABLET ORAL at 09:12

## 2020-12-11 RX ADMIN — DEXTROSE MONOHYDRATE, SODIUM CHLORIDE, AND POTASSIUM CHLORIDE 100 ML/HR: 50; 9; 1.49 INJECTION, SOLUTION INTRAVENOUS at 08:12

## 2020-12-11 RX ADMIN — Medication 6 MG: at 09:12

## 2020-12-11 RX ADMIN — THIAMINE HCL TAB 100 MG 100 MG: 100 TAB at 09:12

## 2020-12-11 RX ADMIN — DEXTROSE MONOHYDRATE, SODIUM CHLORIDE, AND POTASSIUM CHLORIDE 100 ML/HR: 50; 9; 1.49 INJECTION, SOLUTION INTRAVENOUS at 09:12

## 2020-12-11 RX ADMIN — POTASSIUM PHOSPHATE, MONOBASIC AND POTASSIUM PHOSPHATE, DIBASIC 20 MMOL: 224; 236 INJECTION, SOLUTION, CONCENTRATE INTRAVENOUS at 05:12

## 2020-12-11 RX ADMIN — THIAMINE HYDROCHLORIDE 100 MG: 100 INJECTION, SOLUTION INTRAMUSCULAR; INTRAVENOUS at 03:12

## 2020-12-11 RX ADMIN — LORAZEPAM 2 MG: 2 INJECTION, SOLUTION INTRAMUSCULAR; INTRAVENOUS at 05:12

## 2020-12-11 RX ADMIN — FOLIC ACID 1 MG: 1 TABLET ORAL at 09:12

## 2020-12-11 RX ADMIN — THERA TABS 1 TABLET: TAB at 09:12

## 2020-12-11 RX ADMIN — THIAMINE HYDROCHLORIDE: 100 INJECTION, SOLUTION INTRAMUSCULAR; INTRAVENOUS at 06:12

## 2020-12-11 RX ADMIN — ONDANSETRON HYDROCHLORIDE 4 MG: 4 TABLET, FILM COATED ORAL at 11:12

## 2020-12-11 RX ADMIN — DIAZEPAM 5 MG: 5 TABLET ORAL at 09:12

## 2020-12-11 RX ADMIN — MAGNESIUM SULFATE 1 G: 1 INJECTION INTRAVENOUS at 06:12

## 2020-12-11 RX ADMIN — POTASSIUM CHLORIDE 40 MEQ: 1500 TABLET, EXTENDED RELEASE ORAL at 09:12

## 2020-12-11 RX ADMIN — POTASSIUM CHLORIDE 40 MEQ: 1500 TABLET, EXTENDED RELEASE ORAL at 10:12

## 2020-12-11 RX ADMIN — FOLIC ACID 1 MG: 5 INJECTION, SOLUTION INTRAMUSCULAR; INTRAVENOUS; SUBCUTANEOUS at 03:12

## 2020-12-11 RX ADMIN — SODIUM CHLORIDE AND POTASSIUM CHLORIDE 100 ML/HR: 9; 1.49 INJECTION, SOLUTION INTRAVENOUS at 05:12

## 2020-12-11 RX ADMIN — SODIUM CHLORIDE, SODIUM LACTATE, POTASSIUM CHLORIDE, AND CALCIUM CHLORIDE 1000 ML: .6; .31; .03; .02 INJECTION, SOLUTION INTRAVENOUS at 10:12

## 2020-12-11 RX ADMIN — POTASSIUM BICARBONATE 50 MEQ: 977.5 TABLET, EFFERVESCENT ORAL at 04:12

## 2020-12-11 RX ADMIN — SODIUM CHLORIDE 1000 ML: 0.9 INJECTION, SOLUTION INTRAVENOUS at 03:12

## 2020-12-11 RX ADMIN — SODIUM CHLORIDE 1000 ML: 0.9 INJECTION, SOLUTION INTRAVENOUS at 01:12

## 2020-12-11 RX ADMIN — ONDANSETRON HYDROCHLORIDE 4 MG: 4 TABLET, FILM COATED ORAL at 05:12

## 2020-12-11 NOTE — ED NOTES
Smelled cigarette smoke in hallway, upon entrance to room, pt noticed placing something in her purse. On top was a packet of cigarettes and a lighter. One partially burnt cigarette. Pt admitted to trying to take one puff right quick. Pt aware smoking is prohibited in hospital for safety reasons.

## 2020-12-11 NOTE — NURSING
Patient remains resting. Too drowsy to take by mouth medications at this time, will give when more alert. Mentally altered, talking to IV pump when in room. Bed low and locked, call light in reach. Will continue to monitor.    9:21a Patient more awake now asking for something to drink, swallows w/o diff, able to take meds w/o diff. Denies any nausea. Will continue to monitor

## 2020-12-11 NOTE — HPI
Carmen Landon is 44 y/o female with anxiety, depression, bipolar, and schizophrenia who presents to the hospital with complaining of anxiousness, bilateral lip numbness, and dysphoric mood. Per ED note, patient was tearful and stated she was sad but would not give any further details with EMS. Upon ED arrival, patient stated that she was here due to right ear pain. She endorsed large amount of alcohol usage last night. Denied any other symptoms including any hallucinations or intent to harm herself or others. During my exam, patient is very lethargic after given Ativan in ED. She is aroused briefly with tactile stimuli but falls right back to sleep. History is limited due to patient's mental status. Of note, patient was seen here on 10/8 of this year with labs indicative of alcohol abuse. Of note, the patient has been seen in the ED twice, on 6/29/17 and 7/2/14, also for alcohol intoxication. Recently seen here 3 weeks ago on 11/24/2020 for back pain.    In ED, COVID-19 negative. Labs remarkable with K+ 2.2, elevated AST//75. Drug screen with THC positive. ETOH > 450.

## 2020-12-11 NOTE — ASSESSMENT & PLAN NOTE
No evidence of primary major depression, bipolar, schizophrenia, anxiety disorder.  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  Minimizes symptoms and doesn't need psychiatric medications at this time.  Symptoms should improve with sobriety so focus should be on inpatient rehab.  No need for acute inpatient psychiatric hospitalization at this time.

## 2020-12-11 NOTE — HPI
"Patient Carmen Landon presents to the hospital with anxiousness, bilateral lip numbness, and dysphoric mood.  Found in ED, labs remarkable with K+ 2.2, elevated AST//75. Drug screen with THC positive. ETOH > 450.  Psychiatric consult placed for evaluation of symptoms at presentation.  Chart notes that patient has other presentations for alcohol intoxication.  She is easily approached in conversation and says that she is here because she feels like she has something in her ear messing with hearing and balance.  She feels like it is something physical in the canal.  She minimizes any symptoms of depression and is not currently depressed.  No loss of interest.  Says that she lacks energy and motivation to do things that she would like to do.  Says that her sleep is good.  No suicidal ideations current or past.  Admits to being a worrisome person who worries about things that she should not worry about.  Worries exacerbate tremors which is a daily thing.  Tremors are from morning until she passes out intoxicated each night.  Denies any manic or psychotic history outside of withdrawal which got her into a psychiatric hospital years ago.  She works as a / and drinks 1 pint of whiskey in addition to "few" beers daily.  Has been doing this for years.  Past rehab at [facility].  Says that she was at rehab "for a while" and remained sober while there but started drinking upon discharge.  No maintained sobriety.  She did not return to cocaine or heroin after discharge from rehab but does smoke marijuana daily.    "

## 2020-12-11 NOTE — ED PROVIDER NOTES
Encounter Date: 12/11/2020    SCRIBE #1 NOTE: I, Jorge Luis Cisneros, am scribing for, and in the presence of, Brennon Jimenez MD.       History     Chief Complaint   Patient presents with    Depression     pt with ETOH brought in by EMS who states pt had an anxiety attack and reported lip numbness, on arrival pt is tearful and states she is sad. Unable to obtain any other information from pt     Carmen Landon is a 45 year old female who presents to the ED via EMS with an onset of anxiousness, bilateral lip numbness, and dysphoric mood. While EMS reported these symptoms, patient arrived on the wall crying and would not give further details to triage. Patient states that she is here due to right ear pain and redness lasting eight months. No recent falls or trauma. She does endorse large amount of alcohol usage tonight. Denies any other symptoms at this time, including any hallucinations or intent to harm herself or others.    The history is provided by the patient.     Review of patient's allergies indicates:  No Known Allergies  Past Medical History:   Diagnosis Date    Allergy     Anemia     Anxiety     Bipolar 1 disorder     Depression     Major depression     Meningitis     Schizophrenia      Past Surgical History:   Procedure Laterality Date    TUBAL LIGATION       Family History   Problem Relation Age of Onset    Alcohol abuse Mother     Kidney disease Mother     Heart disease Father      Social History     Tobacco Use    Smoking status: Current Every Day Smoker     Packs/day: 0.50     Types: Cigarettes    Smokeless tobacco: Never Used   Substance Use Topics    Alcohol use: Yes     Comment: drinks beer daily    Drug use: Yes     Types: Marijuana     Comment: heroin     Review of Systems   Neurological: Positive for numbness.   Psychiatric/Behavioral: Positive for dysphoric mood. Negative for agitation, hallucinations and suicidal ideas. The patient is nervous/anxious.    All other systems reviewed  and are negative.      Physical Exam     Initial Vitals [12/11/20 0031]   BP Pulse Resp Temp SpO2   136/79 106 (!) 21 98.4 °F (36.9 °C) 95 %      MAP       --         Physical Exam    Nursing note and vitals reviewed.  Constitutional: She appears well-developed and well-nourished. She is not diaphoretic. No distress.   HENT:   Head: Normocephalic and atraumatic. Head is without raccoon's eyes, without Pompa's sign, without abrasion and without contusion.   Right Ear: Tympanic membrane, external ear and ear canal normal. No hemotympanum.   Left Ear: Tympanic membrane, external ear and ear canal normal. No hemotympanum.   Nose: No nasal deformity, septal deviation or nasal septal hematoma.   Mouth/Throat: Uvula is midline. No oral lesions. No lacerations.   Eyes: Conjunctivae and EOM are normal. Pupils are equal, round, and reactive to light. No scleral icterus.   Neck: Normal range of motion. Neck supple. No JVD present.   Cardiovascular: Normal rate, regular rhythm, normal heart sounds and intact distal pulses. Exam reveals no gallop and no friction rub.    No murmur heard.  Pulmonary/Chest: Breath sounds normal. No respiratory distress. She has no wheezes. She has no rhonchi. She has no rales.   Abdominal: Soft. She exhibits no distension. There is no abdominal tenderness. There is no rebound and no guarding.   Musculoskeletal: Normal range of motion. No tenderness or edema.   Neurological: She is alert and oriented to person, place, and time.   Skin: Skin is warm and dry.   Psychiatric: She has a normal mood and affect. Her behavior is normal. Thought content normal.         ED Course   Procedures  Labs Reviewed   CBC W/ AUTO DIFFERENTIAL - Abnormal; Notable for the following components:       Result Value    RBC 3.78 (*)      (*)     MCH 34.7 (*)     Platelets 140 (*)     All other components within normal limits   COMPREHENSIVE METABOLIC PANEL - Abnormal; Notable for the following components:     Potassium 2.2 (*)     CO2 31 (*)     BUN 2 (*)     Calcium 8.6 (*)     Alkaline Phosphatase 145 (*)      (*)     ALT 75 (*)     All other components within normal limits    Narrative:     Potassium    critical result(s) called and verbal readback obtained   from Marlen Saldaña by [facility] 12/11/2020 03:42   URINALYSIS, REFLEX TO URINE CULTURE - Abnormal; Notable for the following components:    Specific Gravity, UA 1.000 (*)     All other components within normal limits    Narrative:     Specimen Source->Urine   DRUG SCREEN PANEL, URINE EMERGENCY - Abnormal; Notable for the following components:    Creatinine, Urine 7.9 (*)     All other components within normal limits    Narrative:     Specimen Source->Urine   ALCOHOL,MEDICAL (ETHANOL) - Abnormal; Notable for the following components:    Alcohol, Serum >450 (*)     All other components within normal limits    Narrative:     Alcohol    critical result(s) called and verbal readback obtained   from   Loan Manuel by [facility] 12/11/2020 02:27   SALICYLATE LEVEL - Abnormal; Notable for the following components:    Salicylate Lvl <5.0 (*)     All other components within normal limits   ACETAMINOPHEN LEVEL - Abnormal; Notable for the following components:    Acetaminophen (Tylenol), Serum <3.0 (*)     All other components within normal limits   MAGNESIUM   TSH   SARS-COV-2 RDRP GENE     EKG Readings: (Independently Interpreted)   EKG done at 3:58 a.m. showing normal sinus rhythm with sinus arrhythmia with a rate of 69.  No ST elevation.  Sweeping T-waves.  Normal axis and QRS.  QTC is 492.  Abnormal EKG but nonspecific.       Imaging Results    None          Medical Decision Making:   History:   Old Medical Records: I decided to obtain old medical records.  Initial Assessment:   Per chart review, patient has a history of bipolar depression and medication non-compliance. She was last seen here for anxiety on 10/8 of this year with labs indicative of alcohol abuse. Of note, the  patient has been seen in the ED twice, on 6/29/17 and 7/2/14, for alcohol intoxication.    45-year-old female coming today secondary to telling me that her ear has been hurting for over 8 months.  No obvious signs of cellulitis or abscess.  Inner ear is reassuring.  Patient smells of alcohol and seems extremely intoxicated.  IV fluids and turning lights out.  No obvious new trauma on the patient.  Old scrapes on her.  Will continue do frequent neurological checks on the patient.    Patient is resting    Labs are notable for a markedly elevated alcohol level.  Pending sobriety.  Marijuana positive.  Pending chemistries.    2:52 AM  Resting comfortably    Labs came back and she is markedly hypokalemic.  Patient states that she is willing to stay to get treated for her potassium.  Giving patient magnesium and a banana bag and oral potassium.  Also thiamine and folic acid.  Cardiac monitor.  Patient became a little agitated gave 2 mg of Ativan.  Spoke to the hospitalist team and admitted the patient to their service.  Neuro checks.  To on telemetry.    Please put in 35 minutes of critical care due to patient having a high risk of cardiac failure.   Separate from teaching and exclusive of procedure and ekg time  Includes:  Time at bedside  Time reviewing test results  Time discussing case with staff  Time documenting the medical record  Time spent with family members  Time spent with consults  Management    Clinical Tests:   Lab Tests: Ordered and Reviewed            Scribe Attestation:   Scribe #1: I performed the above scribed service and the documentation accurately describes the services I performed. I attest to the accuracy of the note.                      Clinical Impression:     ICD-10-CM ICD-9-CM   1. Hypokalemia  E87.6 276.8   2. Alcohol abuse  F10.10 305.00   3. Dehydration  E86.0 276.51                      Disposition:   Disposition: Discharged  Condition: Stable     ED Disposition Condition    Admit              I, brennon jimenez, personally performed the services described in this documentation. All medical record entries made by the scribe were at my direction and in my presence. I have reviewed the chart and agree that the record reflects my personal performance and is accurate and complete.                 Brennon Jimenez MD  12/11/20 0579

## 2020-12-11 NOTE — H&P
[facility] Medicine  History & Physical    Patient Name: Carmen Landon  MRN: [id number]  Admission Date: 12/11/2020  Attending Physician: Dmitry Egan DO   Primary Care Provider: LIZETH Holcomb         Patient information was obtained from patient and ER records.     Subjective:     Principal Problem:Hypokalemia    Chief Complaint:   Chief Complaint   Patient presents with    Depression     pt with ETOH brought in by EMS who states pt had an anxiety attack and reported lip numbness, on arrival pt is tearful and states she is sad. Unable to obtain any other information from pt        HPI:  Carmen Landon is 46 y/o female with anxiety, depression, bipolar, and schizophrenia who presents to the hospital with complaining of anxiousness, bilateral lip numbness, and dysphoric mood. Per ED note, patient was tearful and stated she was sad but would not give any further details with EMS. Upon ED arrival, patient stated that she was here due to right ear pain. She endorsed large amount of alcohol usage last night. Denied any other symptoms including any hallucinations or intent to harm herself or others. During my exam, patient is very lethargic after given Ativan in ED. She is aroused briefly with tactile stimuli but falls right back to sleep. History is limited due to patient's mental status. Of note, patient was seen here on 10/8 of this year with labs indicative of alcohol abuse. Of note, the patient has been seen in the ED twice, on 6/29/17 and 7/2/14, also for alcohol intoxication. Recently seen here 3 weeks ago on 11/24/2020 for back pain.    In ED, COVID-19 negative. Labs remarkable with K+ 2.2, elevated AST//75. Drug screen with THC positive. ETOH > 450.          Past Medical History:   Diagnosis Date    Allergy     Anemia     Anxiety     Bipolar 1 disorder     Depression     Major depression     Meningitis     Schizophrenia        Past Surgical  History:   Procedure Laterality Date    TUBAL LIGATION         Review of patient's allergies indicates:  No Known Allergies    No current facility-administered medications on file prior to encounter.      Current Outpatient Medications on File Prior to Encounter   Medication Sig    citalopram (CELEXA) 20 MG tablet Take 1 tablet (20 mg total) by mouth once daily.    methylPREDNISolone (MEDROL DOSEPACK) 4 mg tablet Take as directed    ondansetron (ZOFRAN) 4 MG tablet Take 1 tablet (4 mg total) by mouth every 6 (six) hours.    potassium chloride (KLOR-CON) 10 MEQ TbSR Take 1 tablet (10 mEq total) by mouth once daily.     Family History     Problem Relation (Age of Onset)    Alcohol abuse Mother    Heart disease Father    Kidney disease Mother        Tobacco Use    Smoking status: Current Every Day Smoker     Packs/day: 0.50     Types: Cigarettes    Smokeless tobacco: Never Used   Substance and Sexual Activity    Alcohol use: Yes     Comment: drinks beer daily    Drug use: Yes     Types: Marijuana     Comment: heroin    Sexual activity: Yes     Birth control/protection: See Surgical Hx     Review of Systems   Unable to perform ROS: Mental status change   HENT: Positive for ear pain (right).    Psychiatric/Behavioral: Positive for dysphoric mood. Negative for suicidal ideas. The patient is nervous/anxious.      Objective:     Vital Signs (Most Recent):  Temp: 98.1 °F (36.7 °C) (12/11/20 0637)  Pulse: 87 (12/11/20 0636)  Resp: 18 (12/11/20 0608)  BP: (!) 101/55 (12/11/20 0637)  SpO2: 95 % (12/11/20 0636) Vital Signs (24h Range):  Temp:  [98.1 °F (36.7 °C)-98.4 °F (36.9 °C)] 98.1 °F (36.7 °C)  Pulse:  [] 87  Resp:  [18-21] 18  SpO2:  [95 %-100 %] 95 %  BP: (101-141)/(55-94) 101/55     Weight: 61.2 kg (135 lb)  Body mass index is 24.69 kg/m².    Physical Exam  Constitutional:       General: She is sleeping. She is not in acute distress.     Appearance: She is not diaphoretic.   HENT:      Head:  Normocephalic.   Cardiovascular:      Rate and Rhythm: Normal rate and regular rhythm.      Pulses: Normal pulses.      Heart sounds: Normal heart sounds.   Pulmonary:      Effort: Pulmonary effort is normal. No respiratory distress.      Breath sounds: Normal breath sounds. No wheezing, rhonchi or rales.   Chest:      Chest wall: No tenderness.   Abdominal:      General: Bowel sounds are normal.      Palpations: Abdomen is soft.   Musculoskeletal:      Right lower leg: No edema.      Left lower leg: No edema.   Skin:     General: Skin is warm and dry.   Neurological:      Mental Status: She is oriented to person, place, and time. She is lethargic.      GCS: GCS eye subscore is 2. GCS verbal subscore is 5. GCS motor subscore is 6.   Psychiatric:         Speech: Speech is slurred.         Behavior: Behavior is slowed. Behavior is cooperative.             Significant Labs:   Recent Results (from the past 24 hour(s))   CBC auto differential    Collection Time: 12/11/20  1:20 AM   Result Value Ref Range    WBC 8.58 3.90 - 12.70 K/uL    RBC 3.78 (L) 4.00 - 5.40 M/uL    Hemoglobin 13.1 12.0 - 16.0 g/dL    Hematocrit 37.7 37.0 - 48.5 %     (H) 82 - 98 fL    MCH 34.7 (H) 27.0 - 31.0 pg    MCHC 34.7 32.0 - 36.0 g/dL    RDW 12.8 11.5 - 14.5 %    Platelets 140 (L) 150 - 350 K/uL    MPV 10.0 9.2 - 12.9 fL    Immature Granulocytes 0.3 0.0 - 0.5 %    Gran # (ANC) 3.9 1.8 - 7.7 K/uL    Immature Grans (Abs) 0.03 0.00 - 0.04 K/uL    Lymph # 3.7 1.0 - 4.8 K/uL    Mono # 0.8 0.3 - 1.0 K/uL    Eos # 0.1 0.0 - 0.5 K/uL    Baso # 0.08 0.00 - 0.20 K/uL    nRBC 0 0 /100 WBC    Gran % 45.6 38.0 - 73.0 %    Lymph % 42.8 18.0 - 48.0 %    Mono % 9.1 4.0 - 15.0 %    Eosinophil % 1.3 0.0 - 8.0 %    Basophil % 0.9 0.0 - 1.9 %    Differential Method Automated    Comprehensive metabolic panel    Collection Time: 12/11/20  1:20 AM   Result Value Ref Range    Sodium 142 136 - 145 mmol/L    Potassium 2.2 (LL) 3.5 - 5.1 mmol/L    Chloride 98 95  - 110 mmol/L    CO2 31 (H) 23 - 29 mmol/L    Glucose 106 70 - 110 mg/dL    BUN 2 (L) 6 - 20 mg/dL    Creatinine 0.6 0.5 - 1.4 mg/dL    Calcium 8.6 (L) 8.7 - 10.5 mg/dL    Total Protein 7.9 6.0 - 8.4 g/dL    Albumin 3.6 3.5 - 5.2 g/dL    Total Bilirubin 0.8 0.1 - 1.0 mg/dL    Alkaline Phosphatase 145 (H) 55 - 135 U/L     (H) 10 - 40 U/L    ALT 75 (H) 10 - 44 U/L    Anion Gap 13 8 - 16 mmol/L    eGFR if African American >60 >60 mL/min/1.73 m^2    eGFR if non African American >60 >60 mL/min/1.73 m^2   Magnesium    Collection Time: 12/11/20  1:20 AM   Result Value Ref Range    Magnesium 1.6 1.6 - 2.6 mg/dL   TSH    Collection Time: 12/11/20  1:20 AM   Result Value Ref Range    TSH 2.143 0.400 - 4.000 uIU/mL   Ethanol    Collection Time: 12/11/20  1:20 AM   Result Value Ref Range    Alcohol, Serum >450 (HH) <10 mg/dL   Salicylate level    Collection Time: 12/11/20  1:20 AM   Result Value Ref Range    Salicylate Lvl <5.0 (L) 15.0 - 30.0 mg/dL   Acetaminophen level    Collection Time: 12/11/20  1:20 AM   Result Value Ref Range    Acetaminophen (Tylenol), Serum <3.0 (L) 10.0 - 20.0 ug/mL   Urinalysis, Reflex to Urine Culture Urine, Clean Catch    Collection Time: 12/11/20  1:45 AM    Specimen: Urine   Result Value Ref Range    Specimen UA Urine, Clean Catch     Color, UA Straw Yellow, Straw, Natalie    Appearance, UA Clear Clear    pH, UA 8.0 5.0 - 8.0    Specific Gravity, UA 1.000 (A) 1.005 - 1.030    Protein, UA Negative Negative    Glucose, UA Negative Negative    Ketones, UA Negative Negative    Bilirubin (UA) Negative Negative    Occult Blood UA Negative Negative    Nitrite, UA Negative Negative    Urobilinogen, UA Negative <2.0 EU/dL    Leukocytes, UA Negative Negative   Drug screen panel, emergency    Collection Time: 12/11/20  1:45 AM   Result Value Ref Range    Benzodiazepines Negative     Methadone metabolites Negative     Cocaine (Metab.) Negative     Opiate Scrn, Ur Negative     Barbiturate Screen, Ur  Negative     Amphetamine Screen, Ur Negative     THC Presumptive Positive     Phencyclidine Negative     Creatinine, Urine 7.9 (L) 15.0 - 325.0 mg/dL    Toxicology Information SEE COMMENT    POCT COVID-19 Rapid Screening    Collection Time: 12/11/20  1:50 AM   Result Value Ref Range    POC Rapid COVID Negative Negative     Acceptable Yes          Significant Imaging:     Imaging Results    None       Assessment/Plan:     * Hypokalemia    K+ 2.2 today. Mg 1.6.  EKG 69 NSR with NSST and prolonged QTc 492. Denies GI symptoms. Likely from alcohol abuse.    - Replaced potasium and magnesium   - Repeat labs    Alcohol use  ETOH > 450 on admit. Admits drinking last night.    - Banana bag x 1  - IVF  - Thiamine, folic acid, and multiple vit  - CIWA perform  - Alcohol withdrawal protocol with Ativan prn  - Encourage to quit when more awake      Transaminitis  Elevated AST//75 likely 2/2 from alcohol abuse.    - Encourage to quit.  - Follow up liver function outpatient      Psychiatric illness  Hx bipolar, schizophrenia, anxiety/depression. Presents with dysphoric mood with crying and expressing sadness, but denies SI or hallucinations. Off medications. Drug screen with THC positive. Alcohol intoxicated today.    - Consult Psychiatry      VTE Risk Mitigation (From admission, onward)         Ordered     IP VTE LOW RISK PATIENT  Once      12/11/20 0554     Place sequential compression device  Until discontinued      12/11/20 0554     Place KEZIA hose  Until discontinued      12/11/20 0554                   Carine Park NP  Department of Hospital Medicine   [facility]

## 2020-12-11 NOTE — ASSESSMENT & PLAN NOTE
Elevated AST//75 likely 2/2 from alcohol abuse.    - Encourage to quit.  - Follow up liver function outpatient

## 2020-12-11 NOTE — NURSING
Patient just arrived to the floor. Fluids, B1,  Mag and banana bag infusing. Patient resting with eyes closed. Rise and fall of chest noted. No signs of distress at this time. Report received from off going nurse Germaine. Bed low and locked. Call light in reach. Will continue to monitor.

## 2020-12-11 NOTE — ASSESSMENT & PLAN NOTE
ETOH > 450 on admit. Admits drinking last night.    - Banana bag x 1  - IVF  - Thiamine, folic acid, and multiple vit  - CIWA perform  - Alcohol withdrawal protocol with Ativan prn  - Encourage to quit when more awake

## 2020-12-11 NOTE — SUBJECTIVE & OBJECTIVE
Past Medical History:   Diagnosis Date    Allergy     Anemia     Anxiety     Bipolar 1 disorder     Depression     Major depression     Meningitis     Schizophrenia        Past Surgical History:   Procedure Laterality Date    TUBAL LIGATION         Review of patient's allergies indicates:  No Known Allergies    No current facility-administered medications on file prior to encounter.      Current Outpatient Medications on File Prior to Encounter   Medication Sig    citalopram (CELEXA) 20 MG tablet Take 1 tablet (20 mg total) by mouth once daily.    methylPREDNISolone (MEDROL DOSEPACK) 4 mg tablet Take as directed    ondansetron (ZOFRAN) 4 MG tablet Take 1 tablet (4 mg total) by mouth every 6 (six) hours.    potassium chloride (KLOR-CON) 10 MEQ TbSR Take 1 tablet (10 mEq total) by mouth once daily.     Family History     Problem Relation (Age of Onset)    Alcohol abuse Mother    Heart disease Father    Kidney disease Mother        Tobacco Use    Smoking status: Current Every Day Smoker     Packs/day: 0.50     Types: Cigarettes    Smokeless tobacco: Never Used   Substance and Sexual Activity    Alcohol use: Yes     Comment: drinks beer daily    Drug use: Yes     Types: Marijuana     Comment: heroin    Sexual activity: Yes     Birth control/protection: See Surgical Hx     Review of Systems   Unable to perform ROS: Mental status change   HENT: Positive for ear pain (right).    Psychiatric/Behavioral: Positive for dysphoric mood. Negative for suicidal ideas. The patient is nervous/anxious.      Objective:     Vital Signs (Most Recent):  Temp: 98.1 °F (36.7 °C) (12/11/20 0637)  Pulse: 87 (12/11/20 0636)  Resp: 18 (12/11/20 0608)  BP: (!) 101/55 (12/11/20 0637)  SpO2: 95 % (12/11/20 0636) Vital Signs (24h Range):  Temp:  [98.1 °F (36.7 °C)-98.4 °F (36.9 °C)] 98.1 °F (36.7 °C)  Pulse:  [] 87  Resp:  [18-21] 18  SpO2:  [95 %-100 %] 95 %  BP: (101-141)/(55-94) 101/55     Weight: 61.2 kg (135 lb)  Body  mass index is 24.69 kg/m².    Physical Exam  Constitutional:       General: She is sleeping. She is not in acute distress.     Appearance: She is not diaphoretic.   HENT:      Head: Normocephalic.   Cardiovascular:      Rate and Rhythm: Normal rate and regular rhythm.      Pulses: Normal pulses.      Heart sounds: Normal heart sounds.   Pulmonary:      Effort: Pulmonary effort is normal. No respiratory distress.      Breath sounds: Normal breath sounds. No wheezing, rhonchi or rales.   Chest:      Chest wall: No tenderness.   Abdominal:      General: Bowel sounds are normal.      Palpations: Abdomen is soft.   Musculoskeletal:      Right lower leg: No edema.      Left lower leg: No edema.   Skin:     General: Skin is warm and dry.   Neurological:      Mental Status: She is oriented to person, place, and time. She is lethargic.      GCS: GCS eye subscore is 2. GCS verbal subscore is 5. GCS motor subscore is 6.   Psychiatric:         Speech: Speech is slurred.         Behavior: Behavior is slowed. Behavior is cooperative.             Significant Labs:   Recent Results (from the past 24 hour(s))   CBC auto differential    Collection Time: 12/11/20  1:20 AM   Result Value Ref Range    WBC 8.58 3.90 - 12.70 K/uL    RBC 3.78 (L) 4.00 - 5.40 M/uL    Hemoglobin 13.1 12.0 - 16.0 g/dL    Hematocrit 37.7 37.0 - 48.5 %     (H) 82 - 98 fL    MCH 34.7 (H) 27.0 - 31.0 pg    MCHC 34.7 32.0 - 36.0 g/dL    RDW 12.8 11.5 - 14.5 %    Platelets 140 (L) 150 - 350 K/uL    MPV 10.0 9.2 - 12.9 fL    Immature Granulocytes 0.3 0.0 - 0.5 %    Gran # (ANC) 3.9 1.8 - 7.7 K/uL    Immature Grans (Abs) 0.03 0.00 - 0.04 K/uL    Lymph # 3.7 1.0 - 4.8 K/uL    Mono # 0.8 0.3 - 1.0 K/uL    Eos # 0.1 0.0 - 0.5 K/uL    Baso # 0.08 0.00 - 0.20 K/uL    nRBC 0 0 /100 WBC    Gran % 45.6 38.0 - 73.0 %    Lymph % 42.8 18.0 - 48.0 %    Mono % 9.1 4.0 - 15.0 %    Eosinophil % 1.3 0.0 - 8.0 %    Basophil % 0.9 0.0 - 1.9 %    Differential Method Automated     Comprehensive metabolic panel    Collection Time: 12/11/20  1:20 AM   Result Value Ref Range    Sodium 142 136 - 145 mmol/L    Potassium 2.2 (LL) 3.5 - 5.1 mmol/L    Chloride 98 95 - 110 mmol/L    CO2 31 (H) 23 - 29 mmol/L    Glucose 106 70 - 110 mg/dL    BUN 2 (L) 6 - 20 mg/dL    Creatinine 0.6 0.5 - 1.4 mg/dL    Calcium 8.6 (L) 8.7 - 10.5 mg/dL    Total Protein 7.9 6.0 - 8.4 g/dL    Albumin 3.6 3.5 - 5.2 g/dL    Total Bilirubin 0.8 0.1 - 1.0 mg/dL    Alkaline Phosphatase 145 (H) 55 - 135 U/L     (H) 10 - 40 U/L    ALT 75 (H) 10 - 44 U/L    Anion Gap 13 8 - 16 mmol/L    eGFR if African American >60 >60 mL/min/1.73 m^2    eGFR if non African American >60 >60 mL/min/1.73 m^2   Magnesium    Collection Time: 12/11/20  1:20 AM   Result Value Ref Range    Magnesium 1.6 1.6 - 2.6 mg/dL   TSH    Collection Time: 12/11/20  1:20 AM   Result Value Ref Range    TSH 2.143 0.400 - 4.000 uIU/mL   Ethanol    Collection Time: 12/11/20  1:20 AM   Result Value Ref Range    Alcohol, Serum >450 (HH) <10 mg/dL   Salicylate level    Collection Time: 12/11/20  1:20 AM   Result Value Ref Range    Salicylate Lvl <5.0 (L) 15.0 - 30.0 mg/dL   Acetaminophen level    Collection Time: 12/11/20  1:20 AM   Result Value Ref Range    Acetaminophen (Tylenol), Serum <3.0 (L) 10.0 - 20.0 ug/mL   Urinalysis, Reflex to Urine Culture Urine, Clean Catch    Collection Time: 12/11/20  1:45 AM    Specimen: Urine   Result Value Ref Range    Specimen UA Urine, Clean Catch     Color, UA Straw Yellow, Straw, Natalie    Appearance, UA Clear Clear    pH, UA 8.0 5.0 - 8.0    Specific Gravity, UA 1.000 (A) 1.005 - 1.030    Protein, UA Negative Negative    Glucose, UA Negative Negative    Ketones, UA Negative Negative    Bilirubin (UA) Negative Negative    Occult Blood UA Negative Negative    Nitrite, UA Negative Negative    Urobilinogen, UA Negative <2.0 EU/dL    Leukocytes, UA Negative Negative   Drug screen panel, emergency    Collection Time: 12/11/20  1:45  AM   Result Value Ref Range    Benzodiazepines Negative     Methadone metabolites Negative     Cocaine (Metab.) Negative     Opiate Scrn, Ur Negative     Barbiturate Screen, Ur Negative     Amphetamine Screen, Ur Negative     THC Presumptive Positive     Phencyclidine Negative     Creatinine, Urine 7.9 (L) 15.0 - 325.0 mg/dL    Toxicology Information SEE COMMENT    POCT COVID-19 Rapid Screening    Collection Time: 12/11/20  1:50 AM   Result Value Ref Range    POC Rapid COVID Negative Negative     Acceptable Yes          Significant Imaging:     Imaging Results    None

## 2020-12-11 NOTE — ASSESSMENT & PLAN NOTE
Hx bipolar, schizophrenia, anxiety/depression. Presents with dysphoric mood with crying and expressing sadness, but denies SI or hallucinations. Off medications. Drug screen with THC positive. Alcohol intoxicated today.    - Consult Psychiatry

## 2020-12-11 NOTE — SUBJECTIVE & OBJECTIVE
Patient History           Medical as of 12/11/2020     Past Medical History     Diagnosis Date Comments Source    Addiction to drug -- -- Provider    Alcohol abuse -- -- Provider    Allergy -- -- Provider    Anemia -- -- Provider    History of psychiatric hospitalization -- alcohol related Provider    Hx of psychiatric care -- -- Provider    Meningitis -- -- Provider    Psychiatric problem -- -- Provider          Pertinent Negatives     Diagnosis Date Noted Comments Source    Suicide attempt 12/11/2020 -- Provider    Therapy 12/11/2020 -- Provider                  Surgical as of 12/11/2020     Past Surgical History     Procedure Laterality Date Comments Source    TUBAL LIGATION -- -- -- Provider                  Family as of 12/11/2020     Problem Relation Name Age of Onset Comments Source    Alcohol abuse Mother -- -- -- Provider    Kidney disease Mother -- -- -- Provider    Heart disease Father -- -- -- Provider            Tobacco Use as of 12/11/2020     Smoking Status Smoking Start Date Smoking Quit Date Packs/Day Years Used    Current Every Day Smoker -- -- 0.50 --    Types Comments Smokeless Tobacco Status Smokeless Tobacco Quit Date Source     Cigarettes -- Never Used -- Provider            Alcohol Use as of 12/11/2020     Alcohol Use Drinks/Week Alcohol/Week Comments Source    Yes   -- 1 pint whiskey daily and additional beers Provider    Frequency Typical Drinks Binge Drinking        -- -- --              Drug Use as of 12/11/2020     Drug Use Types Frequency Comments Source    Yes  Marijuana -- history of heroin and cocaine Provider            Sexual Activity as of 12/11/2020     Sexually Active Birth Control Partners Comments Source    Yes See Surgical Hx -- -- Provider            Activities of Daily Living as of 12/11/2020     Activities of Daily Living Question Response Comments Source    Patient feels they ought to cut down on drinking/drug use Not Asked -- Provider    Patient annoyed by others  criticizing their drinking/drug use Not Asked -- Provider    Patient has felt bad or guilty about drinking/drug use Not Asked -- Provider    Patient has had a drink/used drugs as an eye opener in the AM Not Asked -- Provider            Social Documentation as of 12/11/2020    None           Occupational as of 12/11/2020    None           Socioeconomic as of 12/11/2020     Marital Status Spouse Name Number of Children Years Education Education Level Preferred Language Ethnicity Race Source    Single -- 2 -- -- English /White White Provider    Financial Resource Strain Food Insecurity: Worry Food Insecurity: Inability Transportation Needs: Medical Transportation Needs: Non-medical    -- -- -- -- --            Pertinent History     Question Response Comments    Lives with friends --    Place in Birth Order -- --    Lives in home --    Number of Siblings -- --    Raised by -- grew up in [facility]    Legal Involvement -- --    Childhood Trauma -- --    Criminal History of -- --    Financial Status employed     Highest Level of Education high school graduation --    Does patient have access to a firearm? No --     Service No --    Primary Leisure Activity -- --    Spirituality non-practicing --        Past Medical History:   Diagnosis Date    Addiction to drug     Alcohol abuse     Allergy     Anemia     History of psychiatric hospitalization     alcohol related    Hx of psychiatric care     Meningitis     Psychiatric problem      Past Surgical History:   Procedure Laterality Date    TUBAL LIGATION       Family History     Problem Relation (Age of Onset)    Alcohol abuse Mother    Heart disease Father    Kidney disease Mother        Tobacco Use    Smoking status: Current Every Day Smoker     Packs/day: 0.50     Types: Cigarettes    Smokeless tobacco: Never Used   Substance and Sexual Activity    Alcohol use: Yes     Comment: 1 pint whiskey daily and additional beers    Drug  use: Yes     Types: Marijuana     Comment: history of heroin and cocaine    Sexual activity: Yes     Birth control/protection: See Surgical Hx     Review of patient's allergies indicates:  No Known Allergies    No current facility-administered medications on file prior to encounter.      Current Outpatient Medications on File Prior to Encounter   Medication Sig    citalopram (CELEXA) 20 MG tablet Take 1 tablet (20 mg total) by mouth once daily.    methylPREDNISolone (MEDROL DOSEPACK) 4 mg tablet Take as directed    ondansetron (ZOFRAN) 4 MG tablet Take 1 tablet (4 mg total) by mouth every 6 (six) hours.    potassium chloride (KLOR-CON) 10 MEQ TbSR Take 1 tablet (10 mEq total) by mouth once daily.     Psychotherapeutics (From admission, onward)    Start     Stop Route Frequency Ordered    12/11/20 0900  citalopram tablet 20 mg      -- Oral Daily 12/11/20 0554    12/11/20 0656  LORazepam injection 2 mg      -- IV Every 4 hours PRN 12/11/20 0556        Review of Systems   Constitutional: Positive for activity change and fatigue.   Respiratory: Negative for shortness of breath.    Cardiovascular: Negative for chest pain.   Gastrointestinal: Negative for nausea.   Musculoskeletal: Positive for myalgias.   Neurological: Positive for tremors and weakness.        Balance problems   Psychiatric/Behavioral: Negative for decreased concentration, dysphoric mood, sleep disturbance and suicidal ideas. The patient is nervous/anxious.      Strengths and Liabilities: Liability: Patient is defensive., Liability: Patient is dependent., Liability: Patient has poor judgment, Liability: Patient lacks coping skills.    Objective:     Vital Signs (Most Recent):  Temp: 99.6 °F (37.6 °C) (12/11/20 1139)  Pulse: 97 (12/11/20 1139)  Resp: 18 (12/11/20 1139)  BP: 134/80 (12/11/20 1139)  SpO2: (!) 94 % (12/11/20 1139) Vital Signs (24h Range):  Temp:  [97.6 °F (36.4 °C)-99.6 °F (37.6 °C)] 99.6 °F (37.6 °C)  Pulse:  [] 97  Resp:   "[18-21] 18  SpO2:  [92 %-100 %] 94 %  BP: ()/(46-94) 134/80     Height: 5' 2" (157.5 cm)  Weight: 60 kg (132 lb 4.4 oz)  Body mass index is 24.19 kg/m².      Intake/Output Summary (Last 24 hours) at 12/11/2020 1414  Last data filed at 12/11/2020 1253  Gross per 24 hour   Intake 2240 ml   Output 1250 ml   Net 990 ml       Physical Exam  Psychiatric:      Comments: EXAMINATION    CONSTITUTIONAL  General Appearance: hospital attire    MUSCULOSKELETAL  Muscle Strength and Tone: weak  Abnormal Involuntary Movements: tremor noted  Gait and Station: not observed    PSYCHIATRIC MENTAL STATUS EXAM   Level of Consciousness: awake and alert  Orientation: name, place, date, situation  Grooming: limited  Psychomotor Behavior: restless  Speech: normal rate, normal volume  Language: no abnormalities  Mood: "good"  Affect: anxious  Thought Process: linear  Associations: intact  Thought Content: denies suicidal/homicidal/psychosis  Memory: intact to recent and remote  Attention: somewhat distracted  Fund of Knowledge: simple to conversation  Insight: poor into alcohol use  Judgment: limited into wanting sobriety          Finger to nose ataxia with broadening tremor at intention  Hand flap asterixis noted            Significant Labs:   Last 24 Hours:   Recent Lab Results       12/11/20  1218   12/11/20  0835   12/11/20  0150   12/11/20  0145   12/11/20  0120        Benzodiazepines       Negative       Methadone metabolites       Negative       Phencyclidine       Negative       Acetaminophen (Tylenol), Serum         <3.0  Comment:  Toxic Levels:  Adults (4 hr post-ingestion).........>150 ug/mL  Adults (12 hr post-ingestion)........>40 ug/mL  Peds (2 hr post-ingestion, liquid)...>225 ug/mL       Albumin 3.1 3.0     3.6     Alcohol, Serum         >450  Comment:  Alcohol    critical result(s) called and verbal readback obtained   from   Loan Jackson by [facility] [place] 12/11/2020 02:27       Alkaline Phosphatase   112     145     ALT   67     " 75     Amphetamine Screen, Ur       Negative       Anion Gap 10 8     13     Appearance, UA       Clear       AST   281     326     Barbiturate Screen, Ur       Negative       Baso #   0.04     0.08     Basophil %   0.8     0.9     Bilirubin (UA)       Negative       BILIRUBIN TOTAL   0.9  Comment:  For infants and newborns, interpretation of results should be based  on gestational age, weight and in agreement with clinical  observations.  Premature Infant recommended reference ranges:  Up to 24 hours.............<8.0 mg/dL  Up to 48 hours............<12.0 mg/dL  3-5 days..................<15.0 mg/dL  6-29 days.................<15.0 mg/dL       0.8  Comment:  For infants and newborns, interpretation of results should be based  on gestational age, weight and in agreement with clinical  observations.  Premature Infant recommended reference ranges:  Up to 24 hours.............<8.0 mg/dL  Up to 48 hours............<12.0 mg/dL  3-5 days..................<15.0 mg/dL  6-29 days.................<15.0 mg/dL       BUN 2 2     2     Calcium 7.3 7.1     8.6     Chloride 104 107     98     CO2 27 29     31     Cocaine (Metab.)       Negative       Color, UA       Straw       Creatinine 0.5 0.5     0.6     Creatinine, Urine       7.9  Comment:  The random urine reference ranges provided were established   for 24 hour urine collections.  No reference ranges exist for  random urine specimens.  Correlate clinically.         Differential Method   Automated     Automated     eGFR if  >60 >60     >60     eGFR if non  >60  Comment:  Calculation used to obtain the estimated glomerular filtration  rate (eGFR) is the CKD-EPI equation.    >60  Comment:  Calculation used to obtain the estimated glomerular filtration  rate (eGFR) is the CKD-EPI equation.        >60  Comment:  Calculation used to obtain the estimated glomerular filtration  rate (eGFR) is the CKD-EPI equation.        Eos #   0.1     0.1      Eosinophil %   1.6     1.3     Glucose 90 86     106     Glucose, UA       Negative       Gran # (ANC)   2.3     3.9     Gran %   46.2     45.6     Hematocrit   33.1     37.7     Hemoglobin   11.6     13.1     Immature Grans (Abs)   0.01  Comment:  Mild elevation in immature granulocytes is non specific and   can be seen in a variety of conditions including stress response,   acute inflammation, trauma and pregnancy. Correlation with other   laboratory and clinical findings is essential.       0.03  Comment:  Mild elevation in immature granulocytes is non specific and   can be seen in a variety of conditions including stress response,   acute inflammation, trauma and pregnancy. Correlation with other   laboratory and clinical findings is essential.       Immature Granulocytes   0.2     0.3     Ketones, UA       Negative       Leukocytes, UA       Negative       Lymph #   2.1     3.7     Lymph %   42.3     42.8     Magnesium   1.5     1.6     MCH   34.7     34.7     MCHC   35.0     34.7     MCV   99     100     Mono #   0.4     0.8     Mono %   8.9     9.1     MPV   10.0     10.0     NITRITE UA       Negative       nRBC   0     0     Occult Blood UA       Negative       Opiate Scrn, Ur       Negative       pH, UA       8.0       Phosphorus 2.1 2.2           Platelets   123     140     Potassium 3.3 2.6  Comment:  Potassium critical result(s) called and verbal readback obtained from   Areli Lora by [facility] 12/11/2020 09:36       2.2  Comment:  Potassium    critical result(s) called and verbal readback obtained   from Marlen Saldaña by [facility] [place] 12/11/2020 03:42       PROTEIN TOTAL   6.6     7.9     Protein, UA       Negative  Comment:  Recommend a 24 hour urine protein or a urine   protein/creatinine ratio if globulin induced proteinuria is  clinically suspected.          Acceptable     Yes         RBC   3.34     3.78     RDW   13.2     12.8     Salicylate Lvl         <5.0  Comment:  Toxic:  30.0 - 70.0  mg/dl  Lethal: >70.0 mg/dl       SARS-CoV-2 RNA, Amplification, Qual     Negative         Sodium 141 144     142     Specific [place], UA       1.000       Specimen UA       Urine, Clean Catch       Marijuana (THC) Metabolite       Presumptive Positive       Toxicology Information       SEE COMMENT  Comment:  This screen includes the following classes of drugs at the   listed cut-off:  Benzodiazepines                  200 ng/ml  Methadone                        300 ng/ml  Cocaine metabolite               300 ng/ml  Opiates                          300 ng/ml  Barbiturates                     200 ng/ml  Amphetamines                    1000 ng/ml  Marijuana metabs (THC)            50 ng/ml  Phencyclidine (PCP)               25 ng/ml  High concentrations of Diphenhydramine may cross-react with  Phencyclidine PCP screening immunoassay giving a false   positive result.  High concentrations of Methylenedioxymethamphetamine (MDMA aka  Ectasy) and other structurally similar compounds may cross-   react with the Amphetamine/Methamphetamine screening   immunoassay giving a false positive result.  A metabolite of the anti-HIV drug Sustiva () may cause  false positive results in the Marijuana metabolite (THC)   screening assay.  Note: This exception list includes only more common   interferants in toxicology screen testing.  Because of many   cross-reactantspositive results on toxicology drug screens   should be confirmed whenever results do not correlate with   clinical presentation.  This report is intended for use in clinical monitoring and  management of patients. It is not intended for use in   employment related drug testing.  Because of any cross-reactants, positive results on toxicology  drug screens should be confirmed whenever results do not  correlate with clinical presentation.  Presumptive positive results are unconfirmed and may be used   only for medical purposes.  Assay Intended Use: This assay provides  only a preliminary analytical  test result. A more specific alternate chemical method must be used  to obtain a confirmed analytical result. Gas chromatography/mass  spectrometry (GS/MS)is the preferred confirmatory method. Clinical  consideration and professional judgement should be applied to any   drug of abuse test result, particularly when preliminary results  are used.         TSH         2.143     UROBILINOGEN UA       Negative       WBC   4.94     8.58                        All pertinent labs within the past 24 hours have been reviewed.    Significant Imaging: I have reviewed all pertinent imaging results/findings within the past 24 hours.

## 2020-12-11 NOTE — ED TRIAGE NOTES
Pt presents to ED with ETOH onboard, while getting pt undress pt unable to sit up without leaning to left and right, had to assist out of clothes. Pt fell asleep while placing on cardiac monitor.

## 2020-12-11 NOTE — CONSULTS
"[facility]  Psychiatry  Consult Note    Patient Name: Carmen Landon  MRN: [id number]   Code Status: Full Code  Admission Date: 12/11/2020  Hospital Length of Stay: 0 days  Attending Physician: Dmitry Egan DO  Primary Care Provider: LIZETH Holcomb    Current Legal Status: Uncontested    Patient information was obtained from patient, chart review, nursing,  and ER records.   Inpatient consult to Psychiatry  Consult performed by: Cheo Snider MD  Consult ordered by: Carine Park NP        Subjective:     Principal Problem:Hypokalemia    Chief Complaint:  Alcohol use     HPI: Patient Carmen Landon presents to the hospital with anxiousness, bilateral lip numbness, and dysphoric mood.  Found in ED, labs remarkable with K+ 2.2, elevated AST//75. Drug screen with THC positive. ETOH > 450.  Psychiatric consult placed for evaluation of symptoms at presentation.  Chart notes that patient has other presentations for alcohol intoxication.  She is easily approached in conversation and says that she is here because she feels like she has something in her ear messing with hearing and balance.  She feels like it is something physical in the canal.  She minimizes any symptoms of depression and is not currently depressed.  No loss of interest.  Says that she lacks energy and motivation to do things that she would like to do.  Says that her sleep is good.  No suicidal ideations current or past.  Admits to being a worrisome person who worries about things that she should not worry about.  Worries exacerbate tremors which is a daily thing.  Tremors are from morning until she passes out intoxicated each night.  Denies any manic or psychotic history outside of withdrawal which got her into a psychiatric hospital years ago.  She works as a / and drinks 1 pint of whiskey in addition to "few" beers daily.  Has been doing this for years.  Past rehab at [facility] " "house.  Says that she was at rehab "for a while" and remained sober while there but started drinking upon discharge.  No maintained sobriety.  She did not return to cocaine or heroin after discharge from rehab but does smoke marijuana daily.      Hospital Course: No notes on file         Patient History           Medical as of 12/11/2020     Past Medical History     Diagnosis Date Comments Source    Addiction to drug -- -- Provider    Alcohol abuse -- -- Provider    Allergy -- -- Provider    Anemia -- -- Provider    History of psychiatric hospitalization -- alcohol related Provider    Hx of psychiatric care -- -- Provider    Meningitis -- -- Provider    Psychiatric problem -- -- Provider          Pertinent Negatives     Diagnosis Date Noted Comments Source    Suicide attempt 12/11/2020 -- Provider    Therapy 12/11/2020 -- Provider                  Surgical as of 12/11/2020     Past Surgical History     Procedure Laterality Date Comments Source    TUBAL LIGATION -- -- -- Provider                  Family as of 12/11/2020     Problem Relation Name Age of Onset Comments Source    Alcohol abuse Mother -- -- -- Provider    Kidney disease Mother -- -- -- Provider    Heart disease Father -- -- -- Provider            Tobacco Use as of 12/11/2020     Smoking Status Smoking Start Date Smoking Quit Date Packs/Day Years Used    Current Every Day Smoker -- -- 0.50 --    Types Comments Smokeless Tobacco Status Smokeless Tobacco Quit Date Source     Cigarettes -- Never Used -- Provider            Alcohol Use as of 12/11/2020     Alcohol Use Drinks/Week Alcohol/Week Comments Source    Yes   -- 1 pint whiskey daily and additional beers Provider    Frequency Typical Drinks Binge Drinking        -- -- --              Drug Use as of 12/11/2020     Drug Use Types Frequency Comments Source    Yes  Marijuana -- history of heroin and cocaine Provider            Sexual Activity as of 12/11/2020     Sexually Active Birth Control Partners " Comments Source    Yes See Surgical Hx -- -- Provider            Activities of Daily Living as of 12/11/2020     Activities of Daily Living Question Response Comments Source    Patient feels they ought to cut down on drinking/drug use Not Asked -- Provider    Patient annoyed by others criticizing their drinking/drug use Not Asked -- Provider    Patient has felt bad or guilty about drinking/drug use Not Asked -- Provider    Patient has had a drink/used drugs as an eye opener in the AM Not Asked -- Provider            Social Documentation as of 12/11/2020    None           Occupational as of 12/11/2020    None           Socioeconomic as of 12/11/2020     Marital Status Spouse Name Number of Children Years Education Education Level Preferred Language Ethnicity Race Source    Single -- 2 -- -- English /White White Provider    Financial Resource Strain Food Insecurity: Worry Food Insecurity: Inability Transportation Needs: Medical Transportation Needs: Non-medical    -- -- -- -- --            Pertinent History     Question Response Comments    Lives with friends --    Place in Birth Order -- --    Lives in home --    Number of Siblings -- --    Raised by -- grew up in [facility]    Legal Involvement -- --    Childhood Trauma -- --    Criminal History of -- --    Financial Status employed     Highest Level of Education high school graduation --    Does patient have access to a firearm? No --     Service No --    Primary Leisure Activity -- --    Spirituality non-practicing --        Past Medical History:   Diagnosis Date    Addiction to drug     Alcohol abuse     Allergy     Anemia     History of psychiatric hospitalization     alcohol related    Hx of psychiatric care     Meningitis     Psychiatric problem      Past Surgical History:   Procedure Laterality Date    TUBAL LIGATION       Family History     Problem Relation (Age of Onset)    Alcohol abuse Mother    Heart disease  Father    Kidney disease Mother        Tobacco Use    Smoking status: Current Every Day Smoker     Packs/day: 0.50     Types: Cigarettes    Smokeless tobacco: Never Used   Substance and Sexual Activity    Alcohol use: Yes     Comment: 1 pint whiskey daily and additional beers    Drug use: Yes     Types: Marijuana     Comment: history of heroin and cocaine    Sexual activity: Yes     Birth control/protection: See Surgical Hx     Review of patient's allergies indicates:  No Known Allergies    No current facility-administered medications on file prior to encounter.      Current Outpatient Medications on File Prior to Encounter   Medication Sig    citalopram (CELEXA) 20 MG tablet Take 1 tablet (20 mg total) by mouth once daily.    methylPREDNISolone (MEDROL DOSEPACK) 4 mg tablet Take as directed    ondansetron (ZOFRAN) 4 MG tablet Take 1 tablet (4 mg total) by mouth every 6 (six) hours.    potassium chloride (KLOR-CON) 10 MEQ TbSR Take 1 tablet (10 mEq total) by mouth once daily.     Psychotherapeutics (From admission, onward)    Start     Stop Route Frequency Ordered    12/11/20 0900  citalopram tablet 20 mg      -- Oral Daily 12/11/20 0554    12/11/20 0656  LORazepam injection 2 mg      -- IV Every 4 hours PRN 12/11/20 0556        Review of Systems   Constitutional: Positive for activity change and fatigue.   Respiratory: Negative for shortness of breath.    Cardiovascular: Negative for chest pain.   Gastrointestinal: Negative for nausea.   Musculoskeletal: Positive for myalgias.   Neurological: Positive for tremors and weakness.        Balance problems   Psychiatric/Behavioral: Negative for decreased concentration, dysphoric mood, sleep disturbance and suicidal ideas. The patient is nervous/anxious.      Strengths and Liabilities: Liability: Patient is defensive., Liability: Patient is dependent., Liability: Patient has poor judgment, Liability: Patient lacks coping skills.    Objective:     Vital Signs  "(Most Recent):  Temp: 99.6 °F (37.6 °C) (12/11/20 1139)  Pulse: 97 (12/11/20 1139)  Resp: 18 (12/11/20 1139)  BP: 134/80 (12/11/20 1139)  SpO2: (!) 94 % (12/11/20 1139) Vital Signs (24h Range):  Temp:  [97.6 °F (36.4 °C)-99.6 °F (37.6 °C)] 99.6 °F (37.6 °C)  Pulse:  [] 97  Resp:  [18-21] 18  SpO2:  [92 %-100 %] 94 %  BP: ()/(46-94) 134/80     Height: 5' 2" (157.5 cm)  Weight: 60 kg (132 lb 4.4 oz)  Body mass index is 24.19 kg/m².      Intake/Output Summary (Last 24 hours) at 12/11/2020 1414  Last data filed at 12/11/2020 1253  Gross per 24 hour   Intake 2240 ml   Output 1250 ml   Net 990 ml       Physical Exam  Psychiatric:      Comments: EXAMINATION    CONSTITUTIONAL  General Appearance: hospital attire    MUSCULOSKELETAL  Muscle Strength and Tone: weak  Abnormal Involuntary Movements: tremor noted  Gait and Station: not observed    PSYCHIATRIC MENTAL STATUS EXAM   Level of Consciousness: awake and alert  Orientation: name, place, date, situation  Grooming: limited  Psychomotor Behavior: restless  Speech: normal rate, normal volume  Language: no abnormalities  Mood: "good"  Affect: anxious  Thought Process: linear  Associations: intact  Thought Content: denies suicidal/homicidal/psychosis  Memory: intact to recent and remote  Attention: somewhat distracted  Fund of Knowledge: simple to conversation  Insight: poor into alcohol use  Judgment: limited into wanting sobriety          Finger to nose ataxia with broadening tremor at intention  Hand flap asterixis noted            Significant Labs:   Last 24 Hours:   Recent Lab Results       12/11/20  1218   12/11/20  0835   12/11/20  0150   12/11/20  0145   12/11/20  0120        Benzodiazepines       Negative       Methadone metabolites       Negative       Phencyclidine       Negative       Acetaminophen (Tylenol), Serum         <3.0  Comment:  Toxic Levels:  Adults (4 hr post-ingestion).........>150 ug/mL  Adults (12 hr post-ingestion)........>40 ug/mL  Peds " (2 hr post-ingestion, liquid)...>225 ug/mL       Albumin 3.1 3.0     3.6     Alcohol, Serum         >450  Comment:  Alcohol    critical result(s) called and verbal readback obtained   from   Loan Jackson by [facility] 12/11/2020 02:27       Alkaline Phosphatase   112     145     ALT   67     75     Amphetamine Screen, Ur       Negative       Anion Gap 10 8     13     Appearance, UA       Clear       AST   281     326     Barbiturate Screen, Ur       Negative       Baso #   0.04     0.08     Basophil %   0.8     0.9     Bilirubin (UA)       Negative       BILIRUBIN TOTAL   0.9  Comment:  For infants and newborns, interpretation of results should be based  on gestational age, weight and in agreement with clinical  observations.  Premature Infant recommended reference ranges:  Up to 24 hours.............<8.0 mg/dL  Up to 48 hours............<12.0 mg/dL  3-5 days..................<15.0 mg/dL  6-29 days.................<15.0 mg/dL       0.8  Comment:  For infants and newborns, interpretation of results should be based  on gestational age, weight and in agreement with clinical  observations.  Premature Infant recommended reference ranges:  Up to 24 hours.............<8.0 mg/dL  Up to 48 hours............<12.0 mg/dL  3-5 days..................<15.0 mg/dL  6-29 days.................<15.0 mg/dL       BUN 2 2     2     Calcium 7.3 7.1     8.6     Chloride 104 107     98     CO2 27 29     31     Cocaine (Metab.)       Negative       Color, UA       Straw       Creatinine 0.5 0.5     0.6     Creatinine, Urine       7.9  Comment:  The random urine reference ranges provided were established   for 24 hour urine collections.  No reference ranges exist for  random urine specimens.  Correlate clinically.         Differential Method   Automated     Automated     eGFR if  >60 >60     >60     eGFR if non  >60  Comment:  Calculation used to obtain the estimated glomerular filtration  rate (eGFR) is the CKD-EPI  equation.    >60  Comment:  Calculation used to obtain the estimated glomerular filtration  rate (eGFR) is the CKD-EPI equation.        >60  Comment:  Calculation used to obtain the estimated glomerular filtration  rate (eGFR) is the CKD-EPI equation.        Eos #   0.1     0.1     Eosinophil %   1.6     1.3     Glucose 90 86     106     Glucose, UA       Negative       Gran # (ANC)   2.3     3.9     Gran %   46.2     45.6     Hematocrit   33.1     37.7     Hemoglobin   11.6     13.1     Immature Grans (Abs)   0.01  Comment:  Mild elevation in immature granulocytes is non specific and   can be seen in a variety of conditions including stress response,   acute inflammation, trauma and pregnancy. Correlation with other   laboratory and clinical findings is essential.       0.03  Comment:  Mild elevation in immature granulocytes is non specific and   can be seen in a variety of conditions including stress response,   acute inflammation, trauma and pregnancy. Correlation with other   laboratory and clinical findings is essential.       Immature Granulocytes   0.2     0.3     Ketones, UA       Negative       Leukocytes, UA       Negative       Lymph #   2.1     3.7     Lymph %   42.3     42.8     Magnesium   1.5     1.6     MCH   34.7     34.7     MCHC   35.0     34.7     MCV   99     100     Mono #   0.4     0.8     Mono %   8.9     9.1     MPV   10.0     10.0     NITRITE UA       Negative       nRBC   0     0     Occult Blood UA       Negative       Opiate Scrn, Ur       Negative       pH, UA       8.0       Phosphorus 2.1 2.2           Platelets   123     140     Potassium 3.3 2.6  Comment:  Potassium critical result(s) called and verbal readback obtained from   Areli Lora by [facility] 12/11/2020 09:36       2.2  Comment:  Potassium    critical result(s) called and verbal readback obtained   from Marlen Saldaña by [facility] 12/11/2020 03:42       PROTEIN TOTAL   6.6     7.9     Protein, UA       Negative  Comment:  Recommend a 24  hour urine protein or a urine   protein/creatinine ratio if globulin induced proteinuria is  clinically suspected.          Acceptable     Yes         RBC   3.34     3.78     RDW   13.2     12.8     Salicylate Lvl         <5.0  Comment:  Toxic:  30.0 - 70.0 mg/dl  Lethal: >70.0 mg/dl       SARS-CoV-2 RNA, Amplification, Qual     Negative         Sodium 141 144     142     Specific [place], UA       1.000       Specimen UA       Urine, Clean Catch       Marijuana (THC) Metabolite       Presumptive Positive       Toxicology Information       SEE COMMENT  Comment:  This screen includes the following classes of drugs at the   listed cut-off:  Benzodiazepines                  200 ng/ml  Methadone                        300 ng/ml  Cocaine metabolite               300 ng/ml  Opiates                          300 ng/ml  Barbiturates                     200 ng/ml  Amphetamines                    1000 ng/ml  Marijuana metabs (THC)            50 ng/ml  Phencyclidine (PCP)               25 ng/ml  High concentrations of Diphenhydramine may cross-react with  Phencyclidine PCP screening immunoassay giving a false   positive result.  High concentrations of Methylenedioxymethamphetamine (MDMA aka  Ectasy) and other structurally similar compounds may cross-   react with the Amphetamine/Methamphetamine screening   immunoassay giving a false positive result.  A metabolite of the anti-HIV drug Sustiva () may cause  false positive results in the Marijuana metabolite (THC)   screening assay.  Note: This exception list includes only more common   interferants in toxicology screen testing.  Because of many   cross-reactantspositive results on toxicology drug screens   should be confirmed whenever results do not correlate with   clinical presentation.  This report is intended for use in clinical monitoring and  management of patients. It is not intended for use in   employment related drug testing.  Because of any  cross-reactants, positive results on toxicology  drug screens should be confirmed whenever results do not  correlate with clinical presentation.  Presumptive positive results are unconfirmed and may be used   only for medical purposes.  Assay Intended Use: This assay provides only a preliminary analytical  test result. A more specific alternate chemical method must be used  to obtain a confirmed analytical result. Gas chromatography/mass  spectrometry (GS/MS)is the preferred confirmatory method. Clinical  consideration and professional judgement should be applied to any   drug of abuse test result, particularly when preliminary results  are used.         TSH         2.143     UROBILINOGEN UA       Negative       WBC   4.94     8.58                        All pertinent labs within the past 24 hours have been reviewed.    Significant Imaging: I have reviewed all pertinent imaging results/findings within the past 24 hours.    Assessment/Plan:     Alcohol-induced anxiety disorder  No evidence of primary major depression, bipolar, schizophrenia, anxiety disorder.  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  Minimizes symptoms and doesn't need psychiatric medications at this time.  Symptoms should improve with sobriety so focus should be on inpatient rehab.  No need for acute inpatient psychiatric hospitalization at this time.    Alcohol use disorder, severe, dependence  Patient with a long chronic history of alcohol use disorder with failed rehab and sobriety.  Current physical exam and labs indicate advanced disease process.  Needs sobriety for best outcome.  Attempted to  and educate but patient not wanting rehab at this time.  Have social work provide patient with local resources for continued sobriety.  Needs inpatient rehab.  Continue to monitor and treat withdrawal with MVI/thiamine/folic acid replacement.  Poor prognosis.  She also complains of something in her ear which may/may not be withdrawal  related.  Physical exam should be done for further workup.         Total Time:  60 minutes      Cheo Snider MD   Psychiatry  [facility]

## 2020-12-11 NOTE — PLAN OF CARE
Discharge Planning Assessment completed and home needs addressed with patient at bedside. Patient confirmed information on Face Sheet as correct.     SW Role Explained.  Patient identified by using 3 identifiers: name, date of birth and home address.      Patient's Disposition: Lives w/ roommates     Patient confirmed that HELP AT HOME will be from: Friends as needed      SW placed name and contact number on Communicate Board. Pt informed to contact SW for all discharge needs/questions.      PCP: Will refer to [facility] at d/c     [id number] - [place], [place] - [address] AT [facility] [address]  [address]  [place] [address]  Phone: [phone] Fax: [phone]    Extended Emergency Contact Information  Primary Emergency Contact: Wenceslao Cohn  Address: [address] [address] of [facility]  Home Phone: [phone]  Relation: Friend       12/11/20 1252   Discharge Assessment   Assessment Type Discharge Planning Assessment   Confirmed/corrected address and phone number on facesheet? Yes   Assessment information obtained from? Patient   Expected Length of Stay (days) 3   Communicated expected length of stay with patient/caregiver yes   Prior to hospitilization cognitive status: Alert/Oriented   Prior to hospitalization functional status: Independent   Current cognitive status: Alert/Oriented   Current Functional Status: Independent   Facility Arrived From: Home   Lives With other (see comments)  (Roommates)   Able to Return to Prior Arrangements yes   Is patient able to care for self after discharge? Yes   Who are your caregiver(s) and their phone number(s)? Friend: Wenceslao [phone]   Patient's perception of discharge disposition home or selfcare   Readmission Within the Last 30 Days no previous admission in last 30 days   Patient currently being followed by outpatient case management? No   Patient currently receives  any other outside agency services? No   Equipment Currently Used at Home none   Do you have any problems affording any of your prescribed medications? No   Is the patient taking medications as prescribed? yes   Does the patient have transportation home? Yes   Transportation Anticipated family or friend will provide   Dialysis Name and Scheduled days N/A   Does the patient receive services at the [facility]mad[facility] Clinic? No   Discharge Plan A Home   DME Needed Upon Discharge  other (see comments)  (Pending)   Patient/Family in Agreement with Plan yes

## 2020-12-11 NOTE — NURSING
Critical potassium called over by lab. Dr. Egan notified also is aware of blood pressure. New orders in place. Will continue to monitor    1139a patient blood pressure is better 134/80. Will continue to monitor

## 2020-12-11 NOTE — ASSESSMENT & PLAN NOTE
Patient with a long chronic history of alcohol use disorder with failed rehab and sobriety.  Current physical exam and labs indicate advanced disease process.  Needs sobriety for best outcome.  Attempted to  and educate but patient not wanting rehab at this time.  Have social work provide patient with local resources for continued sobriety.  Needs inpatient rehab.  Continue to monitor and treat withdrawal with MVI/thiamine/folic acid replacement.  Poor prognosis.  She also complains of something in her ear which may/may not be withdrawal related.  Physical exam should be done for further workup.

## 2020-12-12 PROBLEM — K70.10 ALCOHOLIC HEPATITIS: Status: ACTIVE | Noted: 2020-12-12

## 2020-12-12 PROBLEM — R74.01 TRANSAMINITIS: Status: RESOLVED | Noted: 2020-12-11 | Resolved: 2020-12-12

## 2020-12-12 LAB
ALBUMIN SERPL BCP-MCNC: 3 G/DL (ref 3.5–5.2)
ALP SERPL-CCNC: 108 U/L (ref 55–135)
ALT SERPL W/O P-5'-P-CCNC: 57 U/L (ref 10–44)
ANION GAP SERPL CALC-SCNC: 11 MMOL/L (ref 8–16)
AST SERPL-CCNC: 203 U/L (ref 10–40)
BASOPHILS # BLD AUTO: 0.02 K/UL (ref 0–0.2)
BASOPHILS NFR BLD: 0.5 % (ref 0–1.9)
BILIRUB SERPL-MCNC: 2.4 MG/DL (ref 0.1–1)
BUN SERPL-MCNC: <2 MG/DL (ref 6–20)
CALCIUM SERPL-MCNC: 7 MG/DL (ref 8.7–10.5)
CHLORIDE SERPL-SCNC: 100 MMOL/L (ref 95–110)
CO2 SERPL-SCNC: 26 MMOL/L (ref 23–29)
CREAT SERPL-MCNC: 0.5 MG/DL (ref 0.5–1.4)
DIFFERENTIAL METHOD: ABNORMAL
EOSINOPHIL # BLD AUTO: 0.1 K/UL (ref 0–0.5)
EOSINOPHIL NFR BLD: 2 % (ref 0–8)
ERYTHROCYTE [DISTWIDTH] IN BLOOD BY AUTOMATED COUNT: 12.9 % (ref 11.5–14.5)
EST. GFR  (AFRICAN AMERICAN): >60 ML/MIN/1.73 M^2
EST. GFR  (NON AFRICAN AMERICAN): >60 ML/MIN/1.73 M^2
GLUCOSE SERPL-MCNC: 97 MG/DL (ref 70–110)
HCT VFR BLD AUTO: 32.6 % (ref 37–48.5)
HGB BLD-MCNC: 11.3 G/DL (ref 12–16)
IMM GRANULOCYTES # BLD AUTO: 0.01 K/UL (ref 0–0.04)
IMM GRANULOCYTES NFR BLD AUTO: 0.2 % (ref 0–0.5)
INR PPP: 1.1 (ref 0.8–1.2)
LYMPHOCYTES # BLD AUTO: 1.4 K/UL (ref 1–4.8)
LYMPHOCYTES NFR BLD: 34 % (ref 18–48)
MAGNESIUM SERPL-MCNC: 1.1 MG/DL (ref 1.6–2.6)
MCH RBC QN AUTO: 34.7 PG (ref 27–31)
MCHC RBC AUTO-ENTMCNC: 34.7 G/DL (ref 32–36)
MCV RBC AUTO: 100 FL (ref 82–98)
MONOCYTES # BLD AUTO: 0.4 K/UL (ref 0.3–1)
MONOCYTES NFR BLD: 8.9 % (ref 4–15)
NEUTROPHILS # BLD AUTO: 2.2 K/UL (ref 1.8–7.7)
NEUTROPHILS NFR BLD: 54.4 % (ref 38–73)
NRBC BLD-RTO: 0 /100 WBC
PHOSPHATE SERPL-MCNC: 2.1 MG/DL (ref 2.7–4.5)
PLATELET # BLD AUTO: 102 K/UL (ref 150–350)
PMV BLD AUTO: 10.5 FL (ref 9.2–12.9)
POTASSIUM SERPL-SCNC: 3.2 MMOL/L (ref 3.5–5.1)
PROT SERPL-MCNC: 6.7 G/DL (ref 6–8.4)
PROTHROMBIN TIME: 12 SEC (ref 9–12.5)
RBC # BLD AUTO: 3.26 M/UL (ref 4–5.4)
SODIUM SERPL-SCNC: 137 MMOL/L (ref 136–145)
WBC # BLD AUTO: 4.06 K/UL (ref 3.9–12.7)

## 2020-12-12 PROCEDURE — 25000003 PHARM REV CODE 250: Performed by: INTERNAL MEDICINE

## 2020-12-12 PROCEDURE — 80074 ACUTE HEPATITIS PANEL: CPT

## 2020-12-12 PROCEDURE — 94761 N-INVAS EAR/PLS OXIMETRY MLT: CPT

## 2020-12-12 PROCEDURE — 83735 ASSAY OF MAGNESIUM: CPT

## 2020-12-12 PROCEDURE — 63600175 PHARM REV CODE 636 W HCPCS: Performed by: INTERNAL MEDICINE

## 2020-12-12 PROCEDURE — 21400001 HC TELEMETRY ROOM

## 2020-12-12 PROCEDURE — 84100 ASSAY OF PHOSPHORUS: CPT

## 2020-12-12 PROCEDURE — 85610 PROTHROMBIN TIME: CPT

## 2020-12-12 PROCEDURE — 85025 COMPLETE CBC W/AUTO DIFF WBC: CPT

## 2020-12-12 PROCEDURE — 25000003 PHARM REV CODE 250: Performed by: EMERGENCY MEDICINE

## 2020-12-12 PROCEDURE — 36415 COLL VENOUS BLD VENIPUNCTURE: CPT

## 2020-12-12 PROCEDURE — 25000003 PHARM REV CODE 250: Performed by: PHYSICIAN ASSISTANT

## 2020-12-12 PROCEDURE — 80053 COMPREHEN METABOLIC PANEL: CPT

## 2020-12-12 PROCEDURE — 99900035 HC TECH TIME PER 15 MIN (STAT)

## 2020-12-12 RX ORDER — POTASSIUM CHLORIDE 20 MEQ/1
40 TABLET, EXTENDED RELEASE ORAL ONCE
Status: COMPLETED | OUTPATIENT
Start: 2020-12-12 | End: 2020-12-12

## 2020-12-12 RX ORDER — MAGNESIUM SULFATE HEPTAHYDRATE 40 MG/ML
2 INJECTION, SOLUTION INTRAVENOUS ONCE
Status: COMPLETED | OUTPATIENT
Start: 2020-12-12 | End: 2020-12-12

## 2020-12-12 RX ORDER — DIAZEPAM 5 MG/1
5 TABLET ORAL EVERY 12 HOURS
Status: DISCONTINUED | OUTPATIENT
Start: 2020-12-12 | End: 2020-12-15

## 2020-12-12 RX ORDER — ENOXAPARIN SODIUM 100 MG/ML
40 INJECTION SUBCUTANEOUS EVERY 24 HOURS
Status: DISCONTINUED | OUTPATIENT
Start: 2020-12-12 | End: 2020-12-16 | Stop reason: HOSPADM

## 2020-12-12 RX ADMIN — ONDANSETRON HYDROCHLORIDE 4 MG: 4 TABLET, FILM COATED ORAL at 05:12

## 2020-12-12 RX ADMIN — THIAMINE HCL TAB 100 MG 100 MG: 100 TAB at 08:12

## 2020-12-12 RX ADMIN — FOLIC ACID 1 MG: 1 TABLET ORAL at 08:12

## 2020-12-12 RX ADMIN — DEXTROSE MONOHYDRATE, SODIUM CHLORIDE, AND POTASSIUM CHLORIDE 100 ML/HR: 50; 9; 1.49 INJECTION, SOLUTION INTRAVENOUS at 08:12

## 2020-12-12 RX ADMIN — DIAZEPAM 5 MG: 5 TABLET ORAL at 08:12

## 2020-12-12 RX ADMIN — MAGNESIUM SULFATE 2 G: 2 INJECTION INTRAVENOUS at 10:12

## 2020-12-12 RX ADMIN — ONDANSETRON HYDROCHLORIDE 4 MG: 4 TABLET, FILM COATED ORAL at 12:12

## 2020-12-12 RX ADMIN — ENOXAPARIN SODIUM 40 MG: 40 INJECTION SUBCUTANEOUS at 04:12

## 2020-12-12 RX ADMIN — THERA TABS 1 TABLET: TAB at 08:12

## 2020-12-12 RX ADMIN — ACETAMINOPHEN 500 MG: 500 TABLET ORAL at 06:12

## 2020-12-12 RX ADMIN — ONDANSETRON HYDROCHLORIDE 4 MG: 4 TABLET, FILM COATED ORAL at 06:12

## 2020-12-12 RX ADMIN — POTASSIUM CHLORIDE 40 MEQ: 1500 TABLET, EXTENDED RELEASE ORAL at 10:12

## 2020-12-12 RX ADMIN — CITALOPRAM HYDROBROMIDE 20 MG: 20 TABLET ORAL at 08:12

## 2020-12-12 NOTE — SUBJECTIVE & OBJECTIVE
Interval History: Feeling better than yesterday - a bit drowsy. +Right ear fullness. States it has been around for around 8 months. No pain when pulling pinna. No discharge, fevers, chills.    Review of Systems   Constitutional: Positive for activity change and fatigue. Negative for chills and fever.   Respiratory: Negative for cough and shortness of breath.    Cardiovascular: Negative for chest pain and palpitations.   Gastrointestinal: Negative for abdominal distention and abdominal pain.   Neurological: Negative for seizures and syncope.     Objective:     Vital Signs (Most Recent):  Temp: 96.5 °F (35.8 °C) (12/12/20 1135)  Pulse: 70 (12/12/20 1135)  Resp: 18 (12/12/20 1135)  BP: 126/79 (12/12/20 1135)  SpO2: (!) 94 % (12/12/20 1135) Vital Signs (24h Range):  Temp:  [96.5 °F (35.8 °C)-98.6 °F (37 °C)] 96.5 °F (35.8 °C)  Pulse:  [60-87] 70  Resp:  [17-18] 18  SpO2:  [94 %-98 %] 94 %  BP: (126-140)/(74-82) 126/79     Weight: 59.4 kg (130 lb 15.3 oz)  Body mass index is 23.95 kg/m².    Intake/Output Summary (Last 24 hours) at 12/12/2020 1410  Last data filed at 12/11/2020 1904  Gross per 24 hour   Intake 1023.33 ml   Output 250 ml   Net 773.33 ml      Physical Exam  Constitutional:       Appearance: Normal appearance.   Eyes:      Extraocular Movements: Extraocular movements intact.      Conjunctiva/sclera: Conjunctivae normal.   Neck:      Musculoskeletal: Normal range of motion and neck supple.   Cardiovascular:      Rate and Rhythm: Normal rate and regular rhythm.      Pulses: Normal pulses.      Heart sounds: Normal heart sounds.   Pulmonary:      Effort: Pulmonary effort is normal.      Breath sounds: Normal breath sounds.   Abdominal:      General: Abdomen is flat. There is no distension.      Tenderness: There is no abdominal tenderness. There is no guarding.   Skin:     General: Skin is warm and dry.      Coloration: Skin is not jaundiced.   Neurological:      Mental Status: She is alert.      Comments: No  asterixis  +Slight tremor with outstretched hands   Psychiatric:         Mood and Affect: Mood normal.         Behavior: Behavior normal.         Significant Labs: All pertinent labs within the past 24 hours have been reviewed.    Significant Imaging: I have reviewed all pertinent imaging results/findings within the past 24 hours.

## 2020-12-12 NOTE — NURSING
Patient awake, alert, oriented resting comfortably. No signs of distress observed. bed low and locked. Call light in reach. Report given to oncoming nurse, TORSTEN Hanson. 12hour chart check complete.

## 2020-12-12 NOTE — ASSESSMENT & PLAN NOTE
Hx bipolar, schizophrenia, anxiety/depression. Presented with dysphoric mood with crying and expressing sadness, but denied SI or hallucinations.   Psych following - appreciate input  Patient will benefit from sobriety  She is in a better mood today and seems motivated to quit drinking  SW consult for EtOH rehab resources

## 2020-12-12 NOTE — PROGRESS NOTES
[facility]  Progress Note    Patient Name: Carmen Landon  MRN: [id number]  Patient Class: IP- Inpatient   Admission Date: 12/11/2020  Length of Stay: 1 days  Attending Physician: Dmitry Egan DO  Primary Care Provider: LIZETH Holcomb        Subjective:     Principal Problem:Hypokalemia        HPI:   Carmen Landon is 46 y/o female with anxiety, depression, bipolar, and schizophrenia who presents to the hospital with complaining of anxiousness, bilateral lip numbness, and dysphoric mood. Per ED note, patient was tearful and stated she was sad but would not give any further details with EMS. Upon ED arrival, patient stated that she was here due to right ear pain. She endorsed large amount of alcohol usage last night. Denied any other symptoms including any hallucinations or intent to harm herself or others. During my exam, patient is very lethargic after given Ativan in ED. She is aroused briefly with tactile stimuli but falls right back to sleep. History is limited due to patient's mental status. Of note, patient was seen here on 10/8 of this year with labs indicative of alcohol abuse. Of note, the patient has been seen in the ED twice, on 6/29/17 and 7/2/14, also for alcohol intoxication. Recently seen here 3 weeks ago on 11/24/2020 for back pain.    In ED, COVID-19 negative. Labs remarkable with K+ 2.2, elevated AST//75. Drug screen with THC positive. ETOH > 450.          Overview/Hospital Course:  No notes on file    Interval History: Feeling better than yesterday - a bit drowsy. +Right ear fullness. States it has been around for around 8 months. No pain when pulling pinna. No discharge, fevers, chills.    Review of Systems   Constitutional: Positive for activity change and fatigue. Negative for chills and fever.   Respiratory: Negative for cough and shortness of breath.    Cardiovascular: Negative for chest pain and palpitations.    Gastrointestinal: Negative for abdominal distention and abdominal pain.   Neurological: Negative for seizures and syncope.     Objective:     Vital Signs (Most Recent):  Temp: 96.5 °F (35.8 °C) (12/12/20 1135)  Pulse: 70 (12/12/20 1135)  Resp: 18 (12/12/20 1135)  BP: 126/79 (12/12/20 1135)  SpO2: (!) 94 % (12/12/20 1135) Vital Signs (24h Range):  Temp:  [96.5 °F (35.8 °C)-98.6 °F (37 °C)] 96.5 °F (35.8 °C)  Pulse:  [60-87] 70  Resp:  [17-18] 18  SpO2:  [94 %-98 %] 94 %  BP: (126-140)/(74-82) 126/79     Weight: 59.4 kg (130 lb 15.3 oz)  Body mass index is 23.95 kg/m².    Intake/Output Summary (Last 24 hours) at 12/12/2020 1410  Last data filed at 12/11/2020 1904  Gross per 24 hour   Intake 1023.33 ml   Output 250 ml   Net 773.33 ml      Physical Exam  Constitutional:       Appearance: Normal appearance.   Eyes:      Extraocular Movements: Extraocular movements intact.      Conjunctiva/sclera: Conjunctivae normal.   Neck:      Musculoskeletal: Normal range of motion and neck supple.   Cardiovascular:      Rate and Rhythm: Normal rate and regular rhythm.      Pulses: Normal pulses.      Heart sounds: Normal heart sounds.   Pulmonary:      Effort: Pulmonary effort is normal.      Breath sounds: Normal breath sounds.   Abdominal:      General: Abdomen is flat. There is no distension.      Tenderness: There is no abdominal tenderness. There is no guarding.   Skin:     General: Skin is warm and dry.      Coloration: Skin is not jaundiced.   Neurological:      Mental Status: She is alert.      Comments: No asterixis  +Slight tremor with outstretched hands   Psychiatric:         Mood and Affect: Mood normal.         Behavior: Behavior normal.         Significant Labs: All pertinent labs within the past 24 hours have been reviewed.    Significant Imaging: I have reviewed all pertinent imaging results/findings within the past 24 hours.      Assessment/Plan:      * Hypokalemia  Nutritional  Replete Mag and K PRN    Alcoholic  hepatitis  Check INR to calculate DF  Bili inc to 2.4 from 0.9  No asterixis  No encephalopathy  No ascites on exam  Check liver US with doppler for PVT, ascites, signs of cirrhosis   Multivitamin, thiamine, folate    Alcohol-induced anxiety disorder  Hx bipolar, schizophrenia, anxiety/depression. Presented with dysphoric mood with crying and expressing sadness, but denied SI or hallucinations.   Psych following - appreciate input  Patient will benefit from sobriety  She is in a better mood today and seems motivated to quit drinking  SW consult for EtOH rehab resources    Alcohol use disorder, severe, dependence  Last drink 12/10  +Tremors  Cont diazepam 5 mg TID + lorazepam 2 mg IV q 4 hours PRN      VTE Risk Mitigation (From admission, onward)         Ordered     enoxaparin injection 40 mg  Every 24 hours      12/12/20 1419     IP VTE LOW RISK PATIENT  Once      12/11/20 0554     Place sequential compression device  Until discontinued      12/11/20 0554     Place KEZIA hose  Until discontinued      12/11/20 0554                Discharge Planning   MARIA INES:      Code Status: Full Code   Is the patient medically ready for discharge?:     Reason for patient still in hospital (select all that apply): Patient unstable  Discharge Plan A: Home                  Dmitry Egan DO  Department of Hospital Medicine   [facility]

## 2020-12-12 NOTE — PLAN OF CARE
DX:Hypokalemia    Shift Events: V/S q 4hrs, neuro checks q 4hrs ciwa q shift      Plan of Care:monitor neuro, ciwa and seizure/tremor activity     Neuro: A/O x 4     Respiratory: R/A     Cardiac:no tele     Diet:  clear liquids     Gtts: D5 N/S with 20 kcl     Skin:intact

## 2020-12-12 NOTE — ASSESSMENT & PLAN NOTE
Check INR to calculate DF  Bili inc to 2.4 from 0.9  No asterixis  No encephalopathy  No ascites on exam  Check liver US with doppler for PVT, ascites, signs of cirrhosis   Multivitamin, thiamine, folate

## 2020-12-13 PROBLEM — E87.8 REFEEDING SYNDROME: Status: ACTIVE | Noted: 2020-12-13

## 2020-12-13 PROBLEM — F10.939 ALCOHOL WITHDRAWAL: Status: ACTIVE | Noted: 2020-12-11

## 2020-12-13 PROBLEM — E87.6 HYPOKALEMIA: Status: RESOLVED | Noted: 2020-12-11 | Resolved: 2020-12-13

## 2020-12-13 LAB
ALBUMIN SERPL BCP-MCNC: 3.3 G/DL (ref 3.5–5.2)
ALBUMIN SERPL BCP-MCNC: 3.3 G/DL (ref 3.5–5.2)
ALP SERPL-CCNC: 110 U/L (ref 55–135)
ALP SERPL-CCNC: 117 U/L (ref 55–135)
ALT SERPL W/O P-5'-P-CCNC: 52 U/L (ref 10–44)
ALT SERPL W/O P-5'-P-CCNC: 57 U/L (ref 10–44)
ANION GAP SERPL CALC-SCNC: 10 MMOL/L (ref 8–16)
ANION GAP SERPL CALC-SCNC: 9 MMOL/L (ref 8–16)
AST SERPL-CCNC: 147 U/L (ref 10–40)
AST SERPL-CCNC: 179 U/L (ref 10–40)
BASOPHILS # BLD AUTO: 0.04 K/UL (ref 0–0.2)
BASOPHILS NFR BLD: 1 % (ref 0–1.9)
BILIRUB SERPL-MCNC: 1.6 MG/DL (ref 0.1–1)
BILIRUB SERPL-MCNC: 2.4 MG/DL (ref 0.1–1)
BUN SERPL-MCNC: <2 MG/DL (ref 6–20)
BUN SERPL-MCNC: <2 MG/DL (ref 6–20)
CALCIUM SERPL-MCNC: 7.2 MG/DL (ref 8.7–10.5)
CALCIUM SERPL-MCNC: 7.4 MG/DL (ref 8.7–10.5)
CHLORIDE SERPL-SCNC: 104 MMOL/L (ref 95–110)
CHLORIDE SERPL-SCNC: 105 MMOL/L (ref 95–110)
CO2 SERPL-SCNC: 21 MMOL/L (ref 23–29)
CO2 SERPL-SCNC: 23 MMOL/L (ref 23–29)
CREAT SERPL-MCNC: 0.5 MG/DL (ref 0.5–1.4)
CREAT SERPL-MCNC: 0.6 MG/DL (ref 0.5–1.4)
DIFFERENTIAL METHOD: ABNORMAL
EOSINOPHIL # BLD AUTO: 0.1 K/UL (ref 0–0.5)
EOSINOPHIL NFR BLD: 1.9 % (ref 0–8)
ERYTHROCYTE [DISTWIDTH] IN BLOOD BY AUTOMATED COUNT: 12.8 % (ref 11.5–14.5)
EST. GFR  (AFRICAN AMERICAN): >60 ML/MIN/1.73 M^2
EST. GFR  (AFRICAN AMERICAN): >60 ML/MIN/1.73 M^2
EST. GFR  (NON AFRICAN AMERICAN): >60 ML/MIN/1.73 M^2
EST. GFR  (NON AFRICAN AMERICAN): >60 ML/MIN/1.73 M^2
GLUCOSE SERPL-MCNC: 130 MG/DL (ref 70–110)
GLUCOSE SERPL-MCNC: 98 MG/DL (ref 70–110)
HCT VFR BLD AUTO: 35.3 % (ref 37–48.5)
HGB BLD-MCNC: 11.9 G/DL (ref 12–16)
IMM GRANULOCYTES # BLD AUTO: 0.01 K/UL (ref 0–0.04)
IMM GRANULOCYTES NFR BLD AUTO: 0.2 % (ref 0–0.5)
INR PPP: 1.1 (ref 0.8–1.2)
LYMPHOCYTES # BLD AUTO: 1.2 K/UL (ref 1–4.8)
LYMPHOCYTES NFR BLD: 30.2 % (ref 18–48)
MAGNESIUM SERPL-MCNC: 1.5 MG/DL (ref 1.6–2.6)
MAGNESIUM SERPL-MCNC: 2 MG/DL (ref 1.6–2.6)
MCH RBC QN AUTO: 35.1 PG (ref 27–31)
MCHC RBC AUTO-ENTMCNC: 33.7 G/DL (ref 32–36)
MCV RBC AUTO: 104 FL (ref 82–98)
MONOCYTES # BLD AUTO: 0.3 K/UL (ref 0.3–1)
MONOCYTES NFR BLD: 7.1 % (ref 4–15)
NEUTROPHILS # BLD AUTO: 2.5 K/UL (ref 1.8–7.7)
NEUTROPHILS NFR BLD: 59.6 % (ref 38–73)
NRBC BLD-RTO: 0 /100 WBC
PHOSPHATE SERPL-MCNC: 1.6 MG/DL (ref 2.7–4.5)
PHOSPHATE SERPL-MCNC: 3.9 MG/DL (ref 2.7–4.5)
PLATELET # BLD AUTO: 100 K/UL (ref 150–350)
PMV BLD AUTO: 10.9 FL (ref 9.2–12.9)
POTASSIUM SERPL-SCNC: 3.6 MMOL/L (ref 3.5–5.1)
POTASSIUM SERPL-SCNC: 3.9 MMOL/L (ref 3.5–5.1)
PROT SERPL-MCNC: 7.2 G/DL (ref 6–8.4)
PROT SERPL-MCNC: 7.4 G/DL (ref 6–8.4)
PROTHROMBIN TIME: 12.5 SEC (ref 9–12.5)
RBC # BLD AUTO: 3.39 M/UL (ref 4–5.4)
SODIUM SERPL-SCNC: 136 MMOL/L (ref 136–145)
SODIUM SERPL-SCNC: 136 MMOL/L (ref 136–145)
WBC # BLD AUTO: 4.11 K/UL (ref 3.9–12.7)

## 2020-12-13 PROCEDURE — 25000003 PHARM REV CODE 250: Performed by: PHYSICIAN ASSISTANT

## 2020-12-13 PROCEDURE — 84100 ASSAY OF PHOSPHORUS: CPT | Mod: 91

## 2020-12-13 PROCEDURE — 80053 COMPREHEN METABOLIC PANEL: CPT

## 2020-12-13 PROCEDURE — 83735 ASSAY OF MAGNESIUM: CPT | Mod: 91

## 2020-12-13 PROCEDURE — 85610 PROTHROMBIN TIME: CPT

## 2020-12-13 PROCEDURE — 80053 COMPREHEN METABOLIC PANEL: CPT | Mod: 91

## 2020-12-13 PROCEDURE — 36415 COLL VENOUS BLD VENIPUNCTURE: CPT

## 2020-12-13 PROCEDURE — 25000003 PHARM REV CODE 250: Performed by: EMERGENCY MEDICINE

## 2020-12-13 PROCEDURE — 85025 COMPLETE CBC W/AUTO DIFF WBC: CPT

## 2020-12-13 PROCEDURE — 84100 ASSAY OF PHOSPHORUS: CPT

## 2020-12-13 PROCEDURE — 21400001 HC TELEMETRY ROOM

## 2020-12-13 PROCEDURE — 25000003 PHARM REV CODE 250: Performed by: INTERNAL MEDICINE

## 2020-12-13 PROCEDURE — 63600175 PHARM REV CODE 636 W HCPCS: Performed by: INTERNAL MEDICINE

## 2020-12-13 PROCEDURE — 83735 ASSAY OF MAGNESIUM: CPT

## 2020-12-13 PROCEDURE — 94761 N-INVAS EAR/PLS OXIMETRY MLT: CPT

## 2020-12-13 RX ORDER — MAGNESIUM SULFATE HEPTAHYDRATE 40 MG/ML
2 INJECTION, SOLUTION INTRAVENOUS ONCE
Status: COMPLETED | OUTPATIENT
Start: 2020-12-13 | End: 2020-12-13

## 2020-12-13 RX ADMIN — ONDANSETRON HYDROCHLORIDE 4 MG: 4 TABLET, FILM COATED ORAL at 05:12

## 2020-12-13 RX ADMIN — DIAZEPAM 5 MG: 5 TABLET ORAL at 09:12

## 2020-12-13 RX ADMIN — CITALOPRAM HYDROBROMIDE 20 MG: 20 TABLET ORAL at 09:12

## 2020-12-13 RX ADMIN — THIAMINE HCL TAB 100 MG 100 MG: 100 TAB at 09:12

## 2020-12-13 RX ADMIN — DEXTROSE MONOHYDRATE, SODIUM CHLORIDE, AND POTASSIUM CHLORIDE 100 ML/HR: 50; 9; 1.49 INJECTION, SOLUTION INTRAVENOUS at 04:12

## 2020-12-13 RX ADMIN — SODIUM PHOSPHATE, MONOBASIC, MONOHYDRATE 30 MMOL: 276; 142 INJECTION, SOLUTION INTRAVENOUS at 12:12

## 2020-12-13 RX ADMIN — THERA TABS 1 TABLET: TAB at 09:12

## 2020-12-13 RX ADMIN — DEXTROSE MONOHYDRATE, SODIUM CHLORIDE, AND POTASSIUM CHLORIDE 100 ML/HR: 50; 9; 1.49 INJECTION, SOLUTION INTRAVENOUS at 08:12

## 2020-12-13 RX ADMIN — DIAZEPAM 5 MG: 5 TABLET ORAL at 08:12

## 2020-12-13 RX ADMIN — ACETAMINOPHEN 500 MG: 500 TABLET ORAL at 04:12

## 2020-12-13 RX ADMIN — FOLIC ACID 1 MG: 1 TABLET ORAL at 09:12

## 2020-12-13 RX ADMIN — ONDANSETRON HYDROCHLORIDE 4 MG: 4 TABLET, FILM COATED ORAL at 12:12

## 2020-12-13 RX ADMIN — MAGNESIUM SULFATE 2 G: 2 INJECTION INTRAVENOUS at 09:12

## 2020-12-13 RX ADMIN — ACETAMINOPHEN 500 MG: 500 TABLET ORAL at 05:12

## 2020-12-13 RX ADMIN — ENOXAPARIN SODIUM 40 MG: 40 INJECTION SUBCUTANEOUS at 05:12

## 2020-12-13 NOTE — PLAN OF CARE
Problem: Fall Injury Risk  Goal: Absence of Fall and Fall-Related Injury  Outcome: Ongoing, Progressing  Intervention: Identify and Manage Contributors to Fall Injury Risk  Flowsheets (Taken 12/13/2020 4515)  Self-Care Promotion:   BADL personal objects within reach   BADL personal routines maintained   independence encouraged  Medication Review/Management:   medications reviewed   high risk medications identified

## 2020-12-13 NOTE — PROGRESS NOTES
[facility]  Progress Note    Patient Name: Carmen Landon  MRN: [id number]  Patient Class: IP- Inpatient   Admission Date: 12/11/2020  Length of Stay: 2 days  Attending Physician: Dmitry Egan DO  Primary Care Provider: LIZETH Holcomb        Subjective:     Principal Problem:Refeeding syndrome        HPI:   Carmen Landon is 44 y/o female with anxiety, depression, bipolar, and schizophrenia who presents to the hospital with complaining of anxiousness, bilateral lip numbness, and dysphoric mood. Per ED note, patient was tearful and stated she was sad but would not give any further details with EMS. Upon ED arrival, patient stated that she was here due to right ear pain. She endorsed large amount of alcohol usage last night. Denied any other symptoms including any hallucinations or intent to harm herself or others. During my exam, patient is very lethargic after given Ativan in ED. She is aroused briefly with tactile stimuli but falls right back to sleep. History is limited due to patient's mental status. Of note, patient was seen here on 10/8 of this year with labs indicative of alcohol abuse. Of note, the patient has been seen in the ED twice, on 6/29/17 and 7/2/14, also for alcohol intoxication. Recently seen here 3 weeks ago on 11/24/2020 for back pain.    In ED, COVID-19 negative. Labs remarkable with K+ 2.2, elevated AST//75. Drug screen with THC positive. ETOH > 450.          Overview/Hospital Course:  No notes on file    Interval History: Feeling a little anxious. No other complaints.    Review of Systems   Constitutional: Positive for activity change and fatigue. Negative for chills and fever.   Respiratory: Negative for cough and shortness of breath.    Cardiovascular: Negative for chest pain and palpitations.   Gastrointestinal: Negative for abdominal distention and abdominal pain.   Neurological: Negative for seizures and syncope.      Objective:     Vital Signs (Most Recent):  Temp: 98.6 °F (37 °C) (12/13/20 1152)  Pulse: 79 (12/13/20 1152)  Resp: 17 (12/13/20 1152)  BP: 127/66 (12/13/20 1152)  SpO2: 96 % (12/13/20 1152) Vital Signs (24h Range):  Temp:  [96.3 °F (35.7 °C)-98.9 °F (37.2 °C)] 98.6 °F (37 °C)  Pulse:  [69-79] 79  Resp:  [17-18] 17  SpO2:  [95 %-99 %] 96 %  BP: (120-144)/(66-77) 127/66     Weight: 57.6 kg (126 lb 15.8 oz)  Body mass index is 23.23 kg/m².  No intake or output data in the 24 hours ending 12/13/20 1227   Physical Exam  Constitutional:       Appearance: Normal appearance.   Eyes:      Extraocular Movements: Extraocular movements intact.      Conjunctiva/sclera: Conjunctivae normal.   Neck:      Musculoskeletal: Normal range of motion and neck supple.   Cardiovascular:      Rate and Rhythm: Normal rate and regular rhythm.      Pulses: Normal pulses.      Heart sounds: Normal heart sounds.   Pulmonary:      Effort: Pulmonary effort is normal.      Breath sounds: Normal breath sounds.   Abdominal:      General: Abdomen is flat. There is no distension.      Tenderness: There is no abdominal tenderness. There is no guarding.   Skin:     General: Skin is warm and dry.      Coloration: Skin is not jaundiced.   Neurological:      Mental Status: She is alert.      Comments: No asterixis  +Slight tremor with outstretched hands (stable from 12/12)   Psychiatric:         Mood and Affect: Mood normal.         Behavior: Behavior normal.         Significant Labs: All pertinent labs within the past 24 hours have been reviewed.    Significant Imaging: I have reviewed all pertinent imaging results/findings within the past 24 hours.      Assessment/Plan:      * Refeeding syndrome  Evidenced by hypokalemia, hypomagnesemia, hypophosphatemia  Pt with very poor nutritional status. Suspect mostly EtOH   Replete electrolytes PRN  Monitor CMP q8 hours  Consult nutrition    Alcoholic hepatitis  No sign of acute liver failure  INR 1.1. No  hepatic encephalopathy  No indication for steroid  Counseled extensively on EtOH cessation. She seems motivated.  Cont monitoring    Alcohol-induced anxiety disorder  Hx bipolar, schizophrenia, anxiety/depression. Presented with dysphoric mood with crying and expressing sadness, but denied SI or hallucinations.   Psych following - appreciate input  Patient will benefit from sobriety   consult for EtOH rehab resources    Alcohol withdrawal  Last drink 12/10  +Tremors  Cont diazepam 5 mg TID + lorazepam 2 mg q 4 hours PRN      VTE Risk Mitigation (From admission, onward)         Ordered     enoxaparin injection 40 mg  Every 24 hours      12/12/20 1419     IP VTE LOW RISK PATIENT  Once      12/11/20 0554     Place sequential compression device  Until discontinued      12/11/20 0554     Place KEZIA hose  Until discontinued      12/11/20 0554                Discharge Planning   MARIA INES:      Code Status: Full Code   Is the patient medically ready for discharge?:     Reason for patient still in hospital (select all that apply): Patient trending condition and Laboratory test  Discharge Plan A: Home                  Dmitry Egan DO  Department of Hospital Medicine   [facility]

## 2020-12-13 NOTE — ASSESSMENT & PLAN NOTE
No sign of acute liver failure  INR 1.1. No hepatic encephalopathy  No indication for steroid  Counseled extensively on EtOH cessation. She seems motivated.  Cont monitoring

## 2020-12-13 NOTE — ASSESSMENT & PLAN NOTE
Hx bipolar, schizophrenia, anxiety/depression. Presented with dysphoric mood with crying and expressing sadness, but denied SI or hallucinations.   Psych following - appreciate input  Patient will benefit from sobriety  SW consult for EtOH rehab resources

## 2020-12-13 NOTE — PLAN OF CARE
Problem: Fall Injury Risk  Goal: Absence of Fall and Fall-Related Injury  Outcome: Ongoing, Progressing  Intervention: Identify and Manage Contributors to Fall Injury Risk  Flowsheets (Taken 12/13/2020 0615)  Self-Care Promotion:   BADL personal objects within reach   BADL personal routines maintained   independence encouraged  Medication Review/Management:   medications reviewed   high risk medications identified     Problem: Skin Injury Risk Increased  Goal: Skin Health and Integrity  Outcome: Ongoing, Progressing  Intervention: Optimize Skin Protection  Flowsheets (Taken 12/13/2020 0628)  Pressure Reduction Devices: pressure-redistributing mattress utilized  Skin Protection:   adhesive use limited   electrode sites changed  Head of Bed (HOB): HOB elevated  Intervention: Promote and Optimize Oral Intake  Flowsheets (Taken 12/13/2020 0628)  Oral Nutrition Promotion:   calorie dense foods provided   rest periods promoted

## 2020-12-13 NOTE — ASSESSMENT & PLAN NOTE
Evidenced by hypokalemia, hypomagnesemia, hypophosphatemia  Pt with very poor nutritional status. Suspect mostly EtOH   Replete electrolytes PRN  Monitor CMP q8 hours  Consult nutrition

## 2020-12-13 NOTE — SUBJECTIVE & OBJECTIVE
Interval History: Feeling a little anxious. No other complaints.    Review of Systems   Constitutional: Positive for activity change and fatigue. Negative for chills and fever.   Respiratory: Negative for cough and shortness of breath.    Cardiovascular: Negative for chest pain and palpitations.   Gastrointestinal: Negative for abdominal distention and abdominal pain.   Neurological: Negative for seizures and syncope.     Objective:     Vital Signs (Most Recent):  Temp: 98.6 °F (37 °C) (12/13/20 1152)  Pulse: 79 (12/13/20 1152)  Resp: 17 (12/13/20 1152)  BP: 127/66 (12/13/20 1152)  SpO2: 96 % (12/13/20 1152) Vital Signs (24h Range):  Temp:  [96.3 °F (35.7 °C)-98.9 °F (37.2 °C)] 98.6 °F (37 °C)  Pulse:  [69-79] 79  Resp:  [17-18] 17  SpO2:  [95 %-99 %] 96 %  BP: (120-144)/(66-77) 127/66     Weight: 57.6 kg (126 lb 15.8 oz)  Body mass index is 23.23 kg/m².  No intake or output data in the 24 hours ending 12/13/20 1227   Physical Exam  Constitutional:       Appearance: Normal appearance.   Eyes:      Extraocular Movements: Extraocular movements intact.      Conjunctiva/sclera: Conjunctivae normal.   Neck:      Musculoskeletal: Normal range of motion and neck supple.   Cardiovascular:      Rate and Rhythm: Normal rate and regular rhythm.      Pulses: Normal pulses.      Heart sounds: Normal heart sounds.   Pulmonary:      Effort: Pulmonary effort is normal.      Breath sounds: Normal breath sounds.   Abdominal:      General: Abdomen is flat. There is no distension.      Tenderness: There is no abdominal tenderness. There is no guarding.   Skin:     General: Skin is warm and dry.      Coloration: Skin is not jaundiced.   Neurological:      Mental Status: She is alert.      Comments: No asterixis  +Slight tremor with outstretched hands (stable from 12/12)   Psychiatric:         Mood and Affect: Mood normal.         Behavior: Behavior normal.         Significant Labs: All pertinent labs within the past 24 hours have been  reviewed.    Significant Imaging: I have reviewed all pertinent imaging results/findings within the past 24 hours.

## 2020-12-14 PROBLEM — H92.01 RIGHT EAR PAIN: Status: ACTIVE | Noted: 2020-12-14

## 2020-12-14 LAB
ALBUMIN SERPL BCP-MCNC: 3.1 G/DL (ref 3.5–5.2)
ALBUMIN SERPL BCP-MCNC: 3.2 G/DL (ref 3.5–5.2)
ALP SERPL-CCNC: 105 U/L (ref 55–135)
ALP SERPL-CCNC: 110 U/L (ref 55–135)
ALP SERPL-CCNC: 111 U/L (ref 55–135)
ALP SERPL-CCNC: 112 U/L (ref 55–135)
ALT SERPL W/O P-5'-P-CCNC: 45 U/L (ref 10–44)
ALT SERPL W/O P-5'-P-CCNC: 46 U/L (ref 10–44)
ALT SERPL W/O P-5'-P-CCNC: 46 U/L (ref 10–44)
ALT SERPL W/O P-5'-P-CCNC: 50 U/L (ref 10–44)
ANION GAP SERPL CALC-SCNC: 6 MMOL/L (ref 8–16)
ANION GAP SERPL CALC-SCNC: 7 MMOL/L (ref 8–16)
ANION GAP SERPL CALC-SCNC: 8 MMOL/L (ref 8–16)
ANION GAP SERPL CALC-SCNC: 8 MMOL/L (ref 8–16)
AST SERPL-CCNC: 115 U/L (ref 10–40)
AST SERPL-CCNC: 122 U/L (ref 10–40)
AST SERPL-CCNC: 123 U/L (ref 10–40)
AST SERPL-CCNC: 132 U/L (ref 10–40)
BASOPHILS # BLD AUTO: 0.04 K/UL (ref 0–0.2)
BASOPHILS NFR BLD: 0.9 % (ref 0–1.9)
BILIRUB SERPL-MCNC: 1.2 MG/DL (ref 0.1–1)
BILIRUB SERPL-MCNC: 1.4 MG/DL (ref 0.1–1)
BILIRUB SERPL-MCNC: 1.5 MG/DL (ref 0.1–1)
BILIRUB SERPL-MCNC: 1.5 MG/DL (ref 0.1–1)
BUN SERPL-MCNC: 2 MG/DL (ref 6–20)
BUN SERPL-MCNC: 2 MG/DL (ref 6–20)
BUN SERPL-MCNC: <2 MG/DL (ref 6–20)
BUN SERPL-MCNC: <2 MG/DL (ref 6–20)
CALCIUM SERPL-MCNC: 7.1 MG/DL (ref 8.7–10.5)
CALCIUM SERPL-MCNC: 7.2 MG/DL (ref 8.7–10.5)
CALCIUM SERPL-MCNC: 7.3 MG/DL (ref 8.7–10.5)
CALCIUM SERPL-MCNC: 7.4 MG/DL (ref 8.7–10.5)
CHLORIDE SERPL-SCNC: 107 MMOL/L (ref 95–110)
CHLORIDE SERPL-SCNC: 108 MMOL/L (ref 95–110)
CO2 SERPL-SCNC: 22 MMOL/L (ref 23–29)
CO2 SERPL-SCNC: 23 MMOL/L (ref 23–29)
CO2 SERPL-SCNC: 25 MMOL/L (ref 23–29)
CO2 SERPL-SCNC: 25 MMOL/L (ref 23–29)
CREAT SERPL-MCNC: 0.5 MG/DL (ref 0.5–1.4)
DIFFERENTIAL METHOD: ABNORMAL
EOSINOPHIL # BLD AUTO: 0.1 K/UL (ref 0–0.5)
EOSINOPHIL NFR BLD: 2 % (ref 0–8)
ERYTHROCYTE [DISTWIDTH] IN BLOOD BY AUTOMATED COUNT: 13 % (ref 11.5–14.5)
EST. GFR  (AFRICAN AMERICAN): >60 ML/MIN/1.73 M^2
EST. GFR  (NON AFRICAN AMERICAN): >60 ML/MIN/1.73 M^2
GLUCOSE SERPL-MCNC: 88 MG/DL (ref 70–110)
GLUCOSE SERPL-MCNC: 89 MG/DL (ref 70–110)
GLUCOSE SERPL-MCNC: 90 MG/DL (ref 70–110)
GLUCOSE SERPL-MCNC: 92 MG/DL (ref 70–110)
HAV IGM SERPL QL IA: NEGATIVE
HBV CORE IGM SERPL QL IA: NEGATIVE
HBV SURFACE AG SERPL QL IA: NEGATIVE
HCT VFR BLD AUTO: 34.8 % (ref 37–48.5)
HCV AB SERPL QL IA: POSITIVE
HGB BLD-MCNC: 11.5 G/DL (ref 12–16)
IMM GRANULOCYTES # BLD AUTO: 0 K/UL (ref 0–0.04)
IMM GRANULOCYTES NFR BLD AUTO: 0 % (ref 0–0.5)
INR PPP: 1.1 (ref 0.8–1.2)
LYMPHOCYTES # BLD AUTO: 1.5 K/UL (ref 1–4.8)
LYMPHOCYTES NFR BLD: 33.3 % (ref 18–48)
MAGNESIUM SERPL-MCNC: 1.6 MG/DL (ref 1.6–2.6)
MAGNESIUM SERPL-MCNC: 1.6 MG/DL (ref 1.6–2.6)
MAGNESIUM SERPL-MCNC: 1.7 MG/DL (ref 1.6–2.6)
MAGNESIUM SERPL-MCNC: 2.1 MG/DL (ref 1.6–2.6)
MCH RBC QN AUTO: 34.8 PG (ref 27–31)
MCHC RBC AUTO-ENTMCNC: 33 G/DL (ref 32–36)
MCV RBC AUTO: 106 FL (ref 82–98)
MONOCYTES # BLD AUTO: 0.4 K/UL (ref 0.3–1)
MONOCYTES NFR BLD: 8.6 % (ref 4–15)
NEUTROPHILS # BLD AUTO: 2.4 K/UL (ref 1.8–7.7)
NEUTROPHILS NFR BLD: 55.2 % (ref 38–73)
NRBC BLD-RTO: 0 /100 WBC
PHOSPHATE SERPL-MCNC: 2.2 MG/DL (ref 2.7–4.5)
PHOSPHATE SERPL-MCNC: 3.5 MG/DL (ref 2.7–4.5)
PHOSPHATE SERPL-MCNC: 3.9 MG/DL (ref 2.7–4.5)
PLATELET # BLD AUTO: 107 K/UL (ref 150–350)
PMV BLD AUTO: 11 FL (ref 9.2–12.9)
POTASSIUM SERPL-SCNC: 3.6 MMOL/L (ref 3.5–5.1)
POTASSIUM SERPL-SCNC: 3.8 MMOL/L (ref 3.5–5.1)
POTASSIUM SERPL-SCNC: 4 MMOL/L (ref 3.5–5.1)
POTASSIUM SERPL-SCNC: 4 MMOL/L (ref 3.5–5.1)
PROT SERPL-MCNC: 7 G/DL (ref 6–8.4)
PROT SERPL-MCNC: 7 G/DL (ref 6–8.4)
PROT SERPL-MCNC: 7.2 G/DL (ref 6–8.4)
PROT SERPL-MCNC: 7.2 G/DL (ref 6–8.4)
PROTHROMBIN TIME: 12.3 SEC (ref 9–12.5)
RBC # BLD AUTO: 3.3 M/UL (ref 4–5.4)
SODIUM SERPL-SCNC: 137 MMOL/L (ref 136–145)
SODIUM SERPL-SCNC: 138 MMOL/L (ref 136–145)
SODIUM SERPL-SCNC: 138 MMOL/L (ref 136–145)
SODIUM SERPL-SCNC: 140 MMOL/L (ref 136–145)
WBC # BLD AUTO: 4.42 K/UL (ref 3.9–12.7)

## 2020-12-14 PROCEDURE — 63600175 PHARM REV CODE 636 W HCPCS: Performed by: INTERNAL MEDICINE

## 2020-12-14 PROCEDURE — 85025 COMPLETE CBC W/AUTO DIFF WBC: CPT

## 2020-12-14 PROCEDURE — 25000003 PHARM REV CODE 250: Performed by: INTERNAL MEDICINE

## 2020-12-14 PROCEDURE — 80053 COMPREHEN METABOLIC PANEL: CPT | Mod: 91

## 2020-12-14 PROCEDURE — 84100 ASSAY OF PHOSPHORUS: CPT | Mod: 91

## 2020-12-14 PROCEDURE — 21400001 HC TELEMETRY ROOM

## 2020-12-14 PROCEDURE — 84100 ASSAY OF PHOSPHORUS: CPT

## 2020-12-14 PROCEDURE — 25000003 PHARM REV CODE 250: Performed by: EMERGENCY MEDICINE

## 2020-12-14 PROCEDURE — 83735 ASSAY OF MAGNESIUM: CPT | Mod: 91

## 2020-12-14 PROCEDURE — 25000003 PHARM REV CODE 250: Performed by: PHYSICIAN ASSISTANT

## 2020-12-14 PROCEDURE — 94761 N-INVAS EAR/PLS OXIMETRY MLT: CPT

## 2020-12-14 PROCEDURE — 36415 COLL VENOUS BLD VENIPUNCTURE: CPT

## 2020-12-14 PROCEDURE — 97802 MEDICAL NUTRITION INDIV IN: CPT

## 2020-12-14 PROCEDURE — 85610 PROTHROMBIN TIME: CPT

## 2020-12-14 PROCEDURE — 80053 COMPREHEN METABOLIC PANEL: CPT

## 2020-12-14 PROCEDURE — 99900035 HC TECH TIME PER 15 MIN (STAT)

## 2020-12-14 PROCEDURE — 83735 ASSAY OF MAGNESIUM: CPT

## 2020-12-14 RX ADMIN — ONDANSETRON HYDROCHLORIDE 4 MG: 4 TABLET, FILM COATED ORAL at 12:12

## 2020-12-14 RX ADMIN — ONDANSETRON HYDROCHLORIDE 4 MG: 4 TABLET, FILM COATED ORAL at 11:12

## 2020-12-14 RX ADMIN — ONDANSETRON HYDROCHLORIDE 4 MG: 4 TABLET, FILM COATED ORAL at 05:12

## 2020-12-14 RX ADMIN — THIAMINE HCL TAB 100 MG 100 MG: 100 TAB at 09:12

## 2020-12-14 RX ADMIN — THERA TABS 1 TABLET: TAB at 09:12

## 2020-12-14 RX ADMIN — FOLIC ACID 1 MG: 1 TABLET ORAL at 09:12

## 2020-12-14 RX ADMIN — ACETAMINOPHEN 500 MG: 500 TABLET ORAL at 10:12

## 2020-12-14 RX ADMIN — DIAZEPAM 5 MG: 5 TABLET ORAL at 08:12

## 2020-12-14 RX ADMIN — SODIUM PHOSPHATE, MONOBASIC, MONOHYDRATE 30 MMOL: 276; 142 INJECTION, SOLUTION INTRAVENOUS at 09:12

## 2020-12-14 RX ADMIN — ACETAMINOPHEN 500 MG: 500 TABLET ORAL at 06:12

## 2020-12-14 RX ADMIN — DEXTROSE MONOHYDRATE, SODIUM CHLORIDE, AND POTASSIUM CHLORIDE 100 ML/HR: 50; 9; 1.49 INJECTION, SOLUTION INTRAVENOUS at 06:12

## 2020-12-14 RX ADMIN — CITALOPRAM HYDROBROMIDE 20 MG: 20 TABLET ORAL at 09:12

## 2020-12-14 RX ADMIN — ONDANSETRON HYDROCHLORIDE 4 MG: 4 TABLET, FILM COATED ORAL at 06:12

## 2020-12-14 RX ADMIN — DIAZEPAM 5 MG: 5 TABLET ORAL at 09:12

## 2020-12-14 RX ADMIN — ENOXAPARIN SODIUM 40 MG: 40 INJECTION SUBCUTANEOUS at 06:12

## 2020-12-14 NOTE — ASSESSMENT & PLAN NOTE
Evidenced by hypokalemia, hypomagnesemia, hypophosphatemia   Pt with very poor nutritional status. Suspect mostly EtOH   Replete electrolytes PRN  Monitor CMP q8 hours  Consult nutrition  Phos still low this AM. Replete. Monitor overnight

## 2020-12-14 NOTE — PLAN OF CARE
Problem: Fall Injury Risk  Goal: Absence of Fall and Fall-Related Injury  Outcome: Ongoing, Progressing     Problem: Adult Inpatient Plan of Care  Goal: Plan of Care Review  Outcome: Ongoing, Progressing   Pt AAOx4 and achieving adequate pain relief from prescribed meds. Frequent rounds made to assess for safety and discomfort, fall precautions maintained. Bed locked in lowest position. Call bell within reach. See corresponding flowsheets for full documentation. Will continue to monitor.

## 2020-12-14 NOTE — SUBJECTIVE & OBJECTIVE
Interval History: Slight anxiety. Some tremor. Improving overall.     Review of Systems   Constitutional: Positive for activity change and fatigue. Negative for chills and fever.   Respiratory: Negative for cough and shortness of breath.    Cardiovascular: Negative for chest pain and palpitations.   Gastrointestinal: Negative for abdominal distention and abdominal pain.   Neurological: Negative for seizures and syncope.   Psychiatric/Behavioral: The patient is nervous/anxious.      Objective:     Vital Signs (Most Recent):  Temp: 98.8 °F (37.1 °C) (12/14/20 1059)  Pulse: 70 (12/14/20 1059)  Resp: 18 (12/14/20 1059)  BP: 113/72 (12/14/20 1059)  SpO2: 97 % (12/14/20 1059) Vital Signs (24h Range):  Temp:  [97.2 °F (36.2 °C)-98.8 °F (37.1 °C)] 98.8 °F (37.1 °C)  Pulse:  [65-79] 70  Resp:  [17-18] 18  SpO2:  [96 %-99 %] 97 %  BP: (113-135)/(66-84) 113/72     Weight: 55.2 kg (121 lb 11.1 oz)  Body mass index is 22.26 kg/m².  No intake or output data in the 24 hours ending 12/14/20 1112   Physical Exam  Constitutional:       Appearance: Normal appearance.   Eyes:      Extraocular Movements: Extraocular movements intact.      Conjunctiva/sclera: Conjunctivae normal.   Neck:      Musculoskeletal: Normal range of motion and neck supple.   Cardiovascular:      Rate and Rhythm: Normal rate and regular rhythm.      Pulses: Normal pulses.      Heart sounds: Normal heart sounds.   Pulmonary:      Effort: Pulmonary effort is normal.      Breath sounds: Normal breath sounds.   Abdominal:      General: Abdomen is flat. There is no distension.      Tenderness: There is no abdominal tenderness. There is no guarding.   Skin:     General: Skin is warm and dry.      Coloration: Skin is not jaundiced.   Neurological:      Mental Status: She is alert.      Comments: No asterixis  +Slight tremor with outstretched hands - improved today   Psychiatric:         Mood and Affect: Mood normal.         Behavior: Behavior normal.         Significant  Labs: All pertinent labs within the past 24 hours have been reviewed.    Significant Imaging: I have reviewed all pertinent imaging results/findings within the past 24 hours.

## 2020-12-14 NOTE — PLAN OF CARE
12/14/20 1310   Discharge Reassessment   Assessment Type Discharge Planning Reassessment   Provided patient/caregiver education on the expected discharge date and the discharge plan Yes   Do you have any problems affording any of your prescribed medications? No   Discharge Plan A Home   DME Needed Upon Discharge  other (see comments)  (Pending)   Patient choice form signed by patient/caregiver N/A   Anticipated Discharge Disposition Home   Can the patient/caregiver answer the patient profile reliably? Yes, cognitively intact   How does the patient rate their overall health at the present time? Fair   Describe the patient's ability to walk at the present time. No restrictions   How often would a person be available to care for the patient? Occasionally   Post-Acute Status   Discharge Delays None known at this time

## 2020-12-14 NOTE — PROGRESS NOTES
[facility] Hospital Medicine  Progress Note    Patient Name: Carmen Landon  MRN: [id number]  Patient Class: IP- Inpatient   Admission Date: 12/11/2020  Length of Stay: 3 days  Attending Physician: Dmitry Egan DO  Primary Care Provider: LIZETH Holcomb        Subjective:     Principal Problem:Refeeding syndrome        HPI:   Carmen Landon is 46 y/o female with anxiety, depression, bipolar, and schizophrenia who presents to the hospital with complaining of anxiousness, bilateral lip numbness, and dysphoric mood. Per ED note, patient was tearful and stated she was sad but would not give any further details with EMS. Upon ED arrival, patient stated that she was here due to right ear pain. She endorsed large amount of alcohol usage last night. Denied any other symptoms including any hallucinations or intent to harm herself or others. During my exam, patient is very lethargic after given Ativan in ED. She is aroused briefly with tactile stimuli but falls right back to sleep. History is limited due to patient's mental status. Of note, patient was seen here on 10/8 of this year with labs indicative of alcohol abuse. Of note, the patient has been seen in the ED twice, on 6/29/17 and 7/2/14, also for alcohol intoxication. Recently seen here 3 weeks ago on 11/24/2020 for back pain.    In ED, COVID-19 negative. Labs remarkable with K+ 2.2, elevated AST//75. Drug screen with THC positive. ETOH > 450.          Overview/Hospital Course:  No notes on file    Interval History: Slight anxiety. Some tremor. Improving overall.     Review of Systems   Constitutional: Positive for activity change and fatigue. Negative for chills and fever.   Respiratory: Negative for cough and shortness of breath.    Cardiovascular: Negative for chest pain and palpitations.   Gastrointestinal: Negative for abdominal distention and abdominal pain.   Neurological: Negative for seizures and syncope.    Psychiatric/Behavioral: The patient is nervous/anxious.      Objective:     Vital Signs (Most Recent):  Temp: 98.8 °F (37.1 °C) (12/14/20 1059)  Pulse: 70 (12/14/20 1059)  Resp: 18 (12/14/20 1059)  BP: 113/72 (12/14/20 1059)  SpO2: 97 % (12/14/20 1059) Vital Signs (24h Range):  Temp:  [97.2 °F (36.2 °C)-98.8 °F (37.1 °C)] 98.8 °F (37.1 °C)  Pulse:  [65-79] 70  Resp:  [17-18] 18  SpO2:  [96 %-99 %] 97 %  BP: (113-135)/(66-84) 113/72     Weight: 55.2 kg (121 lb 11.1 oz)  Body mass index is 22.26 kg/m².  No intake or output data in the 24 hours ending 12/14/20 1112   Physical Exam  Constitutional:       Appearance: Normal appearance.   Eyes:      Extraocular Movements: Extraocular movements intact.      Conjunctiva/sclera: Conjunctivae normal.   Neck:      Musculoskeletal: Normal range of motion and neck supple.   Cardiovascular:      Rate and Rhythm: Normal rate and regular rhythm.      Pulses: Normal pulses.      Heart sounds: Normal heart sounds.   Pulmonary:      Effort: Pulmonary effort is normal.      Breath sounds: Normal breath sounds.   Abdominal:      General: Abdomen is flat. There is no distension.      Tenderness: There is no abdominal tenderness. There is no guarding.   Skin:     General: Skin is warm and dry.      Coloration: Skin is not jaundiced.   Neurological:      Mental Status: She is alert.      Comments: No asterixis  +Slight tremor with outstretched hands - improved today   Psychiatric:         Mood and Affect: Mood normal.         Behavior: Behavior normal.         Significant Labs: All pertinent labs within the past 24 hours have been reviewed.    Significant Imaging: I have reviewed all pertinent imaging results/findings within the past 24 hours.      Assessment/Plan:      * Refeeding syndrome  Evidenced by hypokalemia, hypomagnesemia, hypophosphatemia   Pt with very poor nutritional status. Suspect mostly EtOH   Replete electrolytes PRN  Monitor CMP q8 hours  Consult nutrition  Phos still  low this AM. Replete. Monitor overnight    Right ear pain  Dull fullness in right ear for 8 months  No erythema, drainage or obvious effusion  Possible eustachian tube dysfxn  Refer to ENT for further eval       Alcoholic hepatitis  No sign of acute liver failure  INR 1.1. No hepatic encephalopathy  AST/ALT cont to downtrend  No indication for steroid  Counseled extensively on EtOH cessation. She seems motivated.  Cont monitoring    Alcohol-induced anxiety disorder  Hx bipolar, schizophrenia, anxiety/depression. Presented with dysphoric mood with crying and expressing sadness, but denied SI or hallucinations.   Psych following - appreciate input  Patient will benefit from sobriety  SW consult for EtOH rehab resources    Alcohol withdrawal  Last drink 12/10  +Tremors continue on 12/14  Cont diazepam 5 mg TID + lorazepam 2 mg q 4 hours PRN  Will taper down once tremors improving      VTE Risk Mitigation (From admission, onward)         Ordered     enoxaparin injection 40 mg  Every 24 hours      12/12/20 1419     IP VTE LOW RISK PATIENT  Once      12/11/20 0554     Place sequential compression device  Until discontinued      12/11/20 0554     Place KEZIA hose  Until discontinued      12/11/20 0554                Discharge Planning   MARIA INES:      Code Status: Full Code   Is the patient medically ready for discharge?:     Reason for patient still in hospital (select all that apply): Treatment  Discharge Plan A: Home                  Dmitry Egan DO  Department of Hospital Medicine   [facility]

## 2020-12-14 NOTE — CONSULTS
[facility]  Adult Nutrition  Consult Note    SUMMARY     Recommendations    1. Continue clear liquid diet, advance as tolerated to regular diet.   2. Refeeding syndrome: continue to replace Phos (K, Mg and glucose in WNL), consintue thiamine supplement.   3. Weigh pt weekly.    Malnutrition Assessment:   Patient meets criteria for moderate protein-calorie malnutrition 2/2 inadequate energy intake and weight loss.     Goals: 1. Advance diet by next RD visit.  Nutrition Goal Status: new  Communication of RD Recs: (plan of care)    Thanks for the consult.     Reason for Assessment    Reason For Assessment: consult(Pt has refeeding syndrome & EtoH hepatitis)  Diagnosis: (refeeding syndrome)  Relevant Medical History: anemia, meningitis, alcohol abuse, addiction to drug (marijuana), psychiatric problem, current smoker,  Interdisciplinary Rounds: did not attend    General Information Comments: 45 y.o. female presented to the ED with anxiousness, bilateral lip numbness and dysphoris mood. Pt was + for THC and EtoH > 450. Pt was awake in bed at visit. Pt states she is doing well tolerating liquids and ready for solids. Pt states for the last couple of months shes been nauseated, had dizziness and ear pain which has affected her appetite. Pt states she has alos lost weight due to this. Pt reports -135 lbs (7%-10% weight loss). Last BM 12/12. NFPE not performed, will attempt at follow-up.    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  Spiritual, Cultural Beliefs, Protestant Practices, Values that Affect Care: no  Food Allergies: NKFA  Factors Affecting Nutritional Intake: clear liquid  "diet    Anthropometrics    Temp: 98.8 °F (37.1 °C)  Height Method: Stated  Height: 5' 2" (157.5 cm)  Height (inches): 62 in  Weight Method: Bed Scale  Weight: 55.2 kg (121 lb 11.1 oz)  Weight (lb): 121.7 lb  Ideal Body Weight (IBW), Female: 110 lb  % Ideal Body Weight, Female (lb): 120.25 %  BMI (Calculated): 22.3  BMI Grade: 18.5-24.9 - normal  Weight Loss: unintentional(7-10% weight loss in a couple of months per pt)    Lab/Procedures/Meds    Pertinent Labs Reviewed: reviewed  Pertinent Labs Comments: BUN < 2, Ca 7.1, Alb 3.2, Bili 1.5, , ALT 46, Phos 2.2  Pertinent Medications Reviewed: reviewed  Pertinent Medications Comments: folic acid, MVI, Na Phos, thiamine, dextrose 5% &0.9% Na Cl w/ K Cl    Estimated/Assessed Needs    Weight Used For Calorie Calculations: 55.2 kg (121 lb 11.1 oz)  Energy Calorie Requirements (kcal): 1656 kcal (30 kcal/kg)  Energy Need Method: Kcal/kg  Protein Requirements: 55-66g (1.0-1.2 g/kg)  Weight Used For Protein Calculations: 55.2 kg (121 lb 11.1 oz)  Fluid Requirements (mL): 1 ml/kcal or per MD  Estimated Fluid Requirement Method: RDA Method  RDA Method (mL): 1656  CHO Requirement: 210g daily    Nutrition Prescription Ordered    Current Diet Order: clear liquid    Evaluation of Received Nutrient/Fluid Intake    Total Calories (kcal/kg): 408(dextrose)  IV Fluid (mL): 2400  I/O: x4 UOP  Energy Calories Required: not meeting needs  Protein Required: not meeting needs  Fluid Required: meeting needs  Comments: Last BM 12/12  Tolerance: tolerating  % Intake of Estimated Energy Needs: 25-50 %  % Meal Intake: GINETTE    Nutrition Risk    Level of Risk/Frequency of Follow-up: moderate(2x/week)     Assessment and Plan  Nutrition Problem:  (Moderate) Protein-Calorie Malnutrition  Malnutrition in the context of Acute Illness/Injury    Related to (etiology):  Nausea, dizziness, ear pain    Signs and Symptoms (as evidenced by):  Energy Intake: <75% of estimated energy requirement for 3 " months  Weight Loss: 7-10% x ~3 months    Interventions(treatment strategy):  General healthful diet  Vitamin/mineral supplement therapy  Collaboration with other providers    Nutrition Diagnosis Status:  New    Monitor and Evaluation    Food and Nutrient Intake: energy intake, food and beverage intake  Food and Nutrient Adminstration: diet order  Knowledge/Beliefs/Attitudes: food and nutrition knowledge/skill  Physical Activity and Function: nutrition-related ADLs and IADLs  Anthropometric Measurements: weight, weight change  Biochemical Data, Medical Tests and Procedures: glucose/endocrine profile, electrolyte and renal panel  Nutrition-Focused Physical Findings: overall appearance, skin     Malnutrition Assessment: Patient meets criteria for moderate protein-calorie malnutrition 2/2 inadequate energy intake and weight loss.   Malnutrition Type: acute illness or injury  Energy Intake: (12/11-12/14: NPO/clears)  Teeth (Micronutrient): (WDL)       Weight Loss (Malnutrition): greater than 7.5% in 3 months(7-10% weight loss in a couple of months)  Energy Intake (Malnutrition): less than 75% for greater than or equal to 3 months       Edema (Fluid Accumulation): 0-->no edema present        Nutrition Follow-Up    RD Follow-up?: Yes

## 2020-12-14 NOTE — ASSESSMENT & PLAN NOTE
No sign of acute liver failure  INR 1.1. No hepatic encephalopathy  AST/ALT cont to downtrend  No indication for steroid  Counseled extensively on EtOH cessation. She seems motivated.  Cont monitoring

## 2020-12-14 NOTE — ASSESSMENT & PLAN NOTE
Dull fullness in right ear for 8 months  No erythema, drainage or obvious effusion  Possible eustachian tube dysfxn  Refer to ENT for further eval

## 2020-12-14 NOTE — PLAN OF CARE
Recommendations    1. Continue clear liquid diet, advance as tolerated to regular diet.   2. Refeeding syndrome: continue to replace Phos (K, Mg and glucose in WNL), consintue thiamine supplement.   3. Weigh pt weekly.    Malnutrition Assessment:   Patient meets criteria for moderate protein-calorie malnutrition 2/2 inadequate energy intake and weight loss.     Goals: 1. Advance diet by next RD visit.  Nutrition Goal Status: new  Communication of RD Recs: (plan of care)    Thanks for the consult.

## 2020-12-14 NOTE — ASSESSMENT & PLAN NOTE
Last drink 12/10  +Tremors continue on 12/14  Cont diazepam 5 mg TID + lorazepam 2 mg q 4 hours PRN  Will taper down once tremors improving

## 2020-12-15 LAB
ALBUMIN SERPL BCP-MCNC: 3 G/DL (ref 3.5–5.2)
ALBUMIN SERPL BCP-MCNC: 3.2 G/DL (ref 3.5–5.2)
ALBUMIN SERPL BCP-MCNC: 3.3 G/DL (ref 3.5–5.2)
ALBUMIN SERPL BCP-MCNC: 3.3 G/DL (ref 3.5–5.2)
ALP SERPL-CCNC: 111 U/L (ref 55–135)
ALP SERPL-CCNC: 113 U/L (ref 55–135)
ALP SERPL-CCNC: 116 U/L (ref 55–135)
ALP SERPL-CCNC: 97 U/L (ref 55–135)
ALT SERPL W/O P-5'-P-CCNC: 38 U/L (ref 10–44)
ALT SERPL W/O P-5'-P-CCNC: 41 U/L (ref 10–44)
ALT SERPL W/O P-5'-P-CCNC: 42 U/L (ref 10–44)
ALT SERPL W/O P-5'-P-CCNC: 43 U/L (ref 10–44)
ANION GAP SERPL CALC-SCNC: 6 MMOL/L (ref 8–16)
ANION GAP SERPL CALC-SCNC: 8 MMOL/L (ref 8–16)
ANION GAP SERPL CALC-SCNC: 9 MMOL/L (ref 8–16)
ANION GAP SERPL CALC-SCNC: 9 MMOL/L (ref 8–16)
AST SERPL-CCNC: 106 U/L (ref 10–40)
AST SERPL-CCNC: 110 U/L (ref 10–40)
AST SERPL-CCNC: 96 U/L (ref 10–40)
AST SERPL-CCNC: 98 U/L (ref 10–40)
BASOPHILS # BLD AUTO: 0.03 K/UL (ref 0–0.2)
BASOPHILS NFR BLD: 0.7 % (ref 0–1.9)
BILIRUB SERPL-MCNC: 1.1 MG/DL (ref 0.1–1)
BILIRUB SERPL-MCNC: 1.3 MG/DL (ref 0.1–1)
BILIRUB SERPL-MCNC: 1.3 MG/DL (ref 0.1–1)
BILIRUB SERPL-MCNC: 1.4 MG/DL (ref 0.1–1)
BUN SERPL-MCNC: 2 MG/DL (ref 6–20)
BUN SERPL-MCNC: 2 MG/DL (ref 6–20)
BUN SERPL-MCNC: <2 MG/DL (ref 6–20)
BUN SERPL-MCNC: <2 MG/DL (ref 6–20)
CALCIUM SERPL-MCNC: 7.3 MG/DL (ref 8.7–10.5)
CALCIUM SERPL-MCNC: 7.5 MG/DL (ref 8.7–10.5)
CALCIUM SERPL-MCNC: 8.2 MG/DL (ref 8.7–10.5)
CALCIUM SERPL-MCNC: 8.4 MG/DL (ref 8.7–10.5)
CHLORIDE SERPL-SCNC: 104 MMOL/L (ref 95–110)
CHLORIDE SERPL-SCNC: 105 MMOL/L (ref 95–110)
CHLORIDE SERPL-SCNC: 108 MMOL/L (ref 95–110)
CHLORIDE SERPL-SCNC: 109 MMOL/L (ref 95–110)
CO2 SERPL-SCNC: 21 MMOL/L (ref 23–29)
CO2 SERPL-SCNC: 24 MMOL/L (ref 23–29)
CREAT SERPL-MCNC: 0.5 MG/DL (ref 0.5–1.4)
CREAT SERPL-MCNC: 0.7 MG/DL (ref 0.5–1.4)
DIFFERENTIAL METHOD: ABNORMAL
EOSINOPHIL # BLD AUTO: 0.1 K/UL (ref 0–0.5)
EOSINOPHIL NFR BLD: 1.8 % (ref 0–8)
ERYTHROCYTE [DISTWIDTH] IN BLOOD BY AUTOMATED COUNT: 13.1 % (ref 11.5–14.5)
EST. GFR  (AFRICAN AMERICAN): >60 ML/MIN/1.73 M^2
EST. GFR  (NON AFRICAN AMERICAN): >60 ML/MIN/1.73 M^2
GLUCOSE SERPL-MCNC: 84 MG/DL (ref 70–110)
GLUCOSE SERPL-MCNC: 94 MG/DL (ref 70–110)
GLUCOSE SERPL-MCNC: 96 MG/DL (ref 70–110)
GLUCOSE SERPL-MCNC: 97 MG/DL (ref 70–110)
HCT VFR BLD AUTO: 33.8 % (ref 37–48.5)
HGB BLD-MCNC: 11.2 G/DL (ref 12–16)
IMM GRANULOCYTES # BLD AUTO: 0.01 K/UL (ref 0–0.04)
IMM GRANULOCYTES NFR BLD AUTO: 0.2 % (ref 0–0.5)
INR PPP: 1.1 (ref 0.8–1.2)
LYMPHOCYTES # BLD AUTO: 1.6 K/UL (ref 1–4.8)
LYMPHOCYTES NFR BLD: 37.3 % (ref 18–48)
MAGNESIUM SERPL-MCNC: 1.5 MG/DL (ref 1.6–2.6)
MAGNESIUM SERPL-MCNC: 1.6 MG/DL (ref 1.6–2.6)
MAGNESIUM SERPL-MCNC: 1.8 MG/DL (ref 1.6–2.6)
MAGNESIUM SERPL-MCNC: 1.8 MG/DL (ref 1.6–2.6)
MCH RBC QN AUTO: 35.1 PG (ref 27–31)
MCHC RBC AUTO-ENTMCNC: 33.1 G/DL (ref 32–36)
MCV RBC AUTO: 106 FL (ref 82–98)
MONOCYTES # BLD AUTO: 0.4 K/UL (ref 0.3–1)
MONOCYTES NFR BLD: 9.6 % (ref 4–15)
NEUTROPHILS # BLD AUTO: 2.2 K/UL (ref 1.8–7.7)
NEUTROPHILS NFR BLD: 50.4 % (ref 38–73)
NRBC BLD-RTO: 0 /100 WBC
PHOSPHATE SERPL-MCNC: 2.2 MG/DL (ref 2.7–4.5)
PHOSPHATE SERPL-MCNC: 3.9 MG/DL (ref 2.7–4.5)
PHOSPHATE SERPL-MCNC: 4.3 MG/DL (ref 2.7–4.5)
PLATELET # BLD AUTO: 116 K/UL (ref 150–350)
PMV BLD AUTO: 11.4 FL (ref 9.2–12.9)
POTASSIUM SERPL-SCNC: 3.7 MMOL/L (ref 3.5–5.1)
POTASSIUM SERPL-SCNC: 3.8 MMOL/L (ref 3.5–5.1)
POTASSIUM SERPL-SCNC: 4.2 MMOL/L (ref 3.5–5.1)
POTASSIUM SERPL-SCNC: 4.4 MMOL/L (ref 3.5–5.1)
PROT SERPL-MCNC: 6.6 G/DL (ref 6–8.4)
PROT SERPL-MCNC: 7 G/DL (ref 6–8.4)
PROT SERPL-MCNC: 7.2 G/DL (ref 6–8.4)
PROT SERPL-MCNC: 7.2 G/DL (ref 6–8.4)
PROTHROMBIN TIME: 12.7 SEC (ref 9–12.5)
RBC # BLD AUTO: 3.19 M/UL (ref 4–5.4)
SODIUM SERPL-SCNC: 137 MMOL/L (ref 136–145)
SODIUM SERPL-SCNC: 137 MMOL/L (ref 136–145)
SODIUM SERPL-SCNC: 138 MMOL/L (ref 136–145)
SODIUM SERPL-SCNC: 139 MMOL/L (ref 136–145)
WBC # BLD AUTO: 4.37 K/UL (ref 3.9–12.7)

## 2020-12-15 PROCEDURE — 84100 ASSAY OF PHOSPHORUS: CPT | Mod: 91

## 2020-12-15 PROCEDURE — 83735 ASSAY OF MAGNESIUM: CPT | Mod: 91

## 2020-12-15 PROCEDURE — 99232 PR SUBSEQUENT HOSPITAL CARE,LEVL II: ICD-10-PCS | Mod: AF,HB,, | Performed by: PSYCHIATRY & NEUROLOGY

## 2020-12-15 PROCEDURE — 21400001 HC TELEMETRY ROOM

## 2020-12-15 PROCEDURE — 25000003 PHARM REV CODE 250: Performed by: INTERNAL MEDICINE

## 2020-12-15 PROCEDURE — 85610 PROTHROMBIN TIME: CPT

## 2020-12-15 PROCEDURE — 80053 COMPREHEN METABOLIC PANEL: CPT | Mod: 91

## 2020-12-15 PROCEDURE — 84100 ASSAY OF PHOSPHORUS: CPT

## 2020-12-15 PROCEDURE — 99232 SBSQ HOSP IP/OBS MODERATE 35: CPT | Mod: AF,HB,, | Performed by: PSYCHIATRY & NEUROLOGY

## 2020-12-15 PROCEDURE — 25000003 PHARM REV CODE 250: Performed by: EMERGENCY MEDICINE

## 2020-12-15 PROCEDURE — 83735 ASSAY OF MAGNESIUM: CPT

## 2020-12-15 PROCEDURE — 63600175 PHARM REV CODE 636 W HCPCS: Performed by: INTERNAL MEDICINE

## 2020-12-15 PROCEDURE — 80053 COMPREHEN METABOLIC PANEL: CPT

## 2020-12-15 PROCEDURE — 85025 COMPLETE CBC W/AUTO DIFF WBC: CPT

## 2020-12-15 PROCEDURE — 94761 N-INVAS EAR/PLS OXIMETRY MLT: CPT

## 2020-12-15 PROCEDURE — 36415 COLL VENOUS BLD VENIPUNCTURE: CPT

## 2020-12-15 RX ORDER — DIAZEPAM 2 MG/1
2 TABLET ORAL EVERY 12 HOURS
Status: DISCONTINUED | OUTPATIENT
Start: 2020-12-15 | End: 2020-12-16 | Stop reason: HOSPADM

## 2020-12-15 RX ORDER — MAGNESIUM SULFATE HEPTAHYDRATE 40 MG/ML
2 INJECTION, SOLUTION INTRAVENOUS ONCE
Status: COMPLETED | OUTPATIENT
Start: 2020-12-15 | End: 2020-12-15

## 2020-12-15 RX ADMIN — DEXTROSE MONOHYDRATE, SODIUM CHLORIDE, AND POTASSIUM CHLORIDE 100 ML/HR: 50; 9; 1.49 INJECTION, SOLUTION INTRAVENOUS at 03:12

## 2020-12-15 RX ADMIN — SODIUM PHOSPHATE, MONOBASIC, MONOHYDRATE 39.99 MMOL: 276; 142 INJECTION, SOLUTION INTRAVENOUS at 10:12

## 2020-12-15 RX ADMIN — ONDANSETRON HYDROCHLORIDE 4 MG: 4 TABLET, FILM COATED ORAL at 06:12

## 2020-12-15 RX ADMIN — ACETAMINOPHEN 500 MG: 500 TABLET ORAL at 10:12

## 2020-12-15 RX ADMIN — DIAZEPAM 2 MG: 2 TABLET ORAL at 09:12

## 2020-12-15 RX ADMIN — ONDANSETRON HYDROCHLORIDE 4 MG: 4 TABLET, FILM COATED ORAL at 01:12

## 2020-12-15 RX ADMIN — ONDANSETRON HYDROCHLORIDE 4 MG: 4 TABLET, FILM COATED ORAL at 11:12

## 2020-12-15 RX ADMIN — MAGNESIUM SULFATE 2 G: 2 INJECTION INTRAVENOUS at 10:12

## 2020-12-15 RX ADMIN — ACETAMINOPHEN 500 MG: 500 TABLET ORAL at 09:12

## 2020-12-15 RX ADMIN — THERA TABS 1 TABLET: TAB at 10:12

## 2020-12-15 RX ADMIN — DIAZEPAM 2 MG: 2 TABLET ORAL at 10:12

## 2020-12-15 RX ADMIN — ONDANSETRON HYDROCHLORIDE 4 MG: 4 TABLET, FILM COATED ORAL at 05:12

## 2020-12-15 RX ADMIN — FOLIC ACID 1 MG: 1 TABLET ORAL at 10:12

## 2020-12-15 RX ADMIN — ENOXAPARIN SODIUM 40 MG: 40 INJECTION SUBCUTANEOUS at 05:12

## 2020-12-15 RX ADMIN — CITALOPRAM HYDROBROMIDE 20 MG: 20 TABLET ORAL at 10:12

## 2020-12-15 RX ADMIN — ACETAMINOPHEN 500 MG: 500 TABLET ORAL at 01:12

## 2020-12-15 RX ADMIN — DOCUSATE SODIUM 50 MG AND SENNOSIDES 8.6 MG 1 TABLET: 8.6; 5 TABLET, FILM COATED ORAL at 10:12

## 2020-12-15 RX ADMIN — THIAMINE HCL TAB 100 MG 100 MG: 100 TAB at 10:12

## 2020-12-15 NOTE — ASSESSMENT & PLAN NOTE
Hx bipolar, schizophrenia, anxiety/depression. Presented with dysphoric mood with crying and expressing sadness, but denied SI or hallucinations.   Psych following - appreciate input  Patient will benefit from sobriety

## 2020-12-15 NOTE — NURSING
Report given to oncoming nurse Katharine RN, Patient is awake and alert. Bed is in lowest position, wheels locked, call light is in reach. 12 hour chart check completed

## 2020-12-15 NOTE — HOSPITAL COURSE
12/15/2020 - Patient is seen in her room.  Denies any psychiatric complaints and feels better.  Says that she does not want to return to drinking but does not want to go to rehab.

## 2020-12-15 NOTE — ASSESSMENT & PLAN NOTE
Evidenced by hypokalemia, hypomagnesemia, hypophosphatemia   Pt with very poor nutritional status. Suspect mostly EtOH   Phos remains low  Replete PRN  Cont inpatient monitoring

## 2020-12-15 NOTE — SUBJECTIVE & OBJECTIVE
"Interval History: improved    Family History     Problem Relation (Age of Onset)    Alcohol abuse Mother    Heart disease Father    Kidney disease Mother        Tobacco Use    Smoking status: Current Every Day Smoker     Packs/day: 0.50     Types: Cigarettes    Smokeless tobacco: Never Used   Substance and Sexual Activity    Alcohol use: Yes     Comment: 1 pint whiskey daily and additional beers    Drug use: Yes     Types: Marijuana     Comment: history of heroin and cocaine    Sexual activity: Yes     Birth control/protection: See Surgical Hx     Psychotherapeutics (From admission, onward)    Start     Stop Route Frequency Ordered    12/15/20 0945  diazePAM tablet 2 mg      -- Oral Every 12 hours 12/15/20 0936    12/11/20 0900  citalopram tablet 20 mg      -- Oral Daily 12/11/20 0554    12/11/20 0656  LORazepam injection 2 mg      -- IV Every 4 hours PRN 12/11/20 0556           Review of Systems   Constitutional: Positive for activity change and fatigue.   Respiratory: Negative for shortness of breath.    Cardiovascular: Negative for chest pain.   Gastrointestinal: Negative for nausea.   Musculoskeletal: Positive for myalgias.   Neurological: Positive for tremors and weakness.        Balance problems   Psychiatric/Behavioral: Negative for decreased concentration, dysphoric mood, sleep disturbance and suicidal ideas. The patient is nervous/anxious.      Objective:     Vital Signs (Most Recent):  Temp: 98.7 °F (37.1 °C) (12/15/20 1136)  Pulse: 63 (12/15/20 1136)  Resp: 19 (12/15/20 1136)  BP: (!) 147/70 (12/15/20 1136)  SpO2: 99 % (12/15/20 1136) Vital Signs (24h Range):  Temp:  [98 °F (36.7 °C)-98.9 °F (37.2 °C)] 98.7 °F (37.1 °C)  Pulse:  [60-69] 63  Resp:  [18-19] 19  SpO2:  [97 %-100 %] 99 %  BP: (117-147)/(63-81) 147/70     Height: 5' 2" (157.5 cm)  Weight: 55.2 kg (121 lb 11.1 oz)  Body mass index is 22.26 kg/m².      Intake/Output Summary (Last 24 hours) at 12/15/2020 1410  Last data filed at 12/15/2020 " "0620  Gross per 24 hour   Intake 1100 ml   Output 250 ml   Net 850 ml       Physical Exam  Psychiatric:      Comments: EXAMINATION    CONSTITUTIONAL  General Appearance: hospital attire    MUSCULOSKELETAL  Muscle Strength and Tone: weak  Abnormal Involuntary Movements: tremor noted  Gait and Station: not observed    PSYCHIATRIC MENTAL STATUS EXAM   Level of Consciousness: awake and alert  Orientation: name, place, date, situation  Grooming: limited  Psychomotor Behavior: restless  Speech: normal rate, normal volume  Language: no abnormalities  Mood: "good"  Affect: anxious  Thought Process: linear  Associations: intact  Thought Content: denies suicidal/homicidal/psychosis  Memory: intact to recent and remote  Attention: somewhat distracted  Fund of Knowledge: simple to conversation  Insight: poor into alcohol use  Judgment: limited into wanting sobriety          Finger to nose ataxia with broadening tremor at intention  Hand flap asterixis noted            Significant Labs:   Last 24 Hours:   Recent Lab Results       12/15/20  1201   12/15/20  0358   12/15/20  0000   12/14/20  1553        Albumin 3.3 3.0 3.2 3.2     Alkaline Phosphatase 111 97 113 112     ALT 42 38 43 45     Anion Gap 8 6 9 8      98 110 115     Baso #   0.03         Basophil %   0.7         BILIRUBIN TOTAL 1.3  Comment:  For infants and newborns, interpretation of results should be based  on gestational age, weight and in agreement with clinical  observations.  Premature Infant recommended reference ranges:  Up to 24 hours.............<8.0 mg/dL  Up to 48 hours............<12.0 mg/dL  3-5 days..................<15.0 mg/dL  6-29 days.................<15.0 mg/dL   1.3  Comment:  For infants and newborns, interpretation of results should be based  on gestational age, weight and in agreement with clinical  observations.  Premature Infant recommended reference ranges:  Up to 24 hours.............<8.0 mg/dL  Up to 48 hours............<12.0 " mg/dL  3-5 days..................<15.0 mg/dL  6-29 days.................<15.0 mg/dL   1.4  Comment:  For infants and newborns, interpretation of results should be based  on gestational age, weight and in agreement with clinical  observations.  Premature Infant recommended reference ranges:  Up to 24 hours.............<8.0 mg/dL  Up to 48 hours............<12.0 mg/dL  3-5 days..................<15.0 mg/dL  6-29 days.................<15.0 mg/dL   1.2  Comment:  For infants and newborns, interpretation of results should be based  on gestational age, weight and in agreement with clinical  observations.  Premature Infant recommended reference ranges:  Up to 24 hours.............<8.0 mg/dL  Up to 48 hours............<12.0 mg/dL  3-5 days..................<15.0 mg/dL  6-29 days.................<15.0 mg/dL       BUN 2 <2 <2 <2     Calcium 8.2 7.3 7.5 7.4     Chloride 105 109 108 107     CO2 24 24 21 23     Creatinine 0.5 0.5 0.5 0.5     Differential Method   Automated         eGFR if  >60 >60 >60 >60     eGFR if non  >60  Comment:  Calculation used to obtain the estimated glomerular filtration  rate (eGFR) is the CKD-EPI equation.    >60  Comment:  Calculation used to obtain the estimated glomerular filtration  rate (eGFR) is the CKD-EPI equation.    >60  Comment:  Calculation used to obtain the estimated glomerular filtration  rate (eGFR) is the CKD-EPI equation.    >60  Comment:  Calculation used to obtain the estimated glomerular filtration  rate (eGFR) is the CKD-EPI equation.        Eos #   0.1         Eosinophil %   1.8         Glucose 96 84 94 90     Gran # (ANC)   2.2         Gran %   50.4         Hematocrit   33.8         Hemoglobin   11.2         Immature Grans (Abs)   0.01  Comment:  Mild elevation in immature granulocytes is non specific and   can be seen in a variety of conditions including stress response,   acute inflammation, trauma and pregnancy. Correlation with other    laboratory and clinical findings is essential.           Immature Granulocytes   0.2         INR   1.1  Comment:  Coumadin Therapy:  2.0 - 3.0 for INR for all indicators except mechanical heart valves  and antiphospholipid syndromes which should use 2.5 - 3.5.           Lymph #   1.6         Lymph %   37.3         Magnesium 1.8 1.6 1.5 1.6     MCH   35.1         MCHC   33.1         MCV   106         Mono #   0.4         Mono %   9.6         MPV   11.4         nRBC   0         Phosphorus 3.9   2.2 3.9     Platelets   116         Potassium 4.2 3.7 3.8 4.0     PROTEIN TOTAL 7.2 6.6 7.0 7.2     Protime   12.7         RBC   3.19         RDW   13.1         Sodium 137 139 138 138     WBC   4.37             All pertinent labs within the past 24 hours have been reviewed.    Significant Imaging: I have reviewed all pertinent imaging results/findings within the past 24 hours.

## 2020-12-15 NOTE — SUBJECTIVE & OBJECTIVE
Interval History: Tremor improved. Wants to go home. Tolerating diet. No other complaints.     Review of Systems   Constitutional: Negative for activity change, chills, fatigue and fever.   Respiratory: Negative for cough and shortness of breath.    Cardiovascular: Negative for chest pain and palpitations.   Gastrointestinal: Negative for abdominal distention and abdominal pain.   Neurological: Negative for tremors, seizures and syncope.   Psychiatric/Behavioral: The patient is not nervous/anxious.      Objective:     Vital Signs (Most Recent):  Temp: 98.2 °F (36.8 °C) (12/15/20 0828)  Pulse: 60 (12/15/20 0828)  Resp: 19 (12/15/20 0828)  BP: 125/74 (12/15/20 0828)  SpO2: 98 % (12/15/20 0828) Vital Signs (24h Range):  Temp:  [98 °F (36.7 °C)-98.9 °F (37.2 °C)] 98.2 °F (36.8 °C)  Pulse:  [60-70] 60  Resp:  [18-19] 19  SpO2:  [97 %-100 %] 98 %  BP: (113-130)/(63-81) 125/74     Weight: 55.2 kg (121 lb 11.1 oz)  Body mass index is 22.26 kg/m².    Intake/Output Summary (Last 24 hours) at 12/15/2020 0934  Last data filed at 12/15/2020 0620  Gross per 24 hour   Intake 1100 ml   Output 250 ml   Net 850 ml      Physical Exam  Constitutional:       Appearance: Normal appearance.   Eyes:      Extraocular Movements: Extraocular movements intact.      Conjunctiva/sclera: Conjunctivae normal.   Neck:      Musculoskeletal: Normal range of motion and neck supple.   Cardiovascular:      Rate and Rhythm: Normal rate and regular rhythm.      Pulses: Normal pulses.      Heart sounds: Normal heart sounds.   Pulmonary:      Effort: Pulmonary effort is normal.      Breath sounds: Normal breath sounds.   Abdominal:      General: Abdomen is flat. There is no distension.      Tenderness: There is no abdominal tenderness. There is no guarding.   Skin:     General: Skin is warm and dry.      Coloration: Skin is not jaundiced.   Neurological:      Mental Status: She is alert.      Comments: No asterixis  Tremor resolved   Psychiatric:          Mood and Affect: Mood normal.         Behavior: Behavior normal.         Significant Labs: All pertinent labs within the past 24 hours have been reviewed.    Significant Imaging: I have reviewed all pertinent imaging results/findings within the past 24 hours.

## 2020-12-15 NOTE — ASSESSMENT & PLAN NOTE
No evidence of primary major depression, bipolar, schizophrenia, anxiety disorder.  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  Minimizes symptoms and doesn't need psychiatric medications at this time.  Symptoms should improve with sobriety so focus should be on inpatient rehab.  No need for acute inpatient psychiatric hospitalization at this time.    12/15/20 - Patient not showing any s/sx of anxiety or depression at this time.  Can continue citalopram 20mg daily but should consider tapering off as outpatient being that most of her symptoms are related to heavy alcohol use and not primary depression or anxiety.

## 2020-12-15 NOTE — PROGRESS NOTES
"[facility]  Psychiatry  Progress Note    Patient Name: Carmen Landon  MRN: [id number]   Code Status: Full Code  Admission Date: 12/11/2020  Hospital Length of Stay: 4 days  Expected Discharge Date:   Attending Physician: Dmitry Egan DO  Primary Care Provider: LIZETH Holcomb    Current Legal Status: Formal Voluntary Admission (FVA)    Patient information was obtained from patient, chart review, nursing,  and ER records.       Subjective:     Patient is a 45 y.o., female, presents with:    Principal Problem:Refeeding syndrome    Chief Complaint: alcohol use    HPI: Patient Carmen Landon presents to the hospital with anxiousness, bilateral lip numbness, and dysphoric mood.  Found in ED, labs remarkable with K+ 2.2, elevated AST//75. Drug screen with THC positive. ETOH > 450.  Psychiatric consult placed for evaluation of symptoms at presentation.  Chart notes that patient has other presentations for alcohol intoxication.  She is easily approached in conversation and says that she is here because she feels like she has something in her ear messing with hearing and balance.  She feels like it is something physical in the canal.  She minimizes any symptoms of depression and is not currently depressed.  No loss of interest.  Says that she lacks energy and motivation to do things that she would like to do.  Says that her sleep is good.  No suicidal ideations current or past.  Admits to being a worrisome person who worries about things that she should not worry about.  Worries exacerbate tremors which is a daily thing.  Tremors are from morning until she passes out intoxicated each night.  Denies any manic or psychotic history outside of withdrawal which got her into a psychiatric hospital years ago.  She works as a / and drinks 1 pint of whiskey in addition to "few" beers daily.  Has been doing this for years.  Past rehab at [facility].  Says that she " "was at rehab "for a while" and remained sober while there but started drinking upon discharge.  No maintained sobriety.  She did not return to cocaine or heroin after discharge from rehab but does smoke marijuana daily.      Hospital Course: 12/15/2020 - Patient is seen in her room.  Denies any psychiatric complaints and feels better.  Says that she does not want to return to drinking but does not want to go to rehab.      Interval History: improved    Family History     Problem Relation (Age of Onset)    Alcohol abuse Mother    Heart disease Father    Kidney disease Mother        Tobacco Use    Smoking status: Current Every Day Smoker     Packs/day: 0.50     Types: Cigarettes    Smokeless tobacco: Never Used   Substance and Sexual Activity    Alcohol use: Yes     Comment: 1 pint whiskey daily and additional beers    Drug use: Yes     Types: Marijuana     Comment: history of heroin and cocaine    Sexual activity: Yes     Birth control/protection: See Surgical Hx     Psychotherapeutics (From admission, onward)    Start     Stop Route Frequency Ordered    12/15/20 0945  diazePAM tablet 2 mg      -- Oral Every 12 hours 12/15/20 0936    12/11/20 0900  citalopram tablet 20 mg      -- Oral Daily 12/11/20 0554    12/11/20 0656  LORazepam injection 2 mg      -- IV Every 4 hours PRN 12/11/20 0556           Review of Systems   Constitutional: Positive for activity change and fatigue.   Respiratory: Negative for shortness of breath.    Cardiovascular: Negative for chest pain.   Gastrointestinal: Negative for nausea.   Musculoskeletal: Positive for myalgias.   Neurological: Positive for tremors and weakness.        Balance problems   Psychiatric/Behavioral: Negative for decreased concentration, dysphoric mood, sleep disturbance and suicidal ideas. The patient is nervous/anxious.      Objective:     Vital Signs (Most Recent):  Temp: 98.7 °F (37.1 °C) (12/15/20 1136)  Pulse: 63 (12/15/20 1136)  Resp: 19 (12/15/20 1136)  BP: " "(!) 147/70 (12/15/20 1136)  SpO2: 99 % (12/15/20 1136) Vital Signs (24h Range):  Temp:  [98 °F (36.7 °C)-98.9 °F (37.2 °C)] 98.7 °F (37.1 °C)  Pulse:  [60-69] 63  Resp:  [18-19] 19  SpO2:  [97 %-100 %] 99 %  BP: (117-147)/(63-81) 147/70     Height: 5' 2" (157.5 cm)  Weight: 55.2 kg (121 lb 11.1 oz)  Body mass index is 22.26 kg/m².      Intake/Output Summary (Last 24 hours) at 12/15/2020 1410  Last data filed at 12/15/2020 0620  Gross per 24 hour   Intake 1100 ml   Output 250 ml   Net 850 ml       Physical Exam  Psychiatric:      Comments: EXAMINATION    CONSTITUTIONAL  General Appearance: hospital attire    MUSCULOSKELETAL  Muscle Strength and Tone: weak  Abnormal Involuntary Movements: tremor noted  Gait and Station: not observed    PSYCHIATRIC MENTAL STATUS EXAM   Level of Consciousness: awake and alert  Orientation: name, place, date, situation  Grooming: limited  Psychomotor Behavior: restless  Speech: normal rate, normal volume  Language: no abnormalities  Mood: "good"  Affect: anxious  Thought Process: linear  Associations: intact  Thought Content: denies suicidal/homicidal/psychosis  Memory: intact to recent and remote  Attention: somewhat distracted  Fund of Knowledge: simple to conversation  Insight: poor into alcohol use  Judgment: limited into wanting sobriety          Finger to nose ataxia with broadening tremor at intention  Hand flap asterixis noted            Significant Labs:   Last 24 Hours:   Recent Lab Results       12/15/20  1201   12/15/20  0358   12/15/20  0000   12/14/20  1553        Albumin 3.3 3.0 3.2 3.2     Alkaline Phosphatase 111 97 113 112     ALT 42 38 43 45     Anion Gap 8 6 9 8      98 110 115     Baso #   0.03         Basophil %   0.7         BILIRUBIN TOTAL 1.3  Comment:  For infants and newborns, interpretation of results should be based  on gestational age, weight and in agreement with clinical  observations.  Premature Infant recommended reference ranges:  Up to 24 " hours.............<8.0 mg/dL  Up to 48 hours............<12.0 mg/dL  3-5 days..................<15.0 mg/dL  6-29 days.................<15.0 mg/dL   1.3  Comment:  For infants and newborns, interpretation of results should be based  on gestational age, weight and in agreement with clinical  observations.  Premature Infant recommended reference ranges:  Up to 24 hours.............<8.0 mg/dL  Up to 48 hours............<12.0 mg/dL  3-5 days..................<15.0 mg/dL  6-29 days.................<15.0 mg/dL   1.4  Comment:  For infants and newborns, interpretation of results should be based  on gestational age, weight and in agreement with clinical  observations.  Premature Infant recommended reference ranges:  Up to 24 hours.............<8.0 mg/dL  Up to 48 hours............<12.0 mg/dL  3-5 days..................<15.0 mg/dL  6-29 days.................<15.0 mg/dL   1.2  Comment:  For infants and newborns, interpretation of results should be based  on gestational age, weight and in agreement with clinical  observations.  Premature Infant recommended reference ranges:  Up to 24 hours.............<8.0 mg/dL  Up to 48 hours............<12.0 mg/dL  3-5 days..................<15.0 mg/dL  6-29 days.................<15.0 mg/dL       BUN 2 <2 <2 <2     Calcium 8.2 7.3 7.5 7.4     Chloride 105 109 108 107     CO2 24 24 21 23     Creatinine 0.5 0.5 0.5 0.5     Differential Method   Automated         eGFR if  >60 >60 >60 >60     eGFR if non  >60  Comment:  Calculation used to obtain the estimated glomerular filtration  rate (eGFR) is the CKD-EPI equation.    >60  Comment:  Calculation used to obtain the estimated glomerular filtration  rate (eGFR) is the CKD-EPI equation.    >60  Comment:  Calculation used to obtain the estimated glomerular filtration  rate (eGFR) is the CKD-EPI equation.    >60  Comment:  Calculation used to obtain the estimated glomerular filtration  rate (eGFR) is the CKD-EPI  equation.        Eos #   0.1         Eosinophil %   1.8         Glucose 96 84 94 90     Gran # (ANC)   2.2         Gran %   50.4         Hematocrit   33.8         Hemoglobin   11.2         Immature Grans (Abs)   0.01  Comment:  Mild elevation in immature granulocytes is non specific and   can be seen in a variety of conditions including stress response,   acute inflammation, trauma and pregnancy. Correlation with other   laboratory and clinical findings is essential.           Immature Granulocytes   0.2         INR   1.1  Comment:  Coumadin Therapy:  2.0 - 3.0 for INR for all indicators except mechanical heart valves  and antiphospholipid syndromes which should use 2.5 - 3.5.           Lymph #   1.6         Lymph %   37.3         Magnesium 1.8 1.6 1.5 1.6     MCH   35.1         MCHC   33.1         MCV   106         Mono #   0.4         Mono %   9.6         MPV   11.4         nRBC   0         Phosphorus 3.9   2.2 3.9     Platelets   116         Potassium 4.2 3.7 3.8 4.0     PROTEIN TOTAL 7.2 6.6 7.0 7.2     Protime   12.7         RBC   3.19         RDW   13.1         Sodium 137 139 138 138     WBC   4.37             All pertinent labs within the past 24 hours have been reviewed.    Significant Imaging: I have reviewed all pertinent imaging results/findings within the past 24 hours.       Scheduled Medications:   citalopram  20 mg Oral Daily    diazePAM  2 mg Oral Q12H    enoxaparin  40 mg Subcutaneous Q24H    folic acid  1 mg Oral Daily    multivitamin  1 tablet Oral Daily    ondansetron  4 mg Oral Q6H    sodium phosphate IVPB  39.99 mmol Intravenous Once    thiamine  100 mg Oral Daily       PRN Medications:  acetaminophen, albuterol-ipratropium, dextrose 50%, dextrose 50%, glucagon (human recombinant), glucose, glucose, LORazepam, melatonin, ondansetron, promethazine (PHENERGAN) IVPB, senna-docusate 8.6-50 mg, sodium chloride 0.9%    Review of patient's allergies indicates:  No Known  Allergies    Assessment/Plan:     Alcohol-induced anxiety disorder  No evidence of primary major depression, bipolar, schizophrenia, anxiety disorder.  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  Minimizes symptoms and doesn't need psychiatric medications at this time.  Symptoms should improve with sobriety so focus should be on inpatient rehab.  No need for acute inpatient psychiatric hospitalization at this time.    12/15/20 - Patient not showing any s/sx of anxiety or depression at this time.  Can continue citalopram 20mg daily but should consider tapering off as outpatient being that most of her symptoms are related to heavy alcohol use and not primary depression or anxiety.    Alcohol withdrawal  Patient with a long chronic history of alcohol use disorder with failed rehab and sobriety.  Current physical exam and labs indicate advanced disease process.  Needs sobriety for best outcome.  Attempted to  and educate but patient not wanting rehab at this time.  Have social work provide patient with local resources for continued sobriety.  Needs inpatient rehab.  Continue to monitor and treat withdrawal with MVI/thiamine/folic acid replacement.  Poor prognosis.  She also complains of something in her ear which may/may not be withdrawal related.  Physical exam should be done for further workup.    12/15/20 - Patient improved.  Needs resources for outpatient rehab from social work.  Not interested in rehab at this time.  Education patient that sobriety is a must in her future.         Need for Continued Hospitalization:  No need for inpatient psychiatric hospitalization. Continue medical care as per the primary team.    Anticipated Disposition:  Home or Self Care    Total time:  25 with greater than 50% of this time spent in counseling and/or coordination of care.       Cheo Snider MD   Psychiatry  [facility]

## 2020-12-15 NOTE — NURSING
Pt tolerated full liquid diet tray for breakfast and lunch. Pt states ok to try regular for dinner. Order placed for regular diet per orders.

## 2020-12-15 NOTE — ASSESSMENT & PLAN NOTE
Last drink 12/10  Tremors improved   Decrease diazepam to 2 mg q 12 hours   Lorazepam 2 mg q 4 hours PRN

## 2020-12-16 VITALS
OXYGEN SATURATION: 97 % | DIASTOLIC BLOOD PRESSURE: 75 MMHG | WEIGHT: 121.69 LBS | TEMPERATURE: 97 F | HEART RATE: 62 BPM | RESPIRATION RATE: 17 BRPM | BODY MASS INDEX: 22.39 KG/M2 | HEIGHT: 62 IN | SYSTOLIC BLOOD PRESSURE: 130 MMHG

## 2020-12-16 PROBLEM — E87.8 REFEEDING SYNDROME: Status: RESOLVED | Noted: 2020-12-13 | Resolved: 2020-12-16

## 2020-12-16 PROBLEM — F10.939 ALCOHOL WITHDRAWAL: Status: RESOLVED | Noted: 2020-12-11 | Resolved: 2020-12-16

## 2020-12-16 LAB
ALBUMIN SERPL BCP-MCNC: 3.2 G/DL (ref 3.5–5.2)
ALBUMIN SERPL BCP-MCNC: 3.3 G/DL (ref 3.5–5.2)
ALP SERPL-CCNC: 119 U/L (ref 55–135)
ALP SERPL-CCNC: 123 U/L (ref 55–135)
ALT SERPL W/O P-5'-P-CCNC: 36 U/L (ref 10–44)
ALT SERPL W/O P-5'-P-CCNC: 37 U/L (ref 10–44)
ANION GAP SERPL CALC-SCNC: 10 MMOL/L (ref 8–16)
ANION GAP SERPL CALC-SCNC: 10 MMOL/L (ref 8–16)
AST SERPL-CCNC: 88 U/L (ref 10–40)
AST SERPL-CCNC: 88 U/L (ref 10–40)
BILIRUB SERPL-MCNC: 1.1 MG/DL (ref 0.1–1)
BILIRUB SERPL-MCNC: 1.4 MG/DL (ref 0.1–1)
BUN SERPL-MCNC: 3 MG/DL (ref 6–20)
BUN SERPL-MCNC: 3 MG/DL (ref 6–20)
CALCIUM SERPL-MCNC: 8.4 MG/DL (ref 8.7–10.5)
CALCIUM SERPL-MCNC: 8.5 MG/DL (ref 8.7–10.5)
CHLORIDE SERPL-SCNC: 101 MMOL/L (ref 95–110)
CHLORIDE SERPL-SCNC: 102 MMOL/L (ref 95–110)
CO2 SERPL-SCNC: 25 MMOL/L (ref 23–29)
CO2 SERPL-SCNC: 27 MMOL/L (ref 23–29)
CREAT SERPL-MCNC: 0.6 MG/DL (ref 0.5–1.4)
CREAT SERPL-MCNC: 0.6 MG/DL (ref 0.5–1.4)
EST. GFR  (AFRICAN AMERICAN): >60 ML/MIN/1.73 M^2
EST. GFR  (AFRICAN AMERICAN): >60 ML/MIN/1.73 M^2
EST. GFR  (NON AFRICAN AMERICAN): >60 ML/MIN/1.73 M^2
EST. GFR  (NON AFRICAN AMERICAN): >60 ML/MIN/1.73 M^2
GLUCOSE SERPL-MCNC: 108 MG/DL (ref 70–110)
GLUCOSE SERPL-MCNC: 91 MG/DL (ref 70–110)
INR PPP: 1.1 (ref 0.8–1.2)
MAGNESIUM SERPL-MCNC: 1.5 MG/DL (ref 1.6–2.6)
MAGNESIUM SERPL-MCNC: 1.6 MG/DL (ref 1.6–2.6)
PHOSPHATE SERPL-MCNC: 3.1 MG/DL (ref 2.7–4.5)
PHOSPHATE SERPL-MCNC: 3.1 MG/DL (ref 2.7–4.5)
POTASSIUM SERPL-SCNC: 3.7 MMOL/L (ref 3.5–5.1)
POTASSIUM SERPL-SCNC: 3.9 MMOL/L (ref 3.5–5.1)
PROT SERPL-MCNC: 7 G/DL (ref 6–8.4)
PROT SERPL-MCNC: 7.3 G/DL (ref 6–8.4)
PROTHROMBIN TIME: 12.4 SEC (ref 9–12.5)
SODIUM SERPL-SCNC: 137 MMOL/L (ref 136–145)
SODIUM SERPL-SCNC: 138 MMOL/L (ref 136–145)

## 2020-12-16 PROCEDURE — 25000003 PHARM REV CODE 250: Performed by: EMERGENCY MEDICINE

## 2020-12-16 PROCEDURE — 84100 ASSAY OF PHOSPHORUS: CPT

## 2020-12-16 PROCEDURE — 80053 COMPREHEN METABOLIC PANEL: CPT

## 2020-12-16 PROCEDURE — 83735 ASSAY OF MAGNESIUM: CPT

## 2020-12-16 PROCEDURE — 85610 PROTHROMBIN TIME: CPT

## 2020-12-16 PROCEDURE — 25000003 PHARM REV CODE 250: Performed by: INTERNAL MEDICINE

## 2020-12-16 PROCEDURE — 36415 COLL VENOUS BLD VENIPUNCTURE: CPT

## 2020-12-16 RX ORDER — LANOLIN ALCOHOL/MO/W.PET/CERES
400 CREAM (GRAM) TOPICAL DAILY
Refills: 0 | COMMUNITY
Start: 2020-12-16 | End: 2021-02-15

## 2020-12-16 RX ORDER — ONDANSETRON 4 MG/1
4 TABLET, FILM COATED ORAL EVERY 6 HOURS
Qty: 12 TABLET | Refills: 0 | Status: SHIPPED | OUTPATIENT
Start: 2020-12-16 | End: 2022-08-08 | Stop reason: SDUPTHER

## 2020-12-16 RX ORDER — POTASSIUM CHLORIDE 750 MG/1
10 TABLET, EXTENDED RELEASE ORAL DAILY
Qty: 30 TABLET | Refills: 0 | Status: SHIPPED | OUTPATIENT
Start: 2020-12-16 | End: 2020-12-21

## 2020-12-16 RX ORDER — POTASSIUM CHLORIDE 750 MG/1
10 TABLET, EXTENDED RELEASE ORAL DAILY
Qty: 30 TABLET | Refills: 0 | Status: SHIPPED | OUTPATIENT
Start: 2020-12-16 | End: 2020-12-16 | Stop reason: SDUPTHER

## 2020-12-16 RX ORDER — LANOLIN ALCOHOL/MO/W.PET/CERES
100 CREAM (GRAM) TOPICAL DAILY
Qty: 90 TABLET | Refills: 0 | Status: SHIPPED | OUTPATIENT
Start: 2020-12-17 | End: 2024-02-14

## 2020-12-16 RX ORDER — FOLIC ACID 1 MG/1
1 TABLET ORAL DAILY
Qty: 90 TABLET | Refills: 3 | Status: SHIPPED | OUTPATIENT
Start: 2020-12-17 | End: 2021-02-15

## 2020-12-16 RX ORDER — LANOLIN ALCOHOL/MO/W.PET/CERES
400 CREAM (GRAM) TOPICAL 2 TIMES DAILY
Status: DISCONTINUED | OUTPATIENT
Start: 2020-12-16 | End: 2020-12-16 | Stop reason: HOSPADM

## 2020-12-16 RX ADMIN — CITALOPRAM HYDROBROMIDE 20 MG: 20 TABLET ORAL at 09:12

## 2020-12-16 RX ADMIN — ONDANSETRON HYDROCHLORIDE 4 MG: 4 TABLET, FILM COATED ORAL at 05:12

## 2020-12-16 RX ADMIN — FOLIC ACID 1 MG: 1 TABLET ORAL at 10:12

## 2020-12-16 RX ADMIN — ACETAMINOPHEN 500 MG: 500 TABLET ORAL at 10:12

## 2020-12-16 RX ADMIN — THIAMINE HCL TAB 100 MG 100 MG: 100 TAB at 09:12

## 2020-12-16 RX ADMIN — MAGNESIUM OXIDE TAB 400 MG (241.3 MG ELEMENTAL MG) 400 MG: 400 (241.3 MG) TAB at 10:12

## 2020-12-16 RX ADMIN — ONDANSETRON HYDROCHLORIDE 4 MG: 4 TABLET, FILM COATED ORAL at 12:12

## 2020-12-16 RX ADMIN — DIAZEPAM 2 MG: 2 TABLET ORAL at 09:12

## 2020-12-16 RX ADMIN — THERA TABS 1 TABLET: TAB at 09:12

## 2020-12-16 NOTE — PLAN OF CARE
WRITTEN HEALTHCARE DISCHARGE INFORMATION     Things that YOU are RESPONSIBLE for to Manage Your Care At Home:    1. Getting your prescriptions filled.  2. Taking you medications as directed. DO NOT MISS ANY DOSES!  3. Going to your follow-up doctor appointments. This is important because it allows the doctor to monitor your progress and to determine if any changes need to be made to your treatment plan.    If you are unable to make your follow up appointments, please call the number listed and reschedule this appointment.     ____________HELP AT HOME____________________    Experiencing any SIGNS or SYMPTOMS: YOU CAN:     Schedule a same day appopintment with your Primary Care Doctor or  you can call [facility] Care Line for 24/7 assistance at [phone]     If you are experience any signs or symptoms that have become severe, Call 911 and come to your nearest Emergency Room.     You should receive a call from [facility] within 48-72 hours to help manage your care after discharge. Please try to make sure that you answer your phone for this important phone call.    Thank you for choosing [facility] and allowing us to care for you.   From your care management team: Gabbie Merlos LMSW, [phone]  Follow-up Information     [facility]. Schedule an appointment as soon as possible for a visit in 1 week.    Why: Establish Primary Care   Contact information:  [address] [place] [address]  [phone]

## 2020-12-16 NOTE — PLAN OF CARE
Problem: Fall Injury Risk  Goal: Absence of Fall and Fall-Related Injury  Outcome: Ongoing, Progressing     Problem: Adult Inpatient Plan of Care  Goal: Plan of Care Review  Outcome: Ongoing, Progressing  Goal: Patient-Specific Goal (Individualization)  Outcome: Ongoing, Progressing  Goal: Absence of Hospital-Acquired Illness or Injury  Outcome: Ongoing, Progressing  Goal: Optimal Comfort and Wellbeing  Outcome: Ongoing, Progressing  Goal: Readiness for Transition of Care  Outcome: Ongoing, Progressing  Goal: Rounds/Family Conference  Outcome: Ongoing, Progressing     Problem: Infection  Goal: Infection Symptom Resolution  Outcome: Ongoing, Progressing     Problem: Skin Injury Risk Increased  Goal: Skin Health and Integrity  Outcome: Ongoing, Progressing   Patient AAOX4. NAD noted. No falls or injuries noted during this shift. Will continue to monitor.

## 2020-12-16 NOTE — PLAN OF CARE
Problem: Fall Injury Risk  Goal: Absence of Fall and Fall-Related Injury  Outcome: Ongoing, Progressing     Problem: Adult Inpatient Plan of Care  Goal: Plan of Care Review  Outcome: Ongoing, Progressing  Goal: Patient-Specific Goal (Individualization)  Outcome: Ongoing, Progressing  Goal: Absence of Hospital-Acquired Illness or Injury  Outcome: Ongoing, Progressing  Goal: Optimal Comfort and Wellbeing  Outcome: Ongoing, Progressing  Goal: Readiness for Transition of Care  Outcome: Ongoing, Progressing  Goal: Rounds/Family Conference  Outcome: Ongoing, Progressing     Problem: Infection  Goal: Infection Symptom Resolution  Outcome: Ongoing, Progressing   No falls, no c/o pain. No c/o nausea. Pt tolerating some solid food. VSS, no signs withdrawal. BM yesterday. IVF DCd

## 2020-12-16 NOTE — PLAN OF CARE
EDUCATION:  Patient After Visit Summary (AVS) updated to include educational information on alcohol abuse that will be printed on patient's AVS to review upon discharge; Information reviewed with patient that include: signs and symptoms to look for and call the doctor if experiencing, and symptoms that may indicate a medical emergency: CALL 911.            SW taught patient about things she is responsible for when discharged to help with her recovery:  Particularly on how to Manage her care at home:  1. Getting his prescriptions filled.  2. Taking his medications as directed. DO NOT MISS ANY DOSES!  3. Going to his follow-up doctor appointments.       Help at home will be from pt's friends, assisting in pt's recovery.     Follow-up appointments to be [facility] with [facility]; patient verbalized understanding     SW provided patient with alcohol abuse resources/rehabs. Information reviewed with patient.     Nurse, Emily, notified that all CM needs have been met.          12/16/20 1347   Final Note   Assessment Type Final Discharge Note   Anticipated Discharge Disposition Home   What phone number can be called within the next 1-3 days to see how you are doing after discharge? [phone]   Discharge plans and expectations educations in teach back method with documentation complete? Yes   Right Care Referral Info   Post Acute Recommendation No Care   Post-Acute Status   Discharge Delays None known at this time

## 2020-12-16 NOTE — NURSING
Report given to receiving nurse Leandra. Pt awake in bed. Walking rounds completed. Pt assessed, NAD noted.     24hr chart check completed.

## 2020-12-17 ENCOUNTER — PATIENT OUTREACH (OUTPATIENT)
Dept: ADMINISTRATIVE | Facility: CLINIC | Age: 45
End: 2020-12-17

## 2020-12-17 NOTE — PROGRESS NOTES
C3 nurse attempted to contact patient. No answer. The following message was left for the patient to return the call:  Good afternoon. I am a nurse calling on behalf of [facility] from the Care Coordination Center.  This is a Transitional Care Call for Carmen Landon. When you have a moment please contact us at [phone] or [phone] Monday through Friday, between the hours of 8 am to 4 pm. We look forward to speaking with you. On behalf of [facility] have a nice day.    The patient does not have a scheduled HOSFU appointment within 7-14 days post hospital discharge date 12/16/2020. Message sent to Physician staff to assist with HOSFU appointment scheduling.

## 2020-12-17 NOTE — HOSPITAL COURSE
Admitted for alcohol intoxication and alcohol withdrawal.  On presentation patient had  anxiousness, bilateral lip numbness, and dysphoric mood. labs on arrival were remarkable with K+ 2.2, elevated AST//75. Drug screen with THC positive. ETOH > 450.  Psychiatric consult placed for evaluation of symptoms at presentation. Electrolytes replaced as needed. Patient was started on diazepam 5 mg TID + lorazepam 2 mg q 4 hours PRN.  Patient improved over the course of hospital stay.   The patient at discharge did not want to return to drinking but does not want to go to rehab.

## 2020-12-19 NOTE — DISCHARGE SUMMARY
[facility] Hospital Medicine  Discharge Summary      Patient Name: Carmen Landon  MRN: [id number]  Patient Class: IP- Inpatient  Admission Date: 12/11/2020  Hospital Length of Stay: 5 days  Discharge Date and Time: 12/16/2020  3:56 PM  Attending Physician: No att. providers found   Discharging Provider: Kian Brwon MD  Primary Care Provider: LIZETH Holcomb      HPI:    Carmen Landon is 44 y/o female with anxiety, depression, bipolar, and schizophrenia who presents to the hospital with complaining of anxiousness, bilateral lip numbness, and dysphoric mood. Per ED note, patient was tearful and stated she was sad but would not give any further details with EMS. Upon ED arrival, patient stated that she was here due to right ear pain. She endorsed large amount of alcohol usage last night. Denied any other symptoms including any hallucinations or intent to harm herself or others. During my exam, patient is very lethargic after given Ativan in ED. She is aroused briefly with tactile stimuli but falls right back to sleep. History is limited due to patient's mental status. Of note, patient was seen here on 10/8 of this year with labs indicative of alcohol abuse. Of note, the patient has been seen in the ED twice, on 6/29/17 and 7/2/14, also for alcohol intoxication. Recently seen here 3 weeks ago on 11/24/2020 for back pain.    In ED, COVID-19 negative. Labs remarkable with K+ 2.2, elevated AST//75. Drug screen with THC positive. ETOH > 450.          * No surgery found *      Hospital Course:   Admitted for alcohol intoxication and alcohol withdrawal.  On presentation patient had  anxiousness, bilateral lip numbness, and dysphoric mood. labs on arrival were remarkable with K+ 2.2, elevated AST//75. Drug screen with THC positive. ETOH > 450.  Psychiatric consult placed for evaluation of symptoms at presentation. Electrolytes replaced as needed. Patient was  started on diazepam 5 mg TID + lorazepam 2 mg q 4 hours PRN.  Patient improved over the course of hospital stay.   The patient at discharge did not want to return to drinking but does not want to go to rehab.      Consults:   Consults (From admission, onward)        Status Ordering Provider     Inpatient consult to Psychiatry  Once     Provider:  Cheo Snider MD    Completed AMIE DAMON     Inpatient consult to Registered Dietitian/Nutritionist  Once     Provider:  (Not yet assigned)    Completed NELSY MIX          * Refeeding syndrome-resolved as of 12/16/2020  Evidenced by hypokalemia, hypomagnesemia, hypophosphatemia   Pt with very poor nutritional status. Suspect mostly EtOH   Phos remains low  Replete PRN  Cont inpatient monitoring    Alcoholic hepatitis  No sign of acute liver failure  INR 1.1. No hepatic encephalopathy  AST/ALT cont to downtrend  No indication for steroid  Counseled extensively on EtOH cessation. She seems motivated.  Cont monitoring    Alcohol-induced anxiety disorder  Hx bipolar, schizophrenia, anxiety/depression. Presented with dysphoric mood with crying and expressing sadness, but denied SI or hallucinations.   Psych following - appreciate input  Patient will benefit from sobriety      Alcohol withdrawal-resolved as of 12/16/2020  Last drink 12/10  Tremors improved   Decrease diazepam to 2 mg q 12 hours   Lorazepam 2 mg q 4 hours PRN         Final Active Diagnoses:    Diagnosis Date Noted POA    Right ear pain [H92.01] 12/14/2020 Yes    Alcoholic hepatitis [K70.10] 12/12/2020 Yes    Alcohol-induced anxiety disorder [F10.980] 12/11/2020 Yes      Problems Resolved During this Admission:    Diagnosis Date Noted Date Resolved POA    PRINCIPAL PROBLEM:  Refeeding syndrome [E87.8] 12/13/2020 12/16/2020 No    Hypokalemia [E87.6] 12/11/2020 12/13/2020 Yes    Alcohol withdrawal [F10.239] 12/11/2020 12/16/2020 Yes    Transaminitis [R74.01] 12/11/2020 12/12/2020 Yes        Discharged Condition: stable    Disposition: Home or Self Care    Follow Up:  Follow-up Information     [facility]. Schedule an appointment as soon as possible for a visit in 1 week.    Why: Establish Primary Care   Contact information:  [address] [place] [address]  [phone]                 Patient Instructions:      Ambulatory referral/consult to Hepatology   Standing Status: Future   Referral Priority: Routine Referral Type: Consultation   Referral Reason: Specialty Services Required   Requested Specialty: Hepatology   Number of Visits Requested: 1     Activity as tolerated       Significant Diagnostic Studies: Labs: BMP: No results for input(s): GLU, NA, K, CL, CO2, BUN, CREATININE, CALCIUM, MG in the last 48 hours. and CBC No results for input(s): WBC, HGB, HCT, PLT in the last 48 hours.  Microbiology:   Blood Culture   Lab Results   Component Value Date    LABBLOO NO GROWTH AFTER 5 DAYS  - FINAL REPORT. 11/09/2011   , Sputum Culture No results found for: GSRESP, RESPIRATORYC and Urine Culture  No results found for: LABURIN    Pending Diagnostic Studies:     None         Medications:  Reconciled Home Medications:      Medication List      START taking these medications    folic acid 1 MG tablet  Commonly known as: FOLVITE  Take 1 tablet (1 mg total) by mouth once daily.     magnesium oxide 400 mg (241.3 mg magnesium) tablet  Commonly known as: MAG-OX  Take 1 tablet (400 mg total) by mouth once daily.     multivitamin Tab  Take 1 tablet by mouth once daily.     potassium chloride 10 MEQ Tbsr  Commonly known as: KLOR-CON  Take 1 tablet (10 mEq total) by mouth once daily. for 5 days     thiamine 100 MG tablet  Take 1 tablet (100 mg total) by mouth once daily.        CONTINUE taking these medications    citalopram 20 MG tablet  Commonly known as: CELEXA  Take 1 tablet (20 mg total) by mouth once daily.     ondansetron 4 MG tablet  Commonly known as: ZOFRAN  Take 1 tablet (4 mg total)  by mouth every 6 (six) hours.        STOP taking these medications    methylPREDNISolone 4 mg tablet  Commonly known as: MEDROL DOSEPACK            Indwelling Lines/Drains at time of discharge:   Lines/Drains/Airways     None                 Time spent on the discharge of patient: 60 minutes  Patient was seen and examined on the date of discharge and determined to be suitable for discharge.         Kian Brown MD  Department of Hospital Medicine  [facility]

## 2020-12-29 ENCOUNTER — HOSPITAL ENCOUNTER (EMERGENCY)
Facility: HOSPITAL | Age: 45
Discharge: HOME | End: 2020-12-29
Attending: EMERGENCY MEDICINE
Payer: MEDICAID

## 2020-12-29 VITALS
OXYGEN SATURATION: 99 % | SYSTOLIC BLOOD PRESSURE: 168 MMHG | DIASTOLIC BLOOD PRESSURE: 90 MMHG | RESPIRATION RATE: 20 BRPM | TEMPERATURE: 98 F | HEART RATE: 123 BPM

## 2020-12-29 DIAGNOSIS — R10.9 ABDOMINAL PAIN, UNSPECIFIED ABDOMINAL LOCATION: Primary | ICD-10-CM

## 2020-12-29 PROCEDURE — 99282 EMERGENCY DEPT VISIT SF MDM: CPT | Mod: ,,, | Performed by: PHYSICIAN ASSISTANT

## 2020-12-29 PROCEDURE — 99283 EMERGENCY DEPT VISIT LOW MDM: CPT

## 2020-12-29 PROCEDURE — 99282 PR EMERGENCY DEPT VISIT,LEVEL II: ICD-10-PCS | Mod: ,,, | Performed by: PHYSICIAN ASSISTANT

## 2020-12-30 NOTE — ED PROVIDER NOTES
Encounter Date: 12/29/2020       History     Chief Complaint   Patient presents with    Flank Pain     R flank pain x 4 days, +ETOH, hx drug abuse, denies using today     45-year-old female presents to the ER for evaluation of abdominal discomfort.  Patient reports chronic alcoholism.  She reports abdominal pain on the right side after she eats.  The patient is concerned about her liver and her gallbladder.  She states that she has had this pain for quite some time but she does not believe is it is her liver despite her alcoholism.  The patient is concerned about her gallbladder.  She endorses nausea and intermittent vomiting.  She endorses alcohol use tonight.  Upon my initial assessment of the patient in the hallway she states that she wants to leave.  Patient is concerned about other people in front of her that appear to be more ill.  The patient appears to be of clear in sound mind.  She is awake alert and oriented with appropriate mentation.  She was initially tachycardic with her presenting vitals but this has since resolved.  Her physical exam does reveal some right-sided abdominal tenderness, nonacute abdomen without peritoneal signs.  She appears to have mental capacity make her own medical decisions.        Review of patient's allergies indicates:  No Known Allergies  Past Medical History:   Diagnosis Date    Addiction to drug     Alcohol abuse     Allergy     Anemia     History of psychiatric hospitalization     alcohol related    Hx of psychiatric care     Meningitis     Psychiatric problem      Past Surgical History:   Procedure Laterality Date    TUBAL LIGATION       Family History   Problem Relation Age of Onset    Alcohol abuse Mother     Kidney disease Mother     Heart disease Father      Social History     Tobacco Use    Smoking status: Current Every Day Smoker     Packs/day: 0.50     Types: Cigarettes    Smokeless tobacco: Never Used   Substance Use Topics    Alcohol use: Yes      Comment: 1 pint whiskey daily and additional beers    Drug use: Yes     Types: Marijuana     Comment: history of heroin and cocaine     Review of Systems   Constitutional: Negative for fever.   HENT: Negative for sore throat.    Respiratory: Negative for shortness of breath.    Cardiovascular: Negative for chest pain.   Gastrointestinal: Positive for abdominal pain, nausea and vomiting.   Genitourinary: Negative for dysuria.   Musculoskeletal: Negative for back pain.   Skin: Negative for rash.   Neurological: Negative for weakness.   Hematological: Does not bruise/bleed easily.       Physical Exam     Initial Vitals [12/29/20 2111]   BP Pulse Resp Temp SpO2   (!) 168/90 (!) 123 20 98.1 °F (36.7 °C) 99 %      MAP       --         Physical Exam    Constitutional: Vital signs are normal. She appears well-developed and well-nourished.   HENT:   Head: Normocephalic and atraumatic.   Right Ear: Hearing normal.   Left Ear: Hearing normal.   Eyes: Conjunctivae are normal.   Cardiovascular: Normal rate and regular rhythm.   Abdominal: Soft. Normal appearance and bowel sounds are normal. There is abdominal tenderness.   Musculoskeletal: Normal range of motion.   Neurological: She is alert and oriented to person, place, and time.   Skin: Skin is warm and intact.   Psychiatric: She has a normal mood and affect. Her speech is normal and behavior is normal. Cognition and memory are normal.         ED Course   Procedures  Labs Reviewed - No data to display       Imaging Results    None          Medical Decision Making:   History:   Old Medical Records: I decided to obtain old medical records.  Initial Assessment:   45-year-old female presenting with right-sided abdominal pain  Differential Diagnosis:   Cholelithiasis, choledocholithiasis, cholecystitis, liver disease, abnormal LFTs, gastritis, enteritis, pancreatitis  ED Management:  Plan:  Following my initial assessment the patient the patient decided to leave.  The patient  stated that there are other people that are more sick than she is and she will come back another time.  I strongly advised the patient stay in the ER to complete a medical workup in that she was not taking the place of someone else and that we were going to take great care for.  Reluctantly the patient still decided to leave.  She appears to be of clear and sound mind and able to make her own medical decisions.  Her vital signs have normalized and her abdominal exam did reveal right-sided abdominal tenderness but without an acute abdomen.  The patient was strongly encouraged to come back to the ER at any time for further medical evaluation.                             Clinical Impression:     ICD-10-CM ICD-9-CM   1. Abdominal pain, unspecified abdominal location  R10.9 789.00                      Disposition:   Disposition: Discharged  Condition: Stable     ED Disposition Condition    Discharge Stable        ED Prescriptions     None        Follow-up Information    None                                      Jose Juan Griffin PA-C  12/29/20 2146

## 2020-12-30 NOTE — ED NOTES
Pt states she does not want to be seen in ER any longer. MOHAMUD Hudson talked with patient, per PA pt okay to leave, pt still does not want to be seen. Pts IV d/c'd and pt ambulated out of ER with a nurse

## 2021-01-15 ENCOUNTER — DOCUMENTATION ONLY (OUTPATIENT)
Dept: TRANSPLANT | Facility: CLINIC | Age: 46
End: 2021-01-15

## 2021-01-20 ENCOUNTER — TELEPHONE (OUTPATIENT)
Dept: HEPATOLOGY | Facility: CLINIC | Age: 46
End: 2021-01-20

## 2021-01-29 ENCOUNTER — LAB VISIT (OUTPATIENT)
Dept: LAB | Facility: HOSPITAL | Age: 46
End: 2021-01-29
Payer: MEDICAID

## 2021-01-29 ENCOUNTER — OFFICE VISIT (OUTPATIENT)
Dept: HEPATOLOGY | Facility: CLINIC | Age: 46
End: 2021-01-29
Payer: MEDICAID

## 2021-01-29 VITALS
HEIGHT: 62 IN | TEMPERATURE: 97 F | HEART RATE: 94 BPM | SYSTOLIC BLOOD PRESSURE: 155 MMHG | DIASTOLIC BLOOD PRESSURE: 99 MMHG | BODY MASS INDEX: 22.71 KG/M2 | RESPIRATION RATE: 18 BRPM | OXYGEN SATURATION: 95 % | WEIGHT: 123.44 LBS

## 2021-01-29 DIAGNOSIS — R76.8 POSITIVE HEPATITIS C ANTIBODY TEST: ICD-10-CM

## 2021-01-29 DIAGNOSIS — K70.9 ALCOHOLIC LIVER DISEASE: ICD-10-CM

## 2021-01-29 DIAGNOSIS — R74.01 TRANSAMINITIS: ICD-10-CM

## 2021-01-29 DIAGNOSIS — K70.9 ALCOHOLIC LIVER DISEASE: Primary | ICD-10-CM

## 2021-01-29 DIAGNOSIS — K76.89 HEPATIC CYST: ICD-10-CM

## 2021-01-29 DIAGNOSIS — Z87.898 HISTORY OF INTRAVENOUS DRUG USE IN REMISSION: ICD-10-CM

## 2021-01-29 DIAGNOSIS — D73.4 SPLENIC CYST: ICD-10-CM

## 2021-01-29 PROBLEM — F19.91 HISTORY OF INTRAVENOUS DRUG USE IN REMISSION: Status: ACTIVE | Noted: 2021-01-29

## 2021-01-29 LAB
A1AT SERPL-MCNC: 263 MG/DL (ref 100–190)
AFP SERPL-MCNC: 11 NG/ML (ref 0–8.4)
ALBUMIN SERPL BCP-MCNC: 3.5 G/DL (ref 3.5–5.2)
ALP SERPL-CCNC: 134 U/L (ref 55–135)
ALT SERPL W/O P-5'-P-CCNC: 79 U/L (ref 10–44)
ANION GAP SERPL CALC-SCNC: 11 MMOL/L (ref 8–16)
AST SERPL-CCNC: 255 U/L (ref 10–40)
BASOPHILS # BLD AUTO: 0.11 K/UL (ref 0–0.2)
BASOPHILS NFR BLD: 1.8 % (ref 0–1.9)
BILIRUB SERPL-MCNC: 0.8 MG/DL (ref 0.1–1)
BUN SERPL-MCNC: 5 MG/DL (ref 6–20)
CALCIUM SERPL-MCNC: 8.3 MG/DL (ref 8.7–10.5)
CERULOPLASMIN SERPL-MCNC: 40 MG/DL (ref 15–45)
CHLORIDE SERPL-SCNC: 105 MMOL/L (ref 95–110)
CK SERPL-CCNC: 55 U/L (ref 20–180)
CO2 SERPL-SCNC: 27 MMOL/L (ref 23–29)
CREAT SERPL-MCNC: 0.6 MG/DL (ref 0.5–1.4)
DIFFERENTIAL METHOD: ABNORMAL
EOSINOPHIL # BLD AUTO: 0.1 K/UL (ref 0–0.5)
EOSINOPHIL NFR BLD: 1.5 % (ref 0–8)
ERYTHROCYTE [DISTWIDTH] IN BLOOD BY AUTOMATED COUNT: 13.1 % (ref 11.5–14.5)
EST. GFR  (AFRICAN AMERICAN): >60 ML/MIN/1.73 M^2
EST. GFR  (NON AFRICAN AMERICAN): >60 ML/MIN/1.73 M^2
FERRITIN SERPL-MCNC: 70 NG/ML (ref 20–300)
GLUCOSE SERPL-MCNC: 88 MG/DL (ref 70–110)
HCT VFR BLD AUTO: 35.5 % (ref 37–48.5)
HGB BLD-MCNC: 11.6 G/DL (ref 12–16)
IGG SERPL-MCNC: 1552 MG/DL (ref 650–1600)
IMM GRANULOCYTES # BLD AUTO: 0.02 K/UL (ref 0–0.04)
IMM GRANULOCYTES NFR BLD AUTO: 0.3 % (ref 0–0.5)
INR PPP: 1.1 (ref 0.8–1.2)
IRON SERPL-MCNC: 30 UG/DL (ref 30–160)
LYMPHOCYTES # BLD AUTO: 2 K/UL (ref 1–4.8)
LYMPHOCYTES NFR BLD: 32.2 % (ref 18–48)
MCH RBC QN AUTO: 34.7 PG (ref 27–31)
MCHC RBC AUTO-ENTMCNC: 32.7 G/DL (ref 32–36)
MCV RBC AUTO: 106 FL (ref 82–98)
MONOCYTES # BLD AUTO: 0.6 K/UL (ref 0.3–1)
MONOCYTES NFR BLD: 9 % (ref 4–15)
NEUTROPHILS # BLD AUTO: 3.4 K/UL (ref 1.8–7.7)
NEUTROPHILS NFR BLD: 55.2 % (ref 38–73)
NRBC BLD-RTO: 0 /100 WBC
PLATELET # BLD AUTO: 229 K/UL (ref 150–350)
PMV BLD AUTO: 9.6 FL (ref 9.2–12.9)
POTASSIUM SERPL-SCNC: 3.6 MMOL/L (ref 3.5–5.1)
PROT SERPL-MCNC: 8.3 G/DL (ref 6–8.4)
PROTHROMBIN TIME: 11.7 SEC (ref 9–12.5)
RBC # BLD AUTO: 3.34 M/UL (ref 4–5.4)
SATURATED IRON: 5 % (ref 20–50)
SODIUM SERPL-SCNC: 143 MMOL/L (ref 136–145)
TOTAL IRON BINDING CAPACITY: 617 UG/DL (ref 250–450)
TRANSFERRIN SERPL-MCNC: 417 MG/DL (ref 200–375)
WBC # BLD AUTO: 6.08 K/UL (ref 3.9–12.7)

## 2021-01-29 PROCEDURE — 86706 HEP B SURFACE ANTIBODY: CPT

## 2021-01-29 PROCEDURE — 83540 ASSAY OF IRON: CPT

## 2021-01-29 PROCEDURE — 99204 PR OFFICE/OUTPT VISIT, NEW, LEVL IV, 45-59 MIN: ICD-10-PCS | Mod: S$PBB,,, | Performed by: NURSE PRACTITIONER

## 2021-01-29 PROCEDURE — 82784 ASSAY IGA/IGD/IGG/IGM EACH: CPT

## 2021-01-29 PROCEDURE — 86790 VIRUS ANTIBODY NOS: CPT

## 2021-01-29 PROCEDURE — 99215 OFFICE O/P EST HI 40 MIN: CPT | Mod: PBBFAC | Performed by: NURSE PRACTITIONER

## 2021-01-29 PROCEDURE — 85025 COMPLETE CBC W/AUTO DIFF WBC: CPT

## 2021-01-29 PROCEDURE — 86235 NUCLEAR ANTIGEN ANTIBODY: CPT

## 2021-01-29 PROCEDURE — 99999 PR PBB SHADOW E&M-EST. PATIENT-LVL V: ICD-10-PCS | Mod: PBBFAC,,, | Performed by: NURSE PRACTITIONER

## 2021-01-29 PROCEDURE — 82390 ASSAY OF CERULOPLASMIN: CPT

## 2021-01-29 PROCEDURE — 87902 NFCT AGT GNTYP ALYS HEP C: CPT

## 2021-01-29 PROCEDURE — 86038 ANTINUCLEAR ANTIBODIES: CPT

## 2021-01-29 PROCEDURE — 99204 OFFICE O/P NEW MOD 45 MIN: CPT | Mod: S$PBB,,, | Performed by: NURSE PRACTITIONER

## 2021-01-29 PROCEDURE — 80053 COMPREHEN METABOLIC PANEL: CPT

## 2021-01-29 PROCEDURE — 86704 HEP B CORE ANTIBODY TOTAL: CPT

## 2021-01-29 PROCEDURE — 80321 ALCOHOLS BIOMARKERS 1OR 2: CPT

## 2021-01-29 PROCEDURE — 85610 PROTHROMBIN TIME: CPT

## 2021-01-29 PROCEDURE — 36415 COLL VENOUS BLD VENIPUNCTURE: CPT

## 2021-01-29 PROCEDURE — 86256 FLUORESCENT ANTIBODY TITER: CPT | Mod: 91

## 2021-01-29 PROCEDURE — 82103 ALPHA-1-ANTITRYPSIN TOTAL: CPT

## 2021-01-29 PROCEDURE — 82728 ASSAY OF FERRITIN: CPT

## 2021-01-29 PROCEDURE — 82550 ASSAY OF CK (CPK): CPT

## 2021-01-29 PROCEDURE — 82105 ALPHA-FETOPROTEIN SERUM: CPT

## 2021-01-29 PROCEDURE — 99999 PR PBB SHADOW E&M-EST. PATIENT-LVL V: CPT | Mod: PBBFAC,,, | Performed by: NURSE PRACTITIONER

## 2021-02-01 LAB
ANA SER QL IF: NORMAL
HBV CORE AB SERPL QL IA: POSITIVE
HBV SURFACE AB SER-ACNC: POSITIVE M[IU]/ML
HEPATITIS A ANTIBODY, IGG: NEGATIVE

## 2021-02-02 LAB
MITOCHONDRIA AB TITR SER IF: NORMAL {TITER}
SMOOTH MUSCLE AB TITR SER IF: NORMAL {TITER}

## 2021-02-04 LAB
HCV GENTYP SERPL NAA+PROBE: ABNORMAL
HCV RNA SERPL NAA+PROBE-LOG IU: 6.06 LOG (10) IU/ML
HCV RNA SERPL QL NAA+PROBE: DETECTED
HCV RNA SPEC NAA+PROBE-ACNC: ABNORMAL IU/ML

## 2021-02-09 LAB — PHOSPHATIDYLETHANOL (PETH): 978 NG/ML

## 2021-02-12 ENCOUNTER — OFFICE VISIT (OUTPATIENT)
Dept: HEPATOLOGY | Facility: CLINIC | Age: 46
End: 2021-02-12
Payer: MEDICAID

## 2021-02-12 ENCOUNTER — LAB VISIT (OUTPATIENT)
Dept: LAB | Facility: HOSPITAL | Age: 46
End: 2021-02-12
Payer: MEDICAID

## 2021-02-12 ENCOUNTER — PROCEDURE VISIT (OUTPATIENT)
Dept: HEPATOLOGY | Facility: CLINIC | Age: 46
End: 2021-02-12
Payer: MEDICAID

## 2021-02-12 VITALS
OXYGEN SATURATION: 98 % | HEART RATE: 95 BPM | DIASTOLIC BLOOD PRESSURE: 90 MMHG | WEIGHT: 125 LBS | RESPIRATION RATE: 16 BRPM | SYSTOLIC BLOOD PRESSURE: 148 MMHG | BODY MASS INDEX: 23 KG/M2 | HEIGHT: 62 IN

## 2021-02-12 DIAGNOSIS — R11.2 NAUSEA AND VOMITING, INTRACTABILITY OF VOMITING NOT SPECIFIED, UNSPECIFIED VOMITING TYPE: ICD-10-CM

## 2021-02-12 DIAGNOSIS — K70.9 ALCOHOLIC LIVER DISEASE: ICD-10-CM

## 2021-02-12 DIAGNOSIS — R76.8 HEPATITIS B CORE ANTIBODY POSITIVE: ICD-10-CM

## 2021-02-12 DIAGNOSIS — K76.89 HEPATIC CYST: ICD-10-CM

## 2021-02-12 DIAGNOSIS — K76.82 HEPATIC ENCEPHALOPATHY: ICD-10-CM

## 2021-02-12 DIAGNOSIS — B18.2 CHRONIC HEPATITIS C WITHOUT HEPATIC COMA: ICD-10-CM

## 2021-02-12 DIAGNOSIS — R76.8 POSITIVE HEPATITIS C ANTIBODY TEST: ICD-10-CM

## 2021-02-12 DIAGNOSIS — D73.4 SPLENIC CYST: ICD-10-CM

## 2021-02-12 DIAGNOSIS — R74.01 TRANSAMINITIS: Primary | ICD-10-CM

## 2021-02-12 DIAGNOSIS — Z87.898 HISTORY OF INTRAVENOUS DRUG USE IN REMISSION: ICD-10-CM

## 2021-02-12 DIAGNOSIS — R77.2 ELEVATED AFP: ICD-10-CM

## 2021-02-12 DIAGNOSIS — K70.9 ALCOHOLIC LIVER DISEASE: Primary | ICD-10-CM

## 2021-02-12 LAB
AFP SERPL-MCNC: 8.2 NG/ML (ref 0–8.4)
ALBUMIN SERPL BCP-MCNC: 4.1 G/DL (ref 3.5–5.2)
ALP SERPL-CCNC: 138 U/L (ref 55–135)
ALT SERPL W/O P-5'-P-CCNC: 60 U/L (ref 10–44)
AMMONIA PLAS-SCNC: 36 UMOL/L (ref 10–50)
ANION GAP SERPL CALC-SCNC: 13 MMOL/L (ref 8–16)
AST SERPL-CCNC: 134 U/L (ref 10–40)
BASOPHILS # BLD AUTO: 0.08 K/UL (ref 0–0.2)
BASOPHILS NFR BLD: 1.1 % (ref 0–1.9)
BILIRUB SERPL-MCNC: 0.9 MG/DL (ref 0.1–1)
BUN SERPL-MCNC: 15 MG/DL (ref 6–20)
CALCIUM SERPL-MCNC: 10.3 MG/DL (ref 8.7–10.5)
CHLORIDE SERPL-SCNC: 99 MMOL/L (ref 95–110)
CO2 SERPL-SCNC: 25 MMOL/L (ref 23–29)
CREAT SERPL-MCNC: 0.6 MG/DL (ref 0.5–1.4)
DIFFERENTIAL METHOD: ABNORMAL
EOSINOPHIL # BLD AUTO: 0.1 K/UL (ref 0–0.5)
EOSINOPHIL NFR BLD: 0.8 % (ref 0–8)
ERYTHROCYTE [DISTWIDTH] IN BLOOD BY AUTOMATED COUNT: 12.8 % (ref 11.5–14.5)
EST. GFR  (AFRICAN AMERICAN): >60 ML/MIN/1.73 M^2
EST. GFR  (NON AFRICAN AMERICAN): >60 ML/MIN/1.73 M^2
GLUCOSE SERPL-MCNC: 99 MG/DL (ref 70–110)
HCT VFR BLD AUTO: 38.7 % (ref 37–48.5)
HGB BLD-MCNC: 12.4 G/DL (ref 12–16)
IMM GRANULOCYTES # BLD AUTO: 0.04 K/UL (ref 0–0.04)
IMM GRANULOCYTES NFR BLD AUTO: 0.6 % (ref 0–0.5)
INR PPP: 1 (ref 0.8–1.2)
LYMPHOCYTES # BLD AUTO: 1.7 K/UL (ref 1–4.8)
LYMPHOCYTES NFR BLD: 24.1 % (ref 18–48)
MCH RBC QN AUTO: 32.5 PG (ref 27–31)
MCHC RBC AUTO-ENTMCNC: 32 G/DL (ref 32–36)
MCV RBC AUTO: 102 FL (ref 82–98)
MONOCYTES # BLD AUTO: 0.7 K/UL (ref 0.3–1)
MONOCYTES NFR BLD: 9.6 % (ref 4–15)
NEUTROPHILS # BLD AUTO: 4.5 K/UL (ref 1.8–7.7)
NEUTROPHILS NFR BLD: 63.8 % (ref 38–73)
NRBC BLD-RTO: 0 /100 WBC
PLATELET # BLD AUTO: 264 K/UL (ref 150–350)
PMV BLD AUTO: 9.6 FL (ref 9.2–12.9)
POTASSIUM SERPL-SCNC: 4.2 MMOL/L (ref 3.5–5.1)
PROT SERPL-MCNC: 9.2 G/DL (ref 6–8.4)
PROTHROMBIN TIME: 11.4 SEC (ref 9–12.5)
RBC # BLD AUTO: 3.81 M/UL (ref 4–5.4)
SODIUM SERPL-SCNC: 137 MMOL/L (ref 136–145)
WBC # BLD AUTO: 7.06 K/UL (ref 3.9–12.7)

## 2021-02-12 PROCEDURE — 80053 COMPREHEN METABOLIC PANEL: CPT

## 2021-02-12 PROCEDURE — 99999 PR PBB SHADOW E&M-EST. PATIENT-LVL IV: CPT | Mod: PBBFAC,,, | Performed by: NURSE PRACTITIONER

## 2021-02-12 PROCEDURE — 85610 PROTHROMBIN TIME: CPT

## 2021-02-12 PROCEDURE — 85025 COMPLETE CBC W/AUTO DIFF WBC: CPT

## 2021-02-12 PROCEDURE — 82140 ASSAY OF AMMONIA: CPT

## 2021-02-12 PROCEDURE — 99214 PR OFFICE/OUTPT VISIT, EST, LEVL IV, 30-39 MIN: ICD-10-PCS | Mod: S$PBB,,, | Performed by: NURSE PRACTITIONER

## 2021-02-12 PROCEDURE — 91200 LIVER ELASTOGRAPHY: CPT | Mod: PBBFAC | Performed by: NURSE PRACTITIONER

## 2021-02-12 PROCEDURE — 91200 LIVER ELASTOGRAPHY: CPT | Mod: 26,S$PBB,, | Performed by: NURSE PRACTITIONER

## 2021-02-12 PROCEDURE — 91200 FIBROSCAN (VIBRATION CONTROLLED TRANSIENT ELASTOGRAPHY): ICD-10-PCS | Mod: 26,S$PBB,, | Performed by: NURSE PRACTITIONER

## 2021-02-12 PROCEDURE — 99214 OFFICE O/P EST MOD 30 MIN: CPT | Mod: S$PBB,,, | Performed by: NURSE PRACTITIONER

## 2021-02-12 PROCEDURE — 82105 ALPHA-FETOPROTEIN SERUM: CPT

## 2021-02-12 PROCEDURE — 80321 ALCOHOLS BIOMARKERS 1OR 2: CPT

## 2021-02-12 PROCEDURE — 99214 OFFICE O/P EST MOD 30 MIN: CPT | Mod: PBBFAC,25 | Performed by: NURSE PRACTITIONER

## 2021-02-12 PROCEDURE — 99999 PR PBB SHADOW E&M-EST. PATIENT-LVL IV: ICD-10-PCS | Mod: PBBFAC,,, | Performed by: NURSE PRACTITIONER

## 2021-02-12 PROCEDURE — 36415 COLL VENOUS BLD VENIPUNCTURE: CPT

## 2021-02-12 RX ORDER — ONDANSETRON 4 MG/1
4 TABLET, ORALLY DISINTEGRATING ORAL EVERY 6 HOURS PRN
Qty: 60 TABLET | Refills: 5 | Status: SHIPPED | OUTPATIENT
Start: 2021-02-12 | End: 2021-03-12 | Stop reason: SDUPTHER

## 2021-02-12 RX ORDER — LACTULOSE 10 G/15ML
20 SOLUTION ORAL 2 TIMES DAILY
Qty: 5000 ML | Refills: 6 | Status: SHIPPED | OUTPATIENT
Start: 2021-02-12 | End: 2022-08-08 | Stop reason: SDUPTHER

## 2021-02-17 ENCOUNTER — TELEPHONE (OUTPATIENT)
Dept: HEPATOLOGY | Facility: CLINIC | Age: 46
End: 2021-02-17

## 2021-02-25 LAB — PHOSPHATIDYLETHANOL (PETH): 954 NG/ML

## 2021-03-12 ENCOUNTER — SPECIALTY PHARMACY (OUTPATIENT)
Dept: PHARMACY | Facility: CLINIC | Age: 46
End: 2021-03-12

## 2021-03-12 ENCOUNTER — TELEPHONE (OUTPATIENT)
Dept: HEPATOLOGY | Facility: CLINIC | Age: 46
End: 2021-03-12

## 2021-03-12 ENCOUNTER — HOSPITAL ENCOUNTER (OUTPATIENT)
Dept: RADIOLOGY | Facility: HOSPITAL | Age: 46
Discharge: HOME | End: 2021-03-12
Attending: NURSE PRACTITIONER
Payer: MEDICAID

## 2021-03-12 ENCOUNTER — PATIENT MESSAGE (OUTPATIENT)
Dept: HEPATOLOGY | Facility: CLINIC | Age: 46
End: 2021-03-12

## 2021-03-12 ENCOUNTER — OFFICE VISIT (OUTPATIENT)
Dept: HEPATOLOGY | Facility: CLINIC | Age: 46
End: 2021-03-12
Payer: MEDICAID

## 2021-03-12 VITALS
WEIGHT: 129.44 LBS | TEMPERATURE: 97 F | SYSTOLIC BLOOD PRESSURE: 115 MMHG | HEIGHT: 62 IN | BODY MASS INDEX: 23.82 KG/M2 | HEART RATE: 82 BPM | RESPIRATION RATE: 16 BRPM | DIASTOLIC BLOOD PRESSURE: 72 MMHG

## 2021-03-12 DIAGNOSIS — K70.9 ALCOHOLIC LIVER DISEASE: ICD-10-CM

## 2021-03-12 DIAGNOSIS — K76.82 HEPATIC ENCEPHALOPATHY: ICD-10-CM

## 2021-03-12 DIAGNOSIS — R11.2 NAUSEA AND VOMITING, INTRACTABILITY OF VOMITING NOT SPECIFIED, UNSPECIFIED VOMITING TYPE: ICD-10-CM

## 2021-03-12 DIAGNOSIS — R74.8 ABNORMAL TRANSAMINASES: ICD-10-CM

## 2021-03-12 DIAGNOSIS — R77.2 ELEVATED AFP: ICD-10-CM

## 2021-03-12 DIAGNOSIS — K74.60 HEPATIC CIRRHOSIS, UNSPECIFIED HEPATIC CIRRHOSIS TYPE, UNSPECIFIED WHETHER ASCITES PRESENT: Primary | ICD-10-CM

## 2021-03-12 DIAGNOSIS — R76.8 HEPATITIS B CORE ANTIBODY POSITIVE: ICD-10-CM

## 2021-03-12 DIAGNOSIS — D73.89 SPLENIC LESION: ICD-10-CM

## 2021-03-12 DIAGNOSIS — B18.2 CHRONIC HEPATITIS C WITHOUT HEPATIC COMA: ICD-10-CM

## 2021-03-12 DIAGNOSIS — K76.89 HEPATIC CYST: ICD-10-CM

## 2021-03-12 DIAGNOSIS — Z87.898 HISTORY OF INTRAVENOUS DRUG USE IN REMISSION: ICD-10-CM

## 2021-03-12 DIAGNOSIS — Z78.9 ALCOHOL USE: ICD-10-CM

## 2021-03-12 DIAGNOSIS — D73.4 SPLENIC CYST: ICD-10-CM

## 2021-03-12 PROCEDURE — 25500020 PHARM REV CODE 255: Performed by: NURSE PRACTITIONER

## 2021-03-12 PROCEDURE — 74183 MRI ABD W/O CNTR FLWD CNTR: CPT | Mod: 26,,, | Performed by: RADIOLOGY

## 2021-03-12 PROCEDURE — 99213 OFFICE O/P EST LOW 20 MIN: CPT | Mod: PBBFAC,25 | Performed by: PHYSICIAN ASSISTANT

## 2021-03-12 PROCEDURE — 99214 PR OFFICE/OUTPT VISIT, EST, LEVL IV, 30-39 MIN: ICD-10-PCS | Mod: S$PBB,,, | Performed by: PHYSICIAN ASSISTANT

## 2021-03-12 PROCEDURE — 99999 PR PBB SHADOW E&M-EST. PATIENT-LVL III: CPT | Mod: PBBFAC,,, | Performed by: PHYSICIAN ASSISTANT

## 2021-03-12 PROCEDURE — 74183 MRI ABDOMEN W WO CONTRAST: ICD-10-PCS | Mod: 26,,, | Performed by: RADIOLOGY

## 2021-03-12 PROCEDURE — 74183 MRI ABD W/O CNTR FLWD CNTR: CPT | Mod: TC

## 2021-03-12 PROCEDURE — 99214 OFFICE O/P EST MOD 30 MIN: CPT | Mod: S$PBB,,, | Performed by: PHYSICIAN ASSISTANT

## 2021-03-12 PROCEDURE — 99999 PR PBB SHADOW E&M-EST. PATIENT-LVL III: ICD-10-PCS | Mod: PBBFAC,,, | Performed by: PHYSICIAN ASSISTANT

## 2021-03-12 PROCEDURE — A9585 GADOBUTROL INJECTION: HCPCS | Performed by: NURSE PRACTITIONER

## 2021-03-12 RX ORDER — GADOBUTROL 604.72 MG/ML
10 INJECTION INTRAVENOUS
Status: COMPLETED | OUTPATIENT
Start: 2021-03-12 | End: 2021-03-12

## 2021-03-12 RX ORDER — LACTULOSE 10 G/15ML
SOLUTION ORAL; RECTAL
COMMUNITY
Start: 2021-03-11 | End: 2021-03-19

## 2021-03-12 RX ORDER — ASCORBIC ACID 1000 MG
175 TABLET ORAL DAILY
COMMUNITY
End: 2023-03-23

## 2021-03-12 RX ORDER — VELPATASVIR AND SOFOSBUVIR 100; 400 MG/1; MG/1
1 TABLET, FILM COATED ORAL DAILY
Qty: 28 TABLET | Refills: 5 | Status: SHIPPED | OUTPATIENT
Start: 2021-03-12 | End: 2021-09-20 | Stop reason: ALTCHOICE

## 2021-03-12 RX ORDER — ASPIRIN 81 MG/1
81 TABLET ORAL DAILY
COMMUNITY
End: 2023-03-23

## 2021-03-12 RX ADMIN — GADOBUTROL 10 ML: 604.72 INJECTION INTRAVENOUS at 01:03

## 2021-03-15 ENCOUNTER — PATIENT MESSAGE (OUTPATIENT)
Dept: HEPATOLOGY | Facility: CLINIC | Age: 46
End: 2021-03-15

## 2021-03-17 ENCOUNTER — IMMUNIZATION (OUTPATIENT)
Dept: PRIMARY CARE CLINIC | Facility: CLINIC | Age: 46
End: 2021-03-17
Payer: MEDICAID

## 2021-03-17 DIAGNOSIS — Z23 NEED FOR VACCINATION: Primary | ICD-10-CM

## 2021-03-17 PROCEDURE — 0001A COVID-19, MRNA, LNP-S, PF, 30 MCG/0.3 ML DOSE VACCINE: ICD-10-PCS | Mod: CV19,S$GLB,, | Performed by: FAMILY MEDICINE

## 2021-03-17 PROCEDURE — 0001A COVID-19, MRNA, LNP-S, PF, 30 MCG/0.3 ML DOSE VACCINE: CPT | Mod: CV19,S$GLB,, | Performed by: FAMILY MEDICINE

## 2021-03-17 PROCEDURE — 91300 COVID-19, MRNA, LNP-S, PF, 30 MCG/0.3 ML DOSE VACCINE: ICD-10-PCS | Mod: S$GLB,,, | Performed by: FAMILY MEDICINE

## 2021-03-17 PROCEDURE — 91300 COVID-19, MRNA, LNP-S, PF, 30 MCG/0.3 ML DOSE VACCINE: CPT | Mod: S$GLB,,, | Performed by: FAMILY MEDICINE

## 2021-03-18 ENCOUNTER — OFFICE VISIT (OUTPATIENT)
Dept: URGENT CARE | Facility: CLINIC | Age: 46
End: 2021-03-18
Payer: MEDICAID

## 2021-03-18 VITALS
HEIGHT: 62 IN | WEIGHT: 126 LBS | TEMPERATURE: 99 F | BODY MASS INDEX: 23.19 KG/M2 | SYSTOLIC BLOOD PRESSURE: 126 MMHG | DIASTOLIC BLOOD PRESSURE: 81 MMHG | OXYGEN SATURATION: 99 % | HEART RATE: 82 BPM

## 2021-03-18 DIAGNOSIS — H00.011 HORDEOLUM EXTERNUM OF RIGHT UPPER EYELID: Primary | ICD-10-CM

## 2021-03-18 PROCEDURE — 99214 OFFICE O/P EST MOD 30 MIN: CPT | Mod: S$GLB,,, | Performed by: NURSE PRACTITIONER

## 2021-03-18 PROCEDURE — 99214 PR OFFICE/OUTPT VISIT, EST, LEVL IV, 30-39 MIN: ICD-10-PCS | Mod: S$GLB,,, | Performed by: NURSE PRACTITIONER

## 2021-03-18 RX ORDER — SULFAMETHOXAZOLE AND TRIMETHOPRIM 800; 160 MG/1; MG/1
1 TABLET ORAL 2 TIMES DAILY
Qty: 14 TABLET | Refills: 0 | Status: SHIPPED | OUTPATIENT
Start: 2021-03-18 | End: 2021-03-25

## 2021-03-18 RX ORDER — ERYTHROMYCIN 5 MG/G
OINTMENT OPHTHALMIC 3 TIMES DAILY
Qty: 3.5 G | Refills: 0 | Status: SHIPPED | OUTPATIENT
Start: 2021-03-18 | End: 2023-03-23

## 2021-03-19 ENCOUNTER — SPECIALTY PHARMACY (OUTPATIENT)
Dept: PHARMACY | Facility: CLINIC | Age: 46
End: 2021-03-19

## 2021-03-19 DIAGNOSIS — B18.2 CHRONIC HEPATITIS C WITHOUT HEPATIC COMA: Primary | ICD-10-CM

## 2021-03-23 ENCOUNTER — TELEPHONE (OUTPATIENT)
Dept: HEPATOLOGY | Facility: CLINIC | Age: 46
End: 2021-03-23

## 2021-03-23 DIAGNOSIS — K74.60 HEPATIC CIRRHOSIS, UNSPECIFIED HEPATIC CIRRHOSIS TYPE, UNSPECIFIED WHETHER ASCITES PRESENT: Primary | ICD-10-CM

## 2021-03-24 DIAGNOSIS — B18.2 CHRONIC HEPATITIS C WITHOUT HEPATIC COMA: Primary | ICD-10-CM

## 2021-03-31 ENCOUNTER — SPECIALTY PHARMACY (OUTPATIENT)
Dept: PHARMACY | Facility: CLINIC | Age: 46
End: 2021-03-31

## 2021-03-31 DIAGNOSIS — B18.2 CHRONIC HEPATITIS C WITHOUT HEPATIC COMA: Primary | ICD-10-CM

## 2021-04-03 ENCOUNTER — HOSPITAL ENCOUNTER (EMERGENCY)
Facility: HOSPITAL | Age: 46
Discharge: HOME | End: 2021-04-03
Attending: EMERGENCY MEDICINE
Payer: MEDICAID

## 2021-04-03 VITALS
DIASTOLIC BLOOD PRESSURE: 88 MMHG | HEIGHT: 62 IN | TEMPERATURE: 99 F | HEART RATE: 117 BPM | WEIGHT: 126 LBS | SYSTOLIC BLOOD PRESSURE: 123 MMHG | BODY MASS INDEX: 23.19 KG/M2 | OXYGEN SATURATION: 97 % | RESPIRATION RATE: 20 BRPM
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
## 2021-04-03 DIAGNOSIS — L03.114 CELLULITIS OF LEFT UPPER EXTREMITY: ICD-10-CM

## 2021-04-03 PROCEDURE — 90471 IMMUNIZATION ADMIN: CPT | Performed by: EMERGENCY MEDICINE

## 2021-04-03 PROCEDURE — 99284 EMERGENCY DEPT VISIT MOD MDM: CPT | Mod: 25

## 2021-04-03 PROCEDURE — 90715 TDAP VACCINE 7 YRS/> IM: CPT | Performed by: EMERGENCY MEDICINE

## 2021-04-03 PROCEDURE — 96372 THER/PROPH/DIAG INJ SC/IM: CPT

## 2021-04-03 PROCEDURE — 25000003 PHARM REV CODE 250: Performed by: EMERGENCY MEDICINE

## 2021-04-03 PROCEDURE — 63600175 PHARM REV CODE 636 W HCPCS: Performed by: EMERGENCY MEDICINE

## 2021-04-03 RX ORDER — CEFTRIAXONE 1 G/1
1 INJECTION, POWDER, FOR SOLUTION INTRAMUSCULAR; INTRAVENOUS
Status: COMPLETED | OUTPATIENT
Start: 2021-04-03 | End: 2021-04-03

## 2021-04-03 RX ORDER — AZITHROMYCIN 250 MG/1
TABLET, FILM COATED ORAL
Qty: 6 TABLET | Refills: 0 | Status: SHIPPED | OUTPATIENT
Start: 2021-04-03 | End: 2021-04-08

## 2021-04-03 RX ORDER — AMOXICILLIN AND CLAVULANATE POTASSIUM 875; 125 MG/1; MG/1
1 TABLET, FILM COATED ORAL 2 TIMES DAILY
Qty: 14 TABLET | Refills: 0 | Status: CANCELLED | OUTPATIENT
Start: 2021-04-03

## 2021-04-03 RX ORDER — AMOXICILLIN AND CLAVULANATE POTASSIUM 875; 125 MG/1; MG/1
1 TABLET, FILM COATED ORAL 2 TIMES DAILY
Qty: 14 TABLET | Refills: 0 | Status: SHIPPED | OUTPATIENT
Start: 2021-04-03 | End: 2022-08-08

## 2021-04-03 RX ADMIN — CEFTRIAXONE SODIUM 1 G: 1 INJECTION, POWDER, FOR SOLUTION INTRAMUSCULAR; INTRAVENOUS at 08:04

## 2021-04-03 RX ADMIN — CLOSTRIDIUM TETANI TOXOID ANTIGEN (FORMALDEHYDE INACTIVATED), CORYNEBACTERIUM DIPHTHERIAE TOXOID ANTIGEN (FORMALDEHYDE INACTIVATED), BORDETELLA PERTUSSIS TOXOID ANTIGEN (GLUTARALDEHYDE INACTIVATED), BORDETELLA PERTUSSIS FILAMENTOUS HEMAGGLUTININ ANTIGEN (FORMALDEHYDE INACTIVATED), BORDETELLA PERTUSSIS PERTACTIN ANTIGEN, AND BORDETELLA PERTUSSIS FIMBRIAE 2/3 ANTIGEN 0.5 ML: 5; 2; 2.5; 5; 3; 5 INJECTION, SUSPENSION INTRAMUSCULAR at 08:04

## 2021-04-03 RX ADMIN — BACITRACIN ZINC, NEOMYCIN SULFATE, POLYMYXIN B SULFATE 1 EACH: 3.5; 5000; 4 OINTMENT TOPICAL at 09:04

## 2021-04-05 ENCOUNTER — TELEPHONE (OUTPATIENT)
Dept: ORTHOPEDICS | Facility: CLINIC | Age: 46
End: 2021-04-05

## 2021-04-06 ENCOUNTER — PATIENT OUTREACH (OUTPATIENT)
Dept: EMERGENCY MEDICINE | Facility: HOSPITAL | Age: 46
End: 2021-04-06

## 2021-04-07 ENCOUNTER — PATIENT MESSAGE (OUTPATIENT)
Dept: FAMILY MEDICINE | Facility: CLINIC | Age: 46
End: 2021-04-07

## 2021-04-07 ENCOUNTER — IMMUNIZATION (OUTPATIENT)
Dept: PRIMARY CARE CLINIC | Facility: CLINIC | Age: 46
End: 2021-04-07
Payer: MEDICAID

## 2021-04-07 DIAGNOSIS — Z23 NEED FOR VACCINATION: Primary | ICD-10-CM

## 2021-04-07 PROCEDURE — 91300 COVID-19, MRNA, LNP-S, PF, 30 MCG/0.3 ML DOSE VACCINE: CPT | Mod: S$GLB,,, | Performed by: FAMILY MEDICINE

## 2021-04-07 PROCEDURE — 0002A COVID-19, MRNA, LNP-S, PF, 30 MCG/0.3 ML DOSE VACCINE: ICD-10-PCS | Mod: CV19,S$GLB,, | Performed by: FAMILY MEDICINE

## 2021-04-07 PROCEDURE — 91300 COVID-19, MRNA, LNP-S, PF, 30 MCG/0.3 ML DOSE VACCINE: ICD-10-PCS | Mod: S$GLB,,, | Performed by: FAMILY MEDICINE

## 2021-04-07 PROCEDURE — 0002A COVID-19, MRNA, LNP-S, PF, 30 MCG/0.3 ML DOSE VACCINE: CPT | Mod: CV19,S$GLB,, | Performed by: FAMILY MEDICINE

## 2021-04-13 ENCOUNTER — SPECIALTY PHARMACY (OUTPATIENT)
Dept: PHARMACY | Facility: CLINIC | Age: 46
End: 2021-04-13

## 2021-04-13 DIAGNOSIS — B18.2 CHRONIC HEPATITIS C WITHOUT HEPATIC COMA: Primary | ICD-10-CM

## 2021-04-16 ENCOUNTER — TELEPHONE (OUTPATIENT)
Dept: HEPATOLOGY | Facility: CLINIC | Age: 46
End: 2021-04-16

## 2021-04-27 ENCOUNTER — PATIENT MESSAGE (OUTPATIENT)
Dept: HEPATOLOGY | Facility: CLINIC | Age: 46
End: 2021-04-27

## 2021-04-27 DIAGNOSIS — K74.60 HEPATIC CIRRHOSIS, UNSPECIFIED HEPATIC CIRRHOSIS TYPE, UNSPECIFIED WHETHER ASCITES PRESENT: Primary | ICD-10-CM

## 2021-04-27 DIAGNOSIS — B18.2 CHRONIC HEPATITIS C WITHOUT HEPATIC COMA: ICD-10-CM

## 2021-04-28 ENCOUNTER — TELEPHONE (OUTPATIENT)
Dept: GASTROENTEROLOGY | Facility: CLINIC | Age: 46
End: 2021-04-28

## 2021-04-28 DIAGNOSIS — Z20.822 ENCOUNTER FOR LABORATORY TESTING FOR COVID-19 VIRUS: ICD-10-CM

## 2021-05-04 ENCOUNTER — LAB VISIT (OUTPATIENT)
Dept: LAB | Facility: HOSPITAL | Age: 46
End: 2021-05-04
Attending: PHYSICIAN ASSISTANT
Payer: MEDICAID

## 2021-05-04 DIAGNOSIS — K74.60 HEPATIC CIRRHOSIS, UNSPECIFIED HEPATIC CIRRHOSIS TYPE, UNSPECIFIED WHETHER ASCITES PRESENT: ICD-10-CM

## 2021-05-04 LAB
ALBUMIN SERPL BCP-MCNC: 3.6 G/DL (ref 3.5–5.2)
ALP SERPL-CCNC: 77 U/L (ref 55–135)
ALT SERPL W/O P-5'-P-CCNC: 17 U/L (ref 10–44)
ANION GAP SERPL CALC-SCNC: 10 MMOL/L (ref 8–16)
AST SERPL-CCNC: 30 U/L (ref 10–40)
BILIRUB SERPL-MCNC: 0.4 MG/DL (ref 0.1–1)
BUN SERPL-MCNC: 5 MG/DL (ref 6–20)
CALCIUM SERPL-MCNC: 10.1 MG/DL (ref 8.7–10.5)
CHLORIDE SERPL-SCNC: 93 MMOL/L (ref 95–110)
CO2 SERPL-SCNC: 34 MMOL/L (ref 23–29)
CREAT SERPL-MCNC: 0.7 MG/DL (ref 0.5–1.4)
EST. GFR  (AFRICAN AMERICAN): >60 ML/MIN/1.73 M^2
EST. GFR  (NON AFRICAN AMERICAN): >60 ML/MIN/1.73 M^2
GLUCOSE SERPL-MCNC: 154 MG/DL (ref 70–110)
POTASSIUM SERPL-SCNC: 3.3 MMOL/L (ref 3.5–5.1)
PROT SERPL-MCNC: 7.7 G/DL (ref 6–8.4)
SODIUM SERPL-SCNC: 137 MMOL/L (ref 136–145)

## 2021-05-04 PROCEDURE — 80321 ALCOHOLS BIOMARKERS 1OR 2: CPT | Performed by: PHYSICIAN ASSISTANT

## 2021-05-04 PROCEDURE — 36415 COLL VENOUS BLD VENIPUNCTURE: CPT | Performed by: PHYSICIAN ASSISTANT

## 2021-05-04 PROCEDURE — 80053 COMPREHEN METABOLIC PANEL: CPT | Performed by: PHYSICIAN ASSISTANT

## 2021-05-04 PROCEDURE — 87522 HEPATITIS C REVRS TRNSCRPJ: CPT | Performed by: PHYSICIAN ASSISTANT

## 2021-05-06 LAB — HEPATITIS C VIRUS (HCV) RNA DETECTION/QUANTIFICATION RT-PCR: NORMAL IU/ML

## 2021-05-07 ENCOUNTER — TELEPHONE (OUTPATIENT)
Dept: HEPATOLOGY | Facility: CLINIC | Age: 46
End: 2021-05-07

## 2021-05-07 ENCOUNTER — LAB VISIT (OUTPATIENT)
Dept: PRIMARY CARE CLINIC | Facility: CLINIC | Age: 46
End: 2021-05-07
Payer: MEDICAID

## 2021-05-07 ENCOUNTER — TELEPHONE (OUTPATIENT)
Dept: GASTROENTEROLOGY | Facility: CLINIC | Age: 46
End: 2021-05-07

## 2021-05-07 DIAGNOSIS — Z20.822 ENCOUNTER FOR LABORATORY TESTING FOR COVID-19 VIRUS: ICD-10-CM

## 2021-05-07 PROCEDURE — U0003 INFECTIOUS AGENT DETECTION BY NUCLEIC ACID (DNA OR RNA); SEVERE ACUTE RESPIRATORY SYNDROME CORONAVIRUS 2 (SARS-COV-2) (CORONAVIRUS DISEASE [COVID-19]), AMPLIFIED PROBE TECHNIQUE, MAKING USE OF HIGH THROUGHPUT TECHNOLOGIES AS DESCRIBED BY CMS-2020-01-R: HCPCS | Performed by: INTERNAL MEDICINE

## 2021-05-07 PROCEDURE — U0005 INFEC AGEN DETEC AMPLI PROBE: HCPCS | Performed by: INTERNAL MEDICINE

## 2021-05-08 LAB — SARS-COV-2 RNA RESP QL NAA+PROBE: NOT DETECTED

## 2021-05-10 ENCOUNTER — ANESTHESIA EVENT (OUTPATIENT)
Dept: ENDOSCOPY | Facility: HOSPITAL | Age: 46
End: 2021-05-10

## 2021-05-10 ENCOUNTER — TELEPHONE (OUTPATIENT)
Dept: HEPATOLOGY | Facility: CLINIC | Age: 46
End: 2021-05-10

## 2021-05-10 ENCOUNTER — HOSPITAL ENCOUNTER (OUTPATIENT)
Facility: HOSPITAL | Age: 46
Discharge: HOME | End: 2021-05-10
Attending: INTERNAL MEDICINE | Admitting: INTERNAL MEDICINE
Payer: MEDICAID

## 2021-05-10 ENCOUNTER — ANESTHESIA (OUTPATIENT)
Dept: ENDOSCOPY | Facility: HOSPITAL | Age: 46
End: 2021-05-10
Payer: MEDICAID

## 2021-05-10 DIAGNOSIS — K20.90 ESOPHAGITIS DETERMINED BY ENDOSCOPY: ICD-10-CM

## 2021-05-10 DIAGNOSIS — B18.2 CHRONIC HEPATITIS C: Primary | ICD-10-CM

## 2021-05-10 LAB — PHOSPHATIDYLETHANOL (PETH): 196 NG/ML

## 2021-05-10 PROCEDURE — 88305 TISSUE EXAM BY PATHOLOGIST: ICD-10-PCS | Mod: 26,,, | Performed by: PATHOLOGY

## 2021-05-10 PROCEDURE — 25000003 PHARM REV CODE 250: Performed by: INTERNAL MEDICINE

## 2021-05-10 PROCEDURE — 63600175 PHARM REV CODE 636 W HCPCS: Performed by: NURSE ANESTHETIST, CERTIFIED REGISTERED

## 2021-05-10 PROCEDURE — 25000003 PHARM REV CODE 250: Performed by: NURSE ANESTHETIST, CERTIFIED REGISTERED

## 2021-05-10 PROCEDURE — D9220A PRA ANESTHESIA: Mod: CRNA,,, | Performed by: NURSE ANESTHETIST, CERTIFIED REGISTERED

## 2021-05-10 PROCEDURE — 00731 ANES UPR GI NDSC PX NOS: CPT | Performed by: INTERNAL MEDICINE

## 2021-05-10 PROCEDURE — 88305 TISSUE EXAM BY PATHOLOGIST: CPT | Mod: 26,,, | Performed by: PATHOLOGY

## 2021-05-10 PROCEDURE — D9220A PRA ANESTHESIA: ICD-10-PCS | Mod: CRNA,,, | Performed by: NURSE ANESTHETIST, CERTIFIED REGISTERED

## 2021-05-10 PROCEDURE — D9220A PRA ANESTHESIA: Mod: ANES,,, | Performed by: ANESTHESIOLOGY

## 2021-05-10 PROCEDURE — 43239 PR EGD, FLEX, W/BIOPSY, SGL/MULTI: ICD-10-PCS | Mod: ,,, | Performed by: INTERNAL MEDICINE

## 2021-05-10 PROCEDURE — 37000008 HC ANESTHESIA 1ST 15 MINUTES: Performed by: INTERNAL MEDICINE

## 2021-05-10 PROCEDURE — 43239 EGD BIOPSY SINGLE/MULTIPLE: CPT | Mod: ,,, | Performed by: INTERNAL MEDICINE

## 2021-05-10 PROCEDURE — 88342 CHG IMMUNOCYTOCHEMISTRY: ICD-10-PCS | Mod: 26,,, | Performed by: PATHOLOGY

## 2021-05-10 PROCEDURE — 27201012 HC FORCEPS, HOT/COLD, DISP: Performed by: INTERNAL MEDICINE

## 2021-05-10 PROCEDURE — 37000009 HC ANESTHESIA EA ADD 15 MINS: Performed by: INTERNAL MEDICINE

## 2021-05-10 PROCEDURE — 88305 TISSUE EXAM BY PATHOLOGIST: CPT | Performed by: PATHOLOGY

## 2021-05-10 PROCEDURE — 43239 EGD BIOPSY SINGLE/MULTIPLE: CPT | Performed by: INTERNAL MEDICINE

## 2021-05-10 PROCEDURE — 88342 IMHCHEM/IMCYTCHM 1ST ANTB: CPT | Mod: 26,,, | Performed by: PATHOLOGY

## 2021-05-10 PROCEDURE — D9220A PRA ANESTHESIA: ICD-10-PCS | Mod: ANES,,, | Performed by: ANESTHESIOLOGY

## 2021-05-10 PROCEDURE — 88342 IMHCHEM/IMCYTCHM 1ST ANTB: CPT | Performed by: PATHOLOGY

## 2021-05-10 RX ORDER — PROPOFOL 10 MG/ML
VIAL (ML) INTRAVENOUS
Status: DISCONTINUED | OUTPATIENT
Start: 2021-05-10 | End: 2021-05-10

## 2021-05-10 RX ORDER — SODIUM CHLORIDE 9 MG/ML
INJECTION, SOLUTION INTRAVENOUS CONTINUOUS
Status: DISCONTINUED | OUTPATIENT
Start: 2021-05-10 | End: 2021-05-10 | Stop reason: HOSPADM

## 2021-05-10 RX ORDER — LIDOCAINE HCL/PF 100 MG/5ML
SYRINGE (ML) INTRAVENOUS
Status: DISCONTINUED | OUTPATIENT
Start: 2021-05-10 | End: 2021-05-10

## 2021-05-10 RX ADMIN — PROPOFOL 50 MG: 10 INJECTION, EMULSION INTRAVENOUS at 11:05

## 2021-05-10 RX ADMIN — LIDOCAINE HYDROCHLORIDE 100 MG: 20 INJECTION INTRAVENOUS at 11:05

## 2021-05-10 RX ADMIN — SODIUM CHLORIDE: 0.9 INJECTION, SOLUTION INTRAVENOUS at 11:05

## 2021-05-10 RX ADMIN — PROPOFOL 150 MG: 10 INJECTION, EMULSION INTRAVENOUS at 11:05

## 2021-05-11 VITALS
WEIGHT: 126 LBS | HEIGHT: 62 IN | OXYGEN SATURATION: 99 % | RESPIRATION RATE: 18 BRPM | BODY MASS INDEX: 23.19 KG/M2 | HEART RATE: 75 BPM | DIASTOLIC BLOOD PRESSURE: 82 MMHG | TEMPERATURE: 98 F | SYSTOLIC BLOOD PRESSURE: 130 MMHG

## 2021-05-12 ENCOUNTER — SPECIALTY PHARMACY (OUTPATIENT)
Dept: PHARMACY | Facility: CLINIC | Age: 46
End: 2021-05-12

## 2021-05-12 DIAGNOSIS — B18.2 CHRONIC HEPATITIS C WITHOUT HEPATIC COMA: Primary | ICD-10-CM

## 2021-05-13 ENCOUNTER — PATIENT MESSAGE (OUTPATIENT)
Dept: HEPATOLOGY | Facility: CLINIC | Age: 46
End: 2021-05-13

## 2021-05-17 LAB
FINAL PATHOLOGIC DIAGNOSIS: NORMAL
GROSS: NORMAL
Lab: NORMAL
MICROSCOPIC EXAM: NORMAL

## 2021-05-26 ENCOUNTER — TELEPHONE (OUTPATIENT)
Dept: HEPATOLOGY | Facility: CLINIC | Age: 46
End: 2021-05-26

## 2021-05-28 ENCOUNTER — SPECIALTY PHARMACY (OUTPATIENT)
Dept: PHARMACY | Facility: CLINIC | Age: 46
End: 2021-05-28

## 2021-05-31 ENCOUNTER — TELEPHONE (OUTPATIENT)
Dept: HEPATOLOGY | Facility: CLINIC | Age: 46
End: 2021-05-31

## 2021-06-21 ENCOUNTER — TELEPHONE (OUTPATIENT)
Dept: HEPATOLOGY | Facility: CLINIC | Age: 46
End: 2021-06-21

## 2021-07-06 ENCOUNTER — PATIENT MESSAGE (OUTPATIENT)
Dept: PHARMACY | Facility: CLINIC | Age: 46
End: 2021-07-06

## 2021-07-08 ENCOUNTER — SPECIALTY PHARMACY (OUTPATIENT)
Dept: PHARMACY | Facility: CLINIC | Age: 46
End: 2021-07-08

## 2021-07-08 DIAGNOSIS — B18.2 CHRONIC HEPATITIS C WITHOUT HEPATIC COMA: Primary | ICD-10-CM

## 2021-07-08 RX ORDER — OLANZAPINE 10 MG/1
10 TABLET ORAL NIGHTLY
COMMUNITY
End: 2023-03-23

## 2021-07-08 RX ORDER — FLUOXETINE HYDROCHLORIDE 20 MG/1
20 CAPSULE ORAL DAILY
COMMUNITY
End: 2023-03-23

## 2021-07-23 ENCOUNTER — TELEPHONE (OUTPATIENT)
Dept: HEPATOLOGY | Facility: CLINIC | Age: 46
End: 2021-07-23

## 2021-07-23 DIAGNOSIS — B18.2 CHRONIC HEPATITIS C WITHOUT HEPATIC COMA: Primary | ICD-10-CM

## 2021-08-03 ENCOUNTER — SPECIALTY PHARMACY (OUTPATIENT)
Dept: PHARMACY | Facility: CLINIC | Age: 46
End: 2021-08-03

## 2021-09-04 ENCOUNTER — HOSPITAL ENCOUNTER (EMERGENCY)
Facility: HOSPITAL | Age: 46
Discharge: LEFT BEFORE DISCHARGE | End: 2021-09-04
Attending: EMERGENCY MEDICINE
Payer: MEDICAID

## 2021-09-04 ENCOUNTER — HOSPITAL ENCOUNTER (EMERGENCY)
Facility: HOSPITAL | Age: 46
Discharge: HOME | End: 2021-09-05
Attending: EMERGENCY MEDICINE
Payer: MEDICAID

## 2021-09-04 VITALS
HEIGHT: 62 IN | SYSTOLIC BLOOD PRESSURE: 119 MMHG | RESPIRATION RATE: 16 BRPM | TEMPERATURE: 98 F | BODY MASS INDEX: 23.92 KG/M2 | HEART RATE: 88 BPM | DIASTOLIC BLOOD PRESSURE: 69 MMHG | OXYGEN SATURATION: 95 % | WEIGHT: 130 LBS

## 2021-09-04 VITALS
SYSTOLIC BLOOD PRESSURE: 156 MMHG | RESPIRATION RATE: 20 BRPM | DIASTOLIC BLOOD PRESSURE: 97 MMHG | OXYGEN SATURATION: 98 % | HEIGHT: 62 IN | BODY MASS INDEX: 23.92 KG/M2 | WEIGHT: 130 LBS | HEART RATE: 84 BPM | TEMPERATURE: 98 F

## 2021-09-04 DIAGNOSIS — S60.221A CONTUSION OF RIGHT HAND, INITIAL ENCOUNTER: ICD-10-CM

## 2021-09-04 DIAGNOSIS — F10.920 ALCOHOLIC INTOXICATION WITHOUT COMPLICATION: Primary | ICD-10-CM

## 2021-09-04 DIAGNOSIS — F10.10 ALCOHOL ABUSE: Primary | ICD-10-CM

## 2021-09-04 LAB
ALBUMIN SERPL BCP-MCNC: 4.2 G/DL (ref 3.5–5.2)
ALP SERPL-CCNC: 92 U/L (ref 55–135)
ALT SERPL W/O P-5'-P-CCNC: 40 U/L (ref 10–44)
ANION GAP SERPL CALC-SCNC: 16 MMOL/L (ref 8–16)
AST SERPL-CCNC: 78 U/L (ref 10–40)
BASOPHILS NFR BLD: 2 % (ref 0–1.9)
BILIRUB SERPL-MCNC: 0.2 MG/DL (ref 0.1–1)
BUN SERPL-MCNC: 9 MG/DL (ref 6–20)
CALCIUM SERPL-MCNC: 9.6 MG/DL (ref 8.7–10.5)
CHLORIDE SERPL-SCNC: 107 MMOL/L (ref 95–110)
CO2 SERPL-SCNC: 22 MMOL/L (ref 23–29)
CREAT SERPL-MCNC: 0.7 MG/DL (ref 0.5–1.4)
DIFFERENTIAL METHOD: ABNORMAL
EOSINOPHIL NFR BLD: 0 % (ref 0–8)
ERYTHROCYTE [DISTWIDTH] IN BLOOD BY AUTOMATED COUNT: 16.6 % (ref 11.5–14.5)
EST. GFR  (AFRICAN AMERICAN): >60 ML/MIN/1.73 M^2
EST. GFR  (NON AFRICAN AMERICAN): >60 ML/MIN/1.73 M^2
GLUCOSE SERPL-MCNC: 97 MG/DL (ref 70–110)
HCT VFR BLD AUTO: 41.5 % (ref 37–48.5)
HGB BLD-MCNC: 13.8 G/DL (ref 12–16)
IMM GRANULOCYTES # BLD AUTO: ABNORMAL K/UL
IMM GRANULOCYTES NFR BLD AUTO: ABNORMAL %
LIPASE SERPL-CCNC: 96 U/L (ref 4–60)
LYMPHOCYTES NFR BLD: 44 % (ref 18–48)
MCH RBC QN AUTO: 30.7 PG (ref 27–31)
MCHC RBC AUTO-ENTMCNC: 33.3 G/DL (ref 32–36)
MCV RBC AUTO: 92 FL (ref 82–98)
MONOCYTES NFR BLD: 3 % (ref 4–15)
NEUTROPHILS NFR BLD: 51 % (ref 38–73)
NRBC BLD-RTO: 0 /100 WBC
PLATELET # BLD AUTO: 180 K/UL (ref 150–450)
PLATELET BLD QL SMEAR: ABNORMAL
PMV BLD AUTO: 11.2 FL (ref 9.2–12.9)
POTASSIUM SERPL-SCNC: 4.4 MMOL/L (ref 3.5–5.1)
PROT SERPL-MCNC: 9.3 G/DL (ref 6–8.4)
RBC # BLD AUTO: 4.49 M/UL (ref 4–5.4)
SODIUM SERPL-SCNC: 145 MMOL/L (ref 136–145)
WBC # BLD AUTO: 8.83 K/UL (ref 3.9–12.7)

## 2021-09-04 PROCEDURE — 25000003 PHARM REV CODE 250: Performed by: EMERGENCY MEDICINE

## 2021-09-04 PROCEDURE — 99283 EMERGENCY DEPT VISIT LOW MDM: CPT | Mod: 25,27

## 2021-09-04 PROCEDURE — 83690 ASSAY OF LIPASE: CPT | Performed by: EMERGENCY MEDICINE

## 2021-09-04 PROCEDURE — 63600175 PHARM REV CODE 636 W HCPCS: Performed by: EMERGENCY MEDICINE

## 2021-09-04 PROCEDURE — 96374 THER/PROPH/DIAG INJ IV PUSH: CPT

## 2021-09-04 PROCEDURE — 96361 HYDRATE IV INFUSION ADD-ON: CPT

## 2021-09-04 PROCEDURE — 85007 BL SMEAR W/DIFF WBC COUNT: CPT | Performed by: EMERGENCY MEDICINE

## 2021-09-04 PROCEDURE — 85027 COMPLETE CBC AUTOMATED: CPT | Performed by: EMERGENCY MEDICINE

## 2021-09-04 PROCEDURE — 80053 COMPREHEN METABOLIC PANEL: CPT | Performed by: EMERGENCY MEDICINE

## 2021-09-04 PROCEDURE — 99284 EMERGENCY DEPT VISIT MOD MDM: CPT | Mod: 25

## 2021-09-04 RX ORDER — SODIUM CHLORIDE 9 MG/ML
INJECTION, SOLUTION INTRAVENOUS
Status: COMPLETED | OUTPATIENT
Start: 2021-09-04 | End: 2021-09-04

## 2021-09-04 RX ORDER — ONDANSETRON 2 MG/ML
4 INJECTION INTRAMUSCULAR; INTRAVENOUS
Status: COMPLETED | OUTPATIENT
Start: 2021-09-04 | End: 2021-09-04

## 2021-09-04 RX ADMIN — SODIUM CHLORIDE: 0.9 INJECTION, SOLUTION INTRAVENOUS at 05:09

## 2021-09-04 RX ADMIN — ONDANSETRON 4 MG: 2 INJECTION INTRAMUSCULAR; INTRAVENOUS at 05:09

## 2021-09-05 VITALS
WEIGHT: 125 LBS | TEMPERATURE: 98 F | SYSTOLIC BLOOD PRESSURE: 124 MMHG | HEIGHT: 62 IN | BODY MASS INDEX: 23 KG/M2 | HEART RATE: 84 BPM | DIASTOLIC BLOOD PRESSURE: 81 MMHG | RESPIRATION RATE: 18 BRPM | OXYGEN SATURATION: 98 %

## 2021-09-05 DIAGNOSIS — S61.431A PUNCTURE WOUND OF RIGHT HAND WITHOUT FOREIGN BODY, INITIAL ENCOUNTER: Primary | ICD-10-CM

## 2021-09-05 PROCEDURE — 25000003 PHARM REV CODE 250: Performed by: EMERGENCY MEDICINE

## 2021-09-05 PROCEDURE — 99283 EMERGENCY DEPT VISIT LOW MDM: CPT

## 2021-09-05 RX ORDER — LORATADINE 10 MG/1
10 TABLET ORAL DAILY
COMMUNITY
Start: 2021-06-03 | End: 2023-03-23

## 2021-09-05 RX ORDER — METRONIDAZOLE 500 MG/1
2000 TABLET ORAL ONCE
COMMUNITY
Start: 2021-06-03 | End: 2023-03-23

## 2021-09-05 RX ORDER — GABAPENTIN 300 MG/1
300 CAPSULE ORAL NIGHTLY
COMMUNITY
Start: 2021-06-16 | End: 2024-02-14

## 2021-09-05 RX ORDER — TRAZODONE HYDROCHLORIDE 100 MG/1
100 TABLET ORAL DAILY
COMMUNITY
Start: 2021-05-25 | End: 2023-03-23

## 2021-09-05 RX ORDER — CEPHALEXIN 250 MG/1
500 CAPSULE ORAL
Status: COMPLETED | OUTPATIENT
Start: 2021-09-05 | End: 2021-09-05

## 2021-09-05 RX ORDER — LACTULOSE 10 G/15ML
SOLUTION ORAL; RECTAL
COMMUNITY
Start: 2021-04-29 | End: 2022-08-08

## 2021-09-05 RX ORDER — LIDOCAINE HYDROCHLORIDE 10 MG/ML
10 INJECTION INFILTRATION; PERINEURAL
Status: DISCONTINUED | OUTPATIENT
Start: 2021-09-05 | End: 2021-09-05

## 2021-09-05 RX ORDER — VARENICLINE TARTRATE 1 MG/1
1 TABLET, FILM COATED ORAL 2 TIMES DAILY
COMMUNITY
Start: 2021-06-17 | End: 2023-03-23

## 2021-09-05 RX ORDER — FAMOTIDINE 20 MG/1
20 TABLET, FILM COATED ORAL DAILY
COMMUNITY
Start: 2021-06-20 | End: 2022-08-08

## 2021-09-05 RX ORDER — LEUCOVORIN, FOLIC ACID, LEVOMEFOLATE MAGNESIUM, FERROUS CYSTEINE GLYCINATE, 1,2-DOCOSAHEXANOYL-SN-GLYCERO-3-PHOSPHOSERINE CALCIUM, 1,2-ICOSAPENTOYL-SN-GLYCERO-3-PHOSPHOSERINE CALCIUM, PHOSPHATIDYL SERINE, PYRIDOXAL 5-PHOSPHATE, FLAVIN ADENINE DINUCLEOTIDE, NADH, COBAMAMIDE, COCARBOXYLASE (THIAMINE PYROPHOSPHATE), MAGNESIUM ASCORBATE, ZINC ASCORBATE, MAGNESIUM L-THREONATE AND BETAINE 2.5; 1; 7; 13.6; 6.4; 800; 12; 25; 25; 25; 50; 25; 24; 1; 1; 500; 1.83; 3.67 MG/1; MG/1; MG/1; MG/1; MG/1; UG/1; MG/1; UG/1; UG/1; UG/1; UG/1; UG/1; MG/1; MG/1; MG/1; UG/1; MG/1; MG/1
1 CAPSULE, DELAYED RELEASE PELLETS ORAL DAILY
COMMUNITY
Start: 2021-06-16 | End: 2023-03-23

## 2021-09-05 RX ORDER — BACITRACIN ZINC 500 [USP'U]/G
3 OINTMENT TOPICAL
Status: COMPLETED | OUTPATIENT
Start: 2021-09-05 | End: 2021-09-05

## 2021-09-05 RX ORDER — TRAZODONE HYDROCHLORIDE 150 MG/1
150 TABLET ORAL NIGHTLY
COMMUNITY
Start: 2021-06-20 | End: 2023-03-23

## 2021-09-05 RX ORDER — IBUPROFEN 400 MG/1
400 TABLET ORAL
Status: COMPLETED | OUTPATIENT
Start: 2021-09-05 | End: 2021-09-05

## 2021-09-05 RX ORDER — GABAPENTIN 100 MG/1
CAPSULE ORAL
COMMUNITY
Start: 2021-06-28 | End: 2023-03-23

## 2021-09-05 RX ORDER — CLONIDINE HYDROCHLORIDE 0.1 MG/1
0.1 TABLET ORAL EVERY 6 HOURS PRN
COMMUNITY
Start: 2021-05-25 | End: 2023-03-23

## 2021-09-05 RX ORDER — OLANZAPINE 5 MG/1
5 TABLET ORAL DAILY
COMMUNITY
Start: 2021-06-28 | End: 2023-03-23

## 2021-09-05 RX ORDER — FLUOXETINE 10 MG/1
10 CAPSULE ORAL EVERY MORNING
COMMUNITY
Start: 2021-06-28 | End: 2023-03-23

## 2021-09-05 RX ORDER — ONDANSETRON 4 MG/1
TABLET, ORALLY DISINTEGRATING ORAL
COMMUNITY
Start: 2021-07-28 | End: 2022-08-08

## 2021-09-05 RX ORDER — DICYCLOMINE HYDROCHLORIDE 20 MG/1
20 TABLET ORAL EVERY 4 HOURS PRN
COMMUNITY
Start: 2021-05-25 | End: 2023-03-23

## 2021-09-05 RX ORDER — NALTREXONE HYDROCHLORIDE 50 MG/1
TABLET, FILM COATED ORAL
COMMUNITY
Start: 2021-05-27 | End: 2023-03-23

## 2021-09-05 RX ORDER — VARENICLINE TARTRATE 0.5 MG/1
TABLET, FILM COATED ORAL
COMMUNITY
Start: 2021-06-16 | End: 2023-03-23

## 2021-09-05 RX ORDER — METHOCARBAMOL 750 MG/1
750 TABLET, FILM COATED ORAL EVERY 8 HOURS PRN
COMMUNITY
Start: 2021-05-25 | End: 2023-03-23

## 2021-09-05 RX ORDER — NALTREXONE 380 MG
KIT INTRAMUSCULAR
COMMUNITY
Start: 2021-07-07 | End: 2023-03-23

## 2021-09-05 RX ADMIN — IBUPROFEN 400 MG: 400 TABLET ORAL at 07:09

## 2021-09-05 RX ADMIN — CEPHALEXIN 500 MG: 250 CAPSULE ORAL at 07:09

## 2021-09-05 RX ADMIN — BACITRACIN 3 EACH: 500 OINTMENT TOPICAL at 07:09

## 2021-09-10 ENCOUNTER — PATIENT OUTREACH (OUTPATIENT)
Dept: EMERGENCY MEDICINE | Facility: HOSPITAL | Age: 46
End: 2021-09-10

## 2021-09-14 ENCOUNTER — HOSPITAL ENCOUNTER (OUTPATIENT)
Dept: RADIOLOGY | Facility: HOSPITAL | Age: 46
Discharge: HOME | End: 2021-09-14
Attending: PHYSICIAN ASSISTANT
Payer: MEDICAID

## 2021-09-14 DIAGNOSIS — K74.60 HEPATIC CIRRHOSIS, UNSPECIFIED HEPATIC CIRRHOSIS TYPE, UNSPECIFIED WHETHER ASCITES PRESENT: ICD-10-CM

## 2021-09-14 PROCEDURE — 76700 US EXAM ABDOM COMPLETE: CPT | Mod: TC

## 2021-09-14 PROCEDURE — 76700 US ABDOMEN COMPLETE: ICD-10-PCS | Mod: 26,,, | Performed by: RADIOLOGY

## 2021-09-14 PROCEDURE — 76700 US EXAM ABDOM COMPLETE: CPT | Mod: 26,,, | Performed by: RADIOLOGY

## 2021-09-20 ENCOUNTER — PATIENT MESSAGE (OUTPATIENT)
Dept: HEPATOLOGY | Facility: CLINIC | Age: 46
End: 2021-09-20

## 2021-09-20 ENCOUNTER — TELEPHONE (OUTPATIENT)
Dept: HEPATOLOGY | Facility: CLINIC | Age: 46
End: 2021-09-20

## 2021-09-20 DIAGNOSIS — K74.60 HEPATIC CIRRHOSIS, UNSPECIFIED HEPATIC CIRRHOSIS TYPE, UNSPECIFIED WHETHER ASCITES PRESENT: Primary | ICD-10-CM

## 2022-01-06 ENCOUNTER — LAB VISIT (OUTPATIENT)
Dept: LAB | Facility: HOSPITAL | Age: 47
End: 2022-01-06
Attending: PHYSICIAN ASSISTANT
Payer: MEDICAID

## 2022-01-06 DIAGNOSIS — K74.60 HEPATIC CIRRHOSIS, UNSPECIFIED HEPATIC CIRRHOSIS TYPE, UNSPECIFIED WHETHER ASCITES PRESENT: ICD-10-CM

## 2022-01-06 LAB
ALBUMIN SERPL BCP-MCNC: 4.4 G/DL (ref 3.5–5.2)
ALP SERPL-CCNC: 84 U/L (ref 55–135)
ALT SERPL W/O P-5'-P-CCNC: 15 U/L (ref 10–44)
ANION GAP SERPL CALC-SCNC: 14 MMOL/L (ref 8–16)
AST SERPL-CCNC: 22 U/L (ref 10–40)
BILIRUB SERPL-MCNC: 0.4 MG/DL (ref 0.1–1)
BUN SERPL-MCNC: 8 MG/DL (ref 6–20)
CALCIUM SERPL-MCNC: 10.1 MG/DL (ref 8.7–10.5)
CHLORIDE SERPL-SCNC: 102 MMOL/L (ref 95–110)
CO2 SERPL-SCNC: 26 MMOL/L (ref 23–29)
CREAT SERPL-MCNC: 0.7 MG/DL (ref 0.5–1.4)
EST. GFR  (AFRICAN AMERICAN): >60 ML/MIN/1.73 M^2
EST. GFR  (NON AFRICAN AMERICAN): >60 ML/MIN/1.73 M^2
GLUCOSE SERPL-MCNC: 104 MG/DL (ref 70–110)
POTASSIUM SERPL-SCNC: 3.7 MMOL/L (ref 3.5–5.1)
PROT SERPL-MCNC: 8 G/DL (ref 6–8.4)
SODIUM SERPL-SCNC: 142 MMOL/L (ref 136–145)

## 2022-01-06 PROCEDURE — 87522 HEPATITIS C REVRS TRNSCRPJ: CPT | Performed by: PHYSICIAN ASSISTANT

## 2022-01-06 PROCEDURE — 36415 COLL VENOUS BLD VENIPUNCTURE: CPT | Performed by: PHYSICIAN ASSISTANT

## 2022-01-06 PROCEDURE — 80053 COMPREHEN METABOLIC PANEL: CPT | Performed by: PHYSICIAN ASSISTANT

## 2022-01-08 LAB — HEPATITIS C VIRUS (HCV) RNA DETECTION/QUANTIFICATION RT-PCR: NORMAL IU/ML

## 2022-01-10 ENCOUNTER — TELEPHONE (OUTPATIENT)
Dept: HEPATOLOGY | Facility: CLINIC | Age: 47
End: 2022-01-10
Payer: MEDICAID

## 2022-01-10 DIAGNOSIS — Z86.19 HEPATITIS C VIRUS INFECTION CURED AFTER ANTIVIRAL DRUG THERAPY: ICD-10-CM

## 2022-01-10 PROBLEM — B18.2 CHRONIC HEPATITIS C WITHOUT HEPATIC COMA: Status: RESOLVED | Noted: 2021-02-12 | Resolved: 2022-01-10

## 2022-01-10 PROBLEM — R74.01 TRANSAMINITIS: Status: RESOLVED | Noted: 2020-12-11 | Resolved: 2022-01-10

## 2022-01-10 PROBLEM — B18.2 CHRONIC HEPATITIS C: Status: RESOLVED | Noted: 2021-05-10 | Resolved: 2022-01-10

## 2022-01-10 NOTE — TELEPHONE ENCOUNTER
Pt began 24 weeks of Epclusa on 3/24/21  Anticipated treatment end date: 9/7/21  Kaia 1  Treatment naive  F4 - decomp    1/6/22  CMP - normal  HCV - neg      These labs document SVR following successful HCV treatment with Epclusa  This is consistent with a cure. Relapse is not expected.   No immunity is conferred and patient could be reinfected.     Pt has been notified by [facility]        Next appts:  - CBC, CMP, INR, AFP, HCV RNA, U/S, VISIT - 3/2022

## 2022-01-13 ENCOUNTER — HOSPITAL ENCOUNTER (EMERGENCY)
Facility: HOSPITAL | Age: 47
Discharge: PSYCHIATRIC OR SUBSTANCE USE FACILITY | End: 2022-01-14
Attending: EMERGENCY MEDICINE
Payer: MEDICAID

## 2022-01-13 DIAGNOSIS — F22 PARANOID DELUSION: Primary | ICD-10-CM

## 2022-01-13 DIAGNOSIS — F19.10 POLYSUBSTANCE ABUSE: ICD-10-CM

## 2022-01-13 LAB
ALBUMIN SERPL BCP-MCNC: 4.4 G/DL (ref 3.5–5.2)
ALP SERPL-CCNC: 96 U/L (ref 55–135)
ALT SERPL W/O P-5'-P-CCNC: 20 U/L (ref 10–44)
AMPHET+METHAMPHET UR QL: ABNORMAL
ANION GAP SERPL CALC-SCNC: 12 MMOL/L (ref 8–16)
APAP SERPL-MCNC: <3 UG/ML (ref 10–20)
AST SERPL-CCNC: 26 U/L (ref 10–40)
BARBITURATES UR QL SCN>200 NG/ML: NEGATIVE
BASOPHILS # BLD AUTO: 0.06 K/UL (ref 0–0.2)
BASOPHILS NFR BLD: 1 % (ref 0–1.9)
BENZODIAZ UR QL SCN>200 NG/ML: NEGATIVE
BILIRUB SERPL-MCNC: 0.6 MG/DL (ref 0.1–1)
BILIRUB UR QL STRIP: NEGATIVE
BUN SERPL-MCNC: 6 MG/DL (ref 6–20)
BZE UR QL SCN: NEGATIVE
CALCIUM SERPL-MCNC: 10.4 MG/DL (ref 8.7–10.5)
CANNABINOIDS UR QL SCN: ABNORMAL
CHLORIDE SERPL-SCNC: 103 MMOL/L (ref 95–110)
CLARITY UR: CLEAR
CO2 SERPL-SCNC: 27 MMOL/L (ref 23–29)
COLOR UR: YELLOW
CREAT SERPL-MCNC: 0.7 MG/DL (ref 0.5–1.4)
CREAT UR-MCNC: 14.1 MG/DL (ref 15–325)
DIFFERENTIAL METHOD: ABNORMAL
EOSINOPHIL # BLD AUTO: 0.1 K/UL (ref 0–0.5)
EOSINOPHIL NFR BLD: 2.2 % (ref 0–8)
ERYTHROCYTE [DISTWIDTH] IN BLOOD BY AUTOMATED COUNT: 13.3 % (ref 11.5–14.5)
EST. GFR  (AFRICAN AMERICAN): >60 ML/MIN/1.73 M^2
EST. GFR  (NON AFRICAN AMERICAN): >60 ML/MIN/1.73 M^2
ETHANOL SERPL-MCNC: 82 MG/DL
GLUCOSE SERPL-MCNC: 92 MG/DL (ref 70–110)
GLUCOSE UR QL STRIP: NEGATIVE
HCT VFR BLD AUTO: 44 % (ref 37–48.5)
HGB BLD-MCNC: 15.1 G/DL (ref 12–16)
HGB UR QL STRIP: NEGATIVE
IMM GRANULOCYTES # BLD AUTO: 0.01 K/UL (ref 0–0.04)
IMM GRANULOCYTES NFR BLD AUTO: 0.2 % (ref 0–0.5)
KETONES UR QL STRIP: NEGATIVE
LEUKOCYTE ESTERASE UR QL STRIP: NEGATIVE
LYMPHOCYTES # BLD AUTO: 3.1 K/UL (ref 1–4.8)
LYMPHOCYTES NFR BLD: 50 % (ref 18–48)
MCH RBC QN AUTO: 31.7 PG (ref 27–31)
MCHC RBC AUTO-ENTMCNC: 34.3 G/DL (ref 32–36)
MCV RBC AUTO: 92 FL (ref 82–98)
METHADONE UR QL SCN>300 NG/ML: NEGATIVE
MONOCYTES # BLD AUTO: 0.3 K/UL (ref 0.3–1)
MONOCYTES NFR BLD: 5.4 % (ref 4–15)
NEUTROPHILS # BLD AUTO: 2.6 K/UL (ref 1.8–7.7)
NEUTROPHILS NFR BLD: 41.2 % (ref 38–73)
NITRITE UR QL STRIP: NEGATIVE
NRBC BLD-RTO: 0 /100 WBC
OPIATES UR QL SCN: NEGATIVE
PCP UR QL SCN>25 NG/ML: NEGATIVE
PH UR STRIP: 7 [PH] (ref 5–8)
PLATELET # BLD AUTO: 223 K/UL (ref 150–450)
PMV BLD AUTO: 9.3 FL (ref 9.2–12.9)
POTASSIUM SERPL-SCNC: 3.4 MMOL/L (ref 3.5–5.1)
PROT SERPL-MCNC: 8.5 G/DL (ref 6–8.4)
PROT UR QL STRIP: NEGATIVE
RBC # BLD AUTO: 4.77 M/UL (ref 4–5.4)
SARS-COV-2 RDRP RESP QL NAA+PROBE: NEGATIVE
SODIUM SERPL-SCNC: 142 MMOL/L (ref 136–145)
SP GR UR STRIP: <=1.005 (ref 1–1.03)
TOXICOLOGY INFORMATION: ABNORMAL
TSH SERPL DL<=0.005 MIU/L-ACNC: 0.85 UIU/ML (ref 0.4–4)
URN SPEC COLLECT METH UR: ABNORMAL
UROBILINOGEN UR STRIP-ACNC: NEGATIVE EU/DL
WBC # BLD AUTO: 6.26 K/UL (ref 3.9–12.7)

## 2022-01-13 PROCEDURE — 80307 DRUG TEST PRSMV CHEM ANLYZR: CPT | Performed by: EMERGENCY MEDICINE

## 2022-01-13 PROCEDURE — 99215 PR OFFICE/OUTPT VISIT, EST, LEVL V, 40-54 MIN: ICD-10-PCS | Mod: 95,AF,HB, | Performed by: STUDENT IN AN ORGANIZED HEALTH CARE EDUCATION/TRAINING PROGRAM

## 2022-01-13 PROCEDURE — 99215 OFFICE O/P EST HI 40 MIN: CPT | Mod: 95,AF,HB, | Performed by: STUDENT IN AN ORGANIZED HEALTH CARE EDUCATION/TRAINING PROGRAM

## 2022-01-13 PROCEDURE — 99285 EMERGENCY DEPT VISIT HI MDM: CPT | Mod: 25

## 2022-01-13 PROCEDURE — 36415 COLL VENOUS BLD VENIPUNCTURE: CPT | Performed by: EMERGENCY MEDICINE

## 2022-01-13 PROCEDURE — 81003 URINALYSIS AUTO W/O SCOPE: CPT | Mod: 59 | Performed by: EMERGENCY MEDICINE

## 2022-01-13 PROCEDURE — 80053 COMPREHEN METABOLIC PANEL: CPT | Performed by: EMERGENCY MEDICINE

## 2022-01-13 PROCEDURE — U0002 COVID-19 LAB TEST NON-CDC: HCPCS | Performed by: EMERGENCY MEDICINE

## 2022-01-13 PROCEDURE — 84443 ASSAY THYROID STIM HORMONE: CPT | Performed by: EMERGENCY MEDICINE

## 2022-01-13 PROCEDURE — 82077 ASSAY SPEC XCP UR&BREATH IA: CPT | Performed by: EMERGENCY MEDICINE

## 2022-01-13 PROCEDURE — 25000003 PHARM REV CODE 250: Performed by: EMERGENCY MEDICINE

## 2022-01-13 PROCEDURE — 85025 COMPLETE CBC W/AUTO DIFF WBC: CPT | Performed by: EMERGENCY MEDICINE

## 2022-01-13 PROCEDURE — 80143 DRUG ASSAY ACETAMINOPHEN: CPT | Performed by: EMERGENCY MEDICINE

## 2022-01-13 RX ORDER — IBUPROFEN 200 MG
1 TABLET ORAL
Status: DISCONTINUED | OUTPATIENT
Start: 2022-01-13 | End: 2022-01-14 | Stop reason: HOSPADM

## 2022-01-13 RX ORDER — OLANZAPINE 5 MG/1
5 TABLET, ORALLY DISINTEGRATING ORAL
Status: COMPLETED | OUTPATIENT
Start: 2022-01-13 | End: 2022-01-13

## 2022-01-13 RX ADMIN — OLANZAPINE 5 MG: 5 TABLET, ORALLY DISINTEGRATING ORAL at 08:01
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
## 2022-01-14 VITALS
DIASTOLIC BLOOD PRESSURE: 82 MMHG | SYSTOLIC BLOOD PRESSURE: 134 MMHG | HEIGHT: 62 IN | WEIGHT: 120 LBS | BODY MASS INDEX: 22.08 KG/M2 | RESPIRATION RATE: 18 BRPM | TEMPERATURE: 98 F | HEART RATE: 84 BPM | OXYGEN SATURATION: 96 %
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
## 2022-01-14 NOTE — ED NOTES
"Pt became upset/agitated upon learning from Dr Marroquin that she can't be discharge home, demanded to be discharge home, claimed that she is not feeling suicidal or homicidal, "I'm just having nervous breakdown".  "

## 2022-01-27 ENCOUNTER — HOSPITAL ENCOUNTER (EMERGENCY)
Facility: HOSPITAL | Age: 47
Discharge: LEFT BEFORE DISCHARGE | End: 2022-01-27
Attending: EMERGENCY MEDICINE
Payer: MEDICAID

## 2022-01-27 VITALS
OXYGEN SATURATION: 99 % | HEIGHT: 62 IN | WEIGHT: 125 LBS | SYSTOLIC BLOOD PRESSURE: 118 MMHG | RESPIRATION RATE: 20 BRPM | DIASTOLIC BLOOD PRESSURE: 70 MMHG | TEMPERATURE: 99 F | BODY MASS INDEX: 23 KG/M2 | HEART RATE: 98 BPM

## 2022-01-27 DIAGNOSIS — S91.311A LACERATION OF RIGHT FOOT: ICD-10-CM

## 2022-01-27 PROCEDURE — 99283 EMERGENCY DEPT VISIT LOW MDM: CPT

## 2022-01-28 NOTE — FIRST PROVIDER EVALUATION
Emergency Department TeleTriage Encounter Note      CHIEF COMPLAINT    Chief Complaint   Patient presents with    Laceration     Laceration to R. Foot after stepping on glass bowl approx. 45 mins PTA; Reports UTD on TDAP        VITAL SIGNS   Initial Vitals [01/27/22 1942]   BP Pulse Resp Temp SpO2   118/70 98 20 98.9 °F (37.2 °C) 99 %      MAP       --            ALLERGIES    Review of patient's allergies indicates:  No Known Allergies    PROVIDER TRIAGE NOTE  TeleTriage Note: Carmen Landon, a nontoxic/well appearing, 47 y.o. female, presented to the ED with c/o laceration to right foot that occurred after she accidentally stepped on a glass bowl. Patient is UTD on her Tdap (last one 4/2021).    All ED beds are full at present; patient notified of this status.  Patient seen and medically screened by Nurse Practitioner via teletriage. Orders initiated at triage to expedite care.  Patient is stable to return to the waiting room and will be placed in an ED bed when available.  Care will be transferred to an alternate provider when patient has been placed in an Exam Room from the [facility] for physical exam, additional orders, and disposition.  8:20 PM Rebekah Navarro DNP, FNP-C        ORDERS  Labs Reviewed - No data to display    ED Orders (720h ago, onward)    Start Ordered     Status Ordering Provider    01/27/22 2021 01/27/22 2021  X-Ray Foot Complete Right  1 time imaging        Comments: Rule out foreign body    Ordered REBEKAH NAVARRO            Virtual Visit Note: The provider triage portion of this emergency department evaluation and documentation was performed via [employer], a HIPAA-compliant telemedicine application, in concert with a tele-presenter in the room. A face to face patient evaluation with one of my colleagues will occur once the patient is placed in an emergency department room.      DISCLAIMER: This note was prepared with M*Modal voice recognition transcription software. Jai  syntax, mangled pronouns, and other bizarre constructions may be attributed to that software system.

## 2022-01-31 ENCOUNTER — PATIENT MESSAGE (OUTPATIENT)
Dept: HEPATOLOGY | Facility: CLINIC | Age: 47
End: 2022-01-31
Payer: MEDICAID

## 2022-01-31 NOTE — TELEPHONE ENCOUNTER
I spoke with patient and msg from PA Scheuermann relayed.  She states that she would prefer that scheduled appts remain on 3/15/22 as scheduled.

## 2022-01-31 NOTE — TELEPHONE ENCOUNTER
pls tell pt:  3 of 4 labs needed to calculate MELD score were done during ER visit couple weeks ago. I can't calculate score since all 4 weren't done, but the 3 that were done were all normal so I'm not very suspicious that her MELD would suddenly be very abnormal.    If she wants an appointment before March that is fine.    thanks

## 2022-03-15 ENCOUNTER — TELEPHONE (OUTPATIENT)
Dept: HEPATOLOGY | Facility: CLINIC | Age: 47
End: 2022-03-15
Payer: MEDICAID

## 2022-03-15 NOTE — TELEPHONE ENCOUNTER
Called pt to reschedule missed us and labs as well as office visit with J. Scheuermann. No answer. lvm telling pt to call or message or await my call to reschedule

## 2022-07-05 ENCOUNTER — TELEPHONE (OUTPATIENT)
Dept: HEPATOLOGY | Facility: CLINIC | Age: 47
End: 2022-07-05
Payer: MEDICAID

## 2022-07-05 NOTE — TELEPHONE ENCOUNTER
pls call pt  Tell her I'm glad she reached out to us.  Please keep her name on list to move up appt w/ me if someone cancels.    pls add peth to upcoming labs.    I would not expect her hot flashes, n/v to be due to her liver disease. She needs to f/u w/ PCP or see GI for this. Or urgent care.    Is she taking any stomach acid medicine at all?

## 2022-07-05 NOTE — TELEPHONE ENCOUNTER
----- Message from Linda Angulo RN sent at 7/5/2022  2:16 PM CDT -----  Patient phoned stating that she was unable to respond to messages because she was incarcerated for 3 mths.  She reports taking Lactulose as ordered but she is still experiencing fatigue, forgetfulness and dizziness.  She states that all of a sudden she will feel hot, become nauseated and start throwing up.  She has frequent nose bleeds, bruises easily and has mild ankle swelling.  She denied coughing up blood, seeing blood in stool, having black tarry stools, or having yellowing of skin/eyes.  HCC testing scheduled 7/13 and virtual visit scheduled 8/8; appt reminder notices mailed.  Patient is asking that Zofran be called in to help her with nausea.  Preferred pharmacy verified.

## 2022-07-06 DIAGNOSIS — K74.60 HEPATIC CIRRHOSIS, UNSPECIFIED HEPATIC CIRRHOSIS TYPE, UNSPECIFIED WHETHER ASCITES PRESENT: Primary | ICD-10-CM

## 2022-07-06 NOTE — TELEPHONE ENCOUNTER
Attempt made to reach patient.  Msg from PA Scheuermann left on her VM.  I stressed that she call back with info regarding her stomach acid use.

## 2022-07-07 ENCOUNTER — TELEPHONE (OUTPATIENT)
Dept: HEPATOLOGY | Facility: CLINIC | Age: 47
End: 2022-07-07
Payer: MEDICAID

## 2022-07-07 NOTE — TELEPHONE ENCOUNTER
----- Message from Floyd Kelley sent at 7/6/2022  4:14 PM CDT -----  Regarding: call back  Pt call to speak with Janette in regards to a rx    Call

## 2022-07-08 NOTE — TELEPHONE ENCOUNTER
I spoke with patient.  She is using Pepcid 20 mg daily OTC to help with stomach issues.  I stressed that you wanted to her f/u with PCP or gastro for stomach symptoms.

## 2022-07-27 ENCOUNTER — HOSPITAL ENCOUNTER (OUTPATIENT)
Dept: RADIOLOGY | Facility: HOSPITAL | Age: 47
Discharge: HOME | End: 2022-07-27
Attending: PHYSICIAN ASSISTANT
Payer: MEDICAID

## 2022-07-27 DIAGNOSIS — K74.60 HEPATIC CIRRHOSIS, UNSPECIFIED HEPATIC CIRRHOSIS TYPE, UNSPECIFIED WHETHER ASCITES PRESENT: ICD-10-CM

## 2022-07-27 PROCEDURE — 76705 US ABDOMEN LIMITED: ICD-10-PCS | Mod: 26,,, | Performed by: RADIOLOGY

## 2022-07-27 PROCEDURE — 76705 ECHO EXAM OF ABDOMEN: CPT | Mod: 26,,, | Performed by: RADIOLOGY

## 2022-07-27 PROCEDURE — 76705 ECHO EXAM OF ABDOMEN: CPT | Mod: TC

## 2022-08-08 ENCOUNTER — TELEPHONE (OUTPATIENT)
Dept: HEPATOLOGY | Facility: CLINIC | Age: 47
End: 2022-08-08
Payer: MEDICAID

## 2022-08-08 ENCOUNTER — OFFICE VISIT (OUTPATIENT)
Dept: HEPATOLOGY | Facility: CLINIC | Age: 47
End: 2022-08-08
Payer: MEDICAID

## 2022-08-08 DIAGNOSIS — K21.00 GASTROESOPHAGEAL REFLUX DISEASE WITH ESOPHAGITIS WITHOUT HEMORRHAGE: ICD-10-CM

## 2022-08-08 DIAGNOSIS — K20.90 ESOPHAGITIS DETERMINED BY ENDOSCOPY: ICD-10-CM

## 2022-08-08 DIAGNOSIS — K70.9 ALCOHOLIC LIVER DISEASE: ICD-10-CM

## 2022-08-08 DIAGNOSIS — K74.60 HEPATIC CIRRHOSIS, UNSPECIFIED HEPATIC CIRRHOSIS TYPE, UNSPECIFIED WHETHER ASCITES PRESENT: Primary | ICD-10-CM

## 2022-08-08 DIAGNOSIS — Z86.19 HEPATITIS C VIRUS INFECTION CURED AFTER ANTIVIRAL DRUG THERAPY: ICD-10-CM

## 2022-08-08 DIAGNOSIS — K22.10 ESOPHAGITIS, EROSIVE: ICD-10-CM

## 2022-08-08 DIAGNOSIS — K76.82 HEPATIC ENCEPHALOPATHY: ICD-10-CM

## 2022-08-08 PROCEDURE — 1159F PR MEDICATION LIST DOCUMENTED IN MEDICAL RECORD: ICD-10-PCS | Mod: CPTII,95,, | Performed by: PHYSICIAN ASSISTANT

## 2022-08-08 PROCEDURE — 1159F MED LIST DOCD IN RCRD: CPT | Mod: CPTII,95,, | Performed by: PHYSICIAN ASSISTANT

## 2022-08-08 PROCEDURE — 1160F RVW MEDS BY RX/DR IN RCRD: CPT | Mod: CPTII,95,, | Performed by: PHYSICIAN ASSISTANT

## 2022-08-08 PROCEDURE — 1160F PR REVIEW ALL MEDS BY PRESCRIBER/CLIN PHARMACIST DOCUMENTED: ICD-10-PCS | Mod: CPTII,95,, | Performed by: PHYSICIAN ASSISTANT

## 2022-08-08 PROCEDURE — 99215 PR OFFICE/OUTPT VISIT, EST, LEVL V, 40-54 MIN: ICD-10-PCS | Mod: 95,,, | Performed by: PHYSICIAN ASSISTANT

## 2022-08-08 PROCEDURE — 99215 OFFICE O/P EST HI 40 MIN: CPT | Mod: 95,,, | Performed by: PHYSICIAN ASSISTANT

## 2022-08-08 RX ORDER — LACTULOSE 10 G/15ML
SOLUTION ORAL
Qty: 2700 ML | Refills: 5 | Status: SHIPPED | OUTPATIENT
Start: 2022-08-08 | End: 2023-06-05 | Stop reason: SDUPTHER

## 2022-08-08 RX ORDER — ONDANSETRON 4 MG/1
4 TABLET, FILM COATED ORAL EVERY 12 HOURS PRN
Qty: 40 TABLET | Refills: 0 | Status: SHIPPED | OUTPATIENT
Start: 2022-08-08 | End: 2023-03-23

## 2022-08-08 RX ORDER — PANTOPRAZOLE SODIUM 40 MG/1
40 TABLET, DELAYED RELEASE ORAL 2 TIMES DAILY
Qty: 60 TABLET | Refills: 2 | Status: SHIPPED | OUTPATIENT
Start: 2022-08-08 | End: 2023-03-23

## 2022-08-08 NOTE — PROGRESS NOTES
"HEPATOLOGY VIDEO VISIT NOTE - HCV clinic  The patient location is: at work  Visit type: audiovisual  (Used Doximity due to technical issues w/ MyChart)    Each patient to whom he or she provides medical services by telemedicine is:  (1) informed of the relationship between the physician and patient and the respective role of any other health care provider with respect to management of the patient; and (2) notified that he or she may decline to receive medical services by telemedicine and may withdraw from such care at any time.    CHIEF COMPLAINT: HCV Cirrhosis    HISTORY       This is a 47 y.o. White female with mildly decompensated (low MELD) cirrhosis due to HCV (treated / cured) and alcohol        Interval history:  Completed HCV rx - now cured  Missed f/u earlier this year due to incarceration x 3 months    Routine cirrhosis labs / imaging  · U/S 7/27/22 - no concerning liver lesions, spleen normal. No ascites  · Labs 7/27/22 - stable CBC, CMP, INR. Normal AFP. Peth 494    Current symptoms of hepatic decompensation:  · Ascites:              none  · TBili elevation:   none  · HE:                    yes. Mild confusion, altered sleep cycle.   Takes "swig or 2 of lactulose daily. Has BM 1-2x per week.    · EV bleed:           none    EGD 5/2021 - Grade C esophagitis (while obnb HCV Rx)  PPI BID (could not start until after HCV Rx finished) + f/u EGD to assess for healing was recommended  Presently not on any acid suppression.  Notes daily nausea w/ some vomiting. Significant GERD    Notes she stopped drinking 2 weeks ago. Alcohol was worsening her GERD.   Staying busy which is helping  Finds AA not helpful "makes me want to drink more"  Starting to believe that life may be worth living     HCV history:  S/p 24 wks epclusa w/ SVR documented 1/2022 (cured)  - Treatment naive prior to epclusa  - Genotype 1a     Cirrhosis history:  Mildly decompensated, but stable: Hx of trace ascites on prior imaging, no para. (+) " HE. Prior TBili elevations  (+) evidence portal HTN: (+) PHG. No EV. plt and spleen normal     MELD-Na score: 7 at 7/27/2022  8:13 AM  MELD score: 7 at 7/27/2022  8:13 AM  Calculated from:  Serum Creatinine: 0.6 mg/dL (Using min of 1 mg/dL) at 7/27/2022  8:13 AM  Serum Sodium: 142 mmol/L (Using max of 137 mmol/L) at 7/27/2022  8:13 AM  Total Bilirubin: 0.5 mg/dL (Using min of 1 mg/dL) at 7/27/2022  8:13 AM  INR(ratio): 1.1 at 7/27/2022  8:13 AM  Age: 47 years     Cirrhosis health maintenance:  - HCC screening - up to date 7/2022  - Varices screening -  EGD 5/2021 - no EV, (+) PHG      PMH, PSH, SOCIAL HX, FAMILY HX      Reviewed in Epic  Pertinent findings:  FAMILY HX: neg for liver diease  SOCIAL HX:  Alcohol - yes. Stopped 2 wks ago  Drugs - prior use      ROS:   Review of Systems   Respiratory: Negative for shortness of breath.    Cardiovascular: Positive for leg swelling (ankle edema). Negative for chest pain.   Gastrointestinal: Positive for heartburn and nausea. Negative for melena.   Neurological: Positive for dizziness.       PHYSICAL EXAM:  Friendly White female, in no acute distress; alert and oriented to person, place and time  LUNGS: Normal respiratory effort.  NEURO/PSYCH: Memory intact. Thought and speech pattern appropriate. Behavior normal. No depression or anxiety noted.      PERTINENT DIAGNOSTIC RESULTS      Lab Results   Component Value Date    WBC 4.66 07/27/2022    HGB 13.4 07/27/2022     07/27/2022     Lab Results   Component Value Date    INR 1.1 07/27/2022     Lab Results   Component Value Date    AST 31 07/27/2022    ALT 19 07/27/2022    BILITOT 0.5 07/27/2022    ALBUMIN 3.7 07/27/2022    ALKPHOS 96 07/27/2022    CREATININE 0.6 07/27/2022    BUN 8 07/27/2022     07/27/2022    K 3.5 07/27/2022    AFP 2.8 07/27/2022       US Abdomen Limited - 7/27/22  Narrative  CLINICAL HISTORY:Unspecified cirrhosis of liver  TECHNIQUE:Limited ultrasound of the right upper quadrant of the abdomen  (including pancreas, liver, gallbladder, common bile duct, and spleen) was performed.  COMPARISON:September 14, 2021    FINDINGS:  The gallbladder is somewhat contracted but no definite gallstones are seen.  Mild fatty liver infiltration is suggested.  There is no evidence of biliary ductal dilatation with the common bile duct measuring 4 mm.  The right lobe of the liver is elongated at 18 cm in craniocaudal dimensions.  There is a benign-appearing 2.3 x 1.2 x 1.5 cm left lobe hepatic cyst.  This is unchanged.  The spleen measures 10.6 cm.    Impression  Mild hepatomegaly and fatty liver infiltration.  Stable benign-appearing hepatic cyst.      ASSESSMENT        47 y.o. White female with:  1. HISTORY OF HEPATITIS C, GENOTYPE 1a - cured  -- s/p 24 wks Epclusa w/ SVR 1/2022     2. DECOMPENSATED CIRRHOSIS, stable  -- MELD score 7  -- HCC screening - up to date 7/2022  -- Varices screening - EGD 5/2021 - no EV, (+) PHG  -- hepatic encephalopathy - needs to increase lactulose  -- prior resolved HBV  -- lacking immunity to HAV    3. GERD, GRADE C ESOPHAGITIS ON PRIOR EGD    4. HX OF ALCOHOL ABUSE  -- none x 2 weeks        PLAN      1.  Begin Protonix 40mg BID x 12 weeks (per prior GI recs).   - Will need EGD to assess for healing afterwards  - Zofran PRN nausea     2. Increase lactulose - dose titration reviewed. Call if continued problems    3. F/U visit 3 months    4. CBC, CMP, INR, AFP, U/S, VISIT due 1/2023    Encouraged continued alcohol abstinence    __________________________________________________________________    Duration of encounter: 48 min  This includes face-to-face time and non face-to-face time preparing to see the patient (eg, review of tests), obtaining and/or reviewing separately obtained history, documenting clinical information in the electronic or other health record, independently interpreting resultsand communicating results to the patient/family/caregiver, or care coordination.

## 2022-08-08 NOTE — TELEPHONE ENCOUNTER
----- Message from Jennifer B. Scheuermann, PA sent at 8/8/2022  9:30 AM CDT -----  Also, can you updated medlist when you call her. I just prescribed lactulose, protonix bid, zofran.

## 2022-08-08 NOTE — Clinical Note
Also, can you updated medlist when you call her. I just prescribed lactulose, protonix bid, zofran.

## 2022-08-08 NOTE — TELEPHONE ENCOUNTER
PA Scheuermann ordered that patient's medication list be updated.  I spoke with patient this morning.  She stated that she will call back to update list.  Attempt made to reach her again this evening.  LVM asking that she call back to update list.

## 2022-08-08 NOTE — TELEPHONE ENCOUNTER
I spoke with patient.  F/u scheduled 11/21/22.  F/u with testing scheduled 1/17/23; appt reminder notices mailed.

## 2022-08-08 NOTE — TELEPHONE ENCOUNTER
----- Message from Jennifer B. Scheuermann, PA sent at 8/8/2022  9:29 AM CDT -----  Pls schedule: 1.) visit in 3 months. 2.) CBC, CMP, INR, AFP, U/S, VISIT due 1/2023

## 2022-08-22 ENCOUNTER — HOSPITAL ENCOUNTER (EMERGENCY)
Facility: HOSPITAL | Age: 47
Discharge: HOME | End: 2022-08-22
Attending: EMERGENCY MEDICINE
Payer: MEDICAID

## 2022-08-22 VITALS
SYSTOLIC BLOOD PRESSURE: 125 MMHG | BODY MASS INDEX: 23.19 KG/M2 | DIASTOLIC BLOOD PRESSURE: 69 MMHG | HEART RATE: 75 BPM | TEMPERATURE: 98 F | OXYGEN SATURATION: 96 % | HEIGHT: 62 IN | RESPIRATION RATE: 16 BRPM | WEIGHT: 126 LBS

## 2022-08-22 DIAGNOSIS — K52.9 COLITIS: Primary | ICD-10-CM

## 2022-08-22 LAB
ALBUMIN SERPL BCP-MCNC: 4.3 G/DL (ref 3.5–5.2)
ALP SERPL-CCNC: 106 U/L (ref 55–135)
ALT SERPL W/O P-5'-P-CCNC: 35 U/L (ref 10–44)
ANION GAP SERPL CALC-SCNC: 12 MMOL/L (ref 8–16)
AST SERPL-CCNC: 48 U/L (ref 10–40)
BASOPHILS # BLD AUTO: 0.06 K/UL (ref 0–0.2)
BASOPHILS NFR BLD: 0.5 % (ref 0–1.9)
BILIRUB SERPL-MCNC: 0.9 MG/DL (ref 0.1–1)
BUN SERPL-MCNC: 13 MG/DL (ref 6–20)
CALCIUM SERPL-MCNC: 10 MG/DL (ref 8.7–10.5)
CHLORIDE SERPL-SCNC: 102 MMOL/L (ref 95–110)
CO2 SERPL-SCNC: 25 MMOL/L (ref 23–29)
CREAT SERPL-MCNC: 0.8 MG/DL (ref 0.5–1.4)
DIFFERENTIAL METHOD: ABNORMAL
EOSINOPHIL # BLD AUTO: 0.1 K/UL (ref 0–0.5)
EOSINOPHIL NFR BLD: 0.6 % (ref 0–8)
ERYTHROCYTE [DISTWIDTH] IN BLOOD BY AUTOMATED COUNT: 13.2 % (ref 11.5–14.5)
EST. GFR  (NO RACE VARIABLE): >60 ML/MIN/1.73 M^2
ETHANOL SERPL-MCNC: <10 MG/DL
GLUCOSE SERPL-MCNC: 124 MG/DL (ref 70–110)
HCT VFR BLD AUTO: 38.5 % (ref 37–48.5)
HGB BLD-MCNC: 13.7 G/DL (ref 12–16)
IMM GRANULOCYTES # BLD AUTO: 0.05 K/UL (ref 0–0.04)
IMM GRANULOCYTES NFR BLD AUTO: 0.4 % (ref 0–0.5)
LIPASE SERPL-CCNC: 71 U/L (ref 4–60)
LYMPHOCYTES # BLD AUTO: 1.6 K/UL (ref 1–4.8)
LYMPHOCYTES NFR BLD: 13 % (ref 18–48)
MCH RBC QN AUTO: 33.8 PG (ref 27–31)
MCHC RBC AUTO-ENTMCNC: 35.6 G/DL (ref 32–36)
MCV RBC AUTO: 95 FL (ref 82–98)
MONOCYTES # BLD AUTO: 0.4 K/UL (ref 0.3–1)
MONOCYTES NFR BLD: 2.9 % (ref 4–15)
NEUTROPHILS # BLD AUTO: 10.2 K/UL (ref 1.8–7.7)
NEUTROPHILS NFR BLD: 82.6 % (ref 38–73)
NRBC BLD-RTO: 0 /100 WBC
PLATELET # BLD AUTO: 225 K/UL (ref 150–450)
PMV BLD AUTO: 9.3 FL (ref 9.2–12.9)
POTASSIUM SERPL-SCNC: 3.1 MMOL/L (ref 3.5–5.1)
PROT SERPL-MCNC: 7.9 G/DL (ref 6–8.4)
RBC # BLD AUTO: 4.05 M/UL (ref 4–5.4)
SODIUM SERPL-SCNC: 139 MMOL/L (ref 136–145)
WBC # BLD AUTO: 12.27 K/UL (ref 3.9–12.7)

## 2022-08-22 PROCEDURE — 96374 THER/PROPH/DIAG INJ IV PUSH: CPT | Mod: 59

## 2022-08-22 PROCEDURE — 25500020 PHARM REV CODE 255

## 2022-08-22 PROCEDURE — 63600175 PHARM REV CODE 636 W HCPCS: Performed by: EMERGENCY MEDICINE

## 2022-08-22 PROCEDURE — 82077 ASSAY SPEC XCP UR&BREATH IA: CPT | Performed by: EMERGENCY MEDICINE

## 2022-08-22 PROCEDURE — 86803 HEPATITIS C AB TEST: CPT | Performed by: EMERGENCY MEDICINE

## 2022-08-22 PROCEDURE — 80053 COMPREHEN METABOLIC PANEL: CPT | Performed by: EMERGENCY MEDICINE

## 2022-08-22 PROCEDURE — 96375 TX/PRO/DX INJ NEW DRUG ADDON: CPT

## 2022-08-22 PROCEDURE — 85025 COMPLETE CBC W/AUTO DIFF WBC: CPT | Performed by: EMERGENCY MEDICINE

## 2022-08-22 PROCEDURE — 25000003 PHARM REV CODE 250: Performed by: EMERGENCY MEDICINE

## 2022-08-22 PROCEDURE — 36415 COLL VENOUS BLD VENIPUNCTURE: CPT | Performed by: EMERGENCY MEDICINE

## 2022-08-22 PROCEDURE — 83690 ASSAY OF LIPASE: CPT | Performed by: EMERGENCY MEDICINE

## 2022-08-22 PROCEDURE — 99285 EMERGENCY DEPT VISIT HI MDM: CPT | Mod: 25

## 2022-08-22 PROCEDURE — 87389 HIV-1 AG W/HIV-1&-2 AB AG IA: CPT | Performed by: EMERGENCY MEDICINE

## 2022-08-22 RX ORDER — HYDROCODONE BITARTRATE AND ACETAMINOPHEN 5; 325 MG/1; MG/1
1 TABLET ORAL EVERY 4 HOURS PRN
Qty: 12 TABLET | Refills: 0 | Status: SHIPPED | OUTPATIENT
Start: 2022-08-22 | End: 2022-09-01

## 2022-08-22 RX ORDER — ESCITALOPRAM OXALATE 10 MG/1
10 TABLET ORAL DAILY
COMMUNITY
Start: 2022-05-20 | End: 2024-02-14 | Stop reason: SDUPTHER

## 2022-08-22 RX ORDER — CIPROFLOXACIN 500 MG/1
500 TABLET ORAL 2 TIMES DAILY
Qty: 20 TABLET | Refills: 0 | Status: SHIPPED | OUTPATIENT
Start: 2022-08-22 | End: 2022-09-01

## 2022-08-22 RX ORDER — ONDANSETRON 4 MG/1
4 TABLET, ORALLY DISINTEGRATING ORAL EVERY 8 HOURS PRN
Qty: 12 TABLET | Refills: 0 | Status: SHIPPED | OUTPATIENT
Start: 2022-08-22 | End: 2023-03-23

## 2022-08-22 RX ORDER — HYDROXYZINE PAMOATE 25 MG/1
25 CAPSULE ORAL 2 TIMES DAILY PRN
COMMUNITY
Start: 2022-05-15 | End: 2023-03-23

## 2022-08-22 RX ORDER — DICYCLOMINE HYDROCHLORIDE 20 MG/1
20 TABLET ORAL 2 TIMES DAILY
Qty: 20 TABLET | Refills: 0 | Status: SHIPPED | OUTPATIENT
Start: 2022-08-22 | End: 2022-09-21

## 2022-08-22 RX ORDER — MORPHINE SULFATE 2 MG/ML
6 INJECTION, SOLUTION INTRAMUSCULAR; INTRAVENOUS
Status: COMPLETED | OUTPATIENT
Start: 2022-08-22 | End: 2022-08-22

## 2022-08-22 RX ORDER — METRONIDAZOLE 500 MG/1
500 TABLET ORAL 3 TIMES DAILY
Qty: 21 TABLET | Refills: 0 | Status: SHIPPED | OUTPATIENT
Start: 2022-08-22 | End: 2022-08-29

## 2022-08-22 RX ORDER — ONDANSETRON 2 MG/ML
8 INJECTION INTRAMUSCULAR; INTRAVENOUS
Status: COMPLETED | OUTPATIENT
Start: 2022-08-22 | End: 2022-08-22

## 2022-08-22 RX ADMIN — ONDANSETRON 8 MG: 2 INJECTION INTRAMUSCULAR; INTRAVENOUS at 05:08

## 2022-08-22 RX ADMIN — MORPHINE SULFATE 6 MG: 2 INJECTION, SOLUTION INTRAMUSCULAR; INTRAVENOUS at 06:08

## 2022-08-22 RX ADMIN — IOHEXOL 75 ML: 350 INJECTION, SOLUTION INTRAVENOUS at 05:08

## 2022-08-22 RX ADMIN — SODIUM CHLORIDE 1000 ML: 0.9 INJECTION, SOLUTION INTRAVENOUS at 05:08

## 2022-08-22 NOTE — ED PROVIDER NOTES
Encounter Date: 8/22/2022       History     Chief Complaint   Patient presents with    Abdominal Pain     And constipation and vomiting since midnight     Patient is a 47-year-old female with a past medical history of hepatitis C cured after antiviral therapy cirrhosis anemia former alcohol abuse smoker marijuana abuse who presents to the emergency room for evaluation of epigastric abdominal pain that started after she had lower abdominal pain with vomiting.  Patient states she was constipated and lower abdominal pain followed by some vomiting which triggered epigastric abdominal pain.  Pain was getting worse therefore she given by EMS.  Pain does not radiate towards her back or her flank.  She has no dysuria hematuria bloody stools hematemesis cough cold congestion runny nose sore throat fevers urinary complaints.  She still has her gallbladder.  She occasionally has a drink but nothing recently.        Review of patient's allergies indicates:  No Known Allergies  Past Medical History:   Diagnosis Date    Addiction to drug     Alcohol abuse     Allergy     Anemia     Cirrhosis     Hepatitis C virus infection cured after antiviral drug therapy     s/p 24 wks epclusa - treated / cured. (SVR12 - 11/2021)    History of psychiatric hospitalization     alcohol related    Hx of psychiatric care     Meningitis     Psychiatric problem      Past Surgical History:   Procedure Laterality Date    ESOPHAGOGASTRODUODENOSCOPY N/A 5/10/2021    Procedure: EGD (ESOPHAGOGASTRODUODENOSCOPY);  Surgeon: Juan Manuel Sahni MD;  Location: [facility];  Service: Endoscopy;  Laterality: N/A;    TUBAL LIGATION       Family History   Problem Relation Age of Onset    Alcohol abuse Mother     Kidney disease Mother     Heart disease Father      Social History     Tobacco Use    Smoking status: Current Every Day Smoker     Packs/day: 0.50     Types: Cigarettes    Smokeless tobacco: Never Used   Substance Use Topics    Alcohol use:  Not Currently     Comment: 1 pint of Whiskey per day.     Drug use: Yes     Types: Marijuana     Comment: history of heroin and cocaine     Review of Systems   Constitutional: Negative for fatigue and fever.   HENT: Negative for ear pain, rhinorrhea and sore throat.    Eyes: Negative for pain and visual disturbance.   Respiratory: Negative for cough and shortness of breath.    Cardiovascular: Negative for chest pain.   Gastrointestinal: Positive for abdominal pain, constipation, nausea and vomiting. Negative for diarrhea.   Genitourinary: Negative for difficulty urinating and dysuria.   Musculoskeletal: Negative for arthralgias.   Skin: Negative for rash.   Neurological: Negative for weakness, numbness and headaches.   All other systems reviewed and are negative.      Physical Exam     Initial Vitals   BP Pulse Resp Temp SpO2   08/22/22 0514 08/22/22 0514 08/22/22 0514 08/22/22 0514 08/22/22 0632   (!) 144/81 (!) 59 18 98.4 °F (36.9 °C) 96 %      MAP       --                Physical Exam    Nursing note and vitals reviewed.  Constitutional: She appears well-developed and well-nourished.  Non-toxic appearance. No distress.   HENT:   Head: Normocephalic and atraumatic.   Mouth/Throat: Oropharynx is clear and moist.   Eyes: Conjunctivae and EOM are normal. Pupils are equal, round, and reactive to light.   Neck: Neck supple.   Normal range of motion.  Cardiovascular: Normal rate, regular rhythm, normal heart sounds and intact distal pulses. Exam reveals no gallop and no friction rub.    No murmur heard.  Pulmonary/Chest: Breath sounds normal. No respiratory distress. She has no decreased breath sounds. She has no wheezes. She has no rhonchi. She has no rales.   Abdominal: Abdomen is soft. Bowel sounds are normal. She exhibits no distension. There is abdominal tenderness (Midepigastric and right upper quadrant). There is no rebound and no guarding.   Musculoskeletal:         General: No edema. Normal range of motion.       Cervical back: Normal range of motion and neck supple.     Neurological: She is alert and oriented to person, place, and time. She has normal strength. GCS score is 15. GCS eye subscore is 4. GCS verbal subscore is 5. GCS motor subscore is 6.   Skin: Skin is warm and dry. No rash noted.   Psychiatric: She has a normal mood and affect.         ED Course   Procedures  Labs Reviewed   CBC W/ AUTO DIFFERENTIAL - Abnormal; Notable for the following components:       Result Value    MCH 33.8 (*)     Gran # (ANC) 10.2 (*)     Immature Grans (Abs) 0.05 (*)     Gran % 82.6 (*)     Lymph % 13.0 (*)     Mono % 2.9 (*)     All other components within normal limits    Narrative:     Release to patient->Immediate   COMPREHENSIVE METABOLIC PANEL - Abnormal; Notable for the following components:    Potassium 3.1 (*)     Glucose 124 (*)     AST 48 (*)     All other components within normal limits    Narrative:     Release to patient->Immediate   LIPASE - Abnormal; Notable for the following components:    Lipase 71 (*)     All other components within normal limits    Narrative:     Release to patient->Immediate   ALCOHOL,MEDICAL (ETHANOL)    Narrative:     Release to patient->Immediate   HIV 1 / 2 ANTIBODY   HEPATITIS C ANTIBODY          Imaging Results          CT Abdomen Pelvis With Contrast (Final result)  Result time 08/22/22 08:15:51    Final result by Cintia Tony MD (08/22/22 08:15:51)                 Impression:      The findings are consistent with mild colitis    Additional findings as detailed above including stable appearance of the low-density lesion in the liver, diffuse fatty infiltration of the liver, decrease of the size of the splenic lesion.    Final read      Electronically signed by: Cintia Tony MD  Date:    08/22/2022  Time:    08:15             Narrative:    EXAMINATION:  CT ABDOMEN PELVIS WITH CONTRAST    CLINICAL HISTORY:  Epigastric pain;    TECHNIQUE:  Low dose axial images, sagittal and coronal  reformations were obtained from the lung bases to the pubic symphysis following the IV administration of 75 mL of Omnipaque 350 and without PO contrast    COMPARISON:  10/08/2021    FINDINGS:  Very mild dependent hypoventilatory changes in the visualized lung bases    Liver;   diffuse fatty infiltration.  1.5 cm low-density area anteriorly near the falciform ligament remains not significantly changed compared to the prior exam or earlier study 10/04/2018.    Gallbladder and bile ducts; no calcified stones in the gallbladder or CT findings of acute cholecystitis.  No biliary duct dilatation    Spleen; not enlarged.  1.3 cm low-density lesion caudally in the spleen decreased in size compared to the prior exam.    Adrenal glands; unremarkable appearance    Pancreas; unremarkable appearance    Abdominal aorta;    no aneurysm    Stomach, bowel, mesentery; circumferential wall thickening of a long segment of colon ascending to sigmoid colon suggesting colitis.  No pericolonic fat stranding or fluid.  No free intraperitoneal air or fluid.  No abscess.  Normal appearance of the appendix.    Kidneys, ureters, bladder; symmetrical renal enhancement and kidneys unremarkable appearance.  No hydronephrosis.  Urinary bladder decompressed at the time of the exam and as visualized unremarkable appearance    Reproductive organs; unremarkable appearance for the patient's age    Osseous structures show mild degenerative changes                                 Medications   ondansetron injection 8 mg (8 mg Intravenous Given 8/22/22 0547)   sodium chloride 0.9% bolus 1,000 mL (0 mLs Intravenous Stopped 8/22/22 0647)   iohexoL (OMNIPAQUE 350) 350 mg iodine/mL injection (75 mLs  Given 8/22/22 0548)   morphine injection 6 mg (6 mg Intravenous Given 8/22/22 0604)                 ED Course as of 08/23/22 0703   Mon Aug 22, 2022   0648 Patient appears have colitis.  She feels much better after medications given here in the emergency room.  I  will start her on Cipro Flagyl Zofran Bentyl and short course of pain medicine.  Return precautions have been given.  Stable for discharge follow up primary care.  I do not believe this is ischemic in nature given that she did have any bloody stools.  She has no significant leukocytosis.  Her vitals are stable.  Given that she feels better feel that she is a outpatient candidate for management.  Refer to gastroenterology [JS]      ED Course User Index  [JS] Ag Pyle MD             Clinical Impression:   Final diagnoses:  [K52.9] Colitis (Primary)          ED Disposition Condition    Discharge Stable        ED Prescriptions     Medication Sig Dispense Start Date End Date Auth. Provider    ciprofloxacin HCl (CIPRO) 500 MG tablet Take 1 tablet (500 mg total) by mouth 2 (two) times daily. for 10 days 20 tablet 8/22/2022 9/1/2022 Ag Pyle MD    metroNIDAZOLE (FLAGYL) 500 MG tablet Take 1 tablet (500 mg total) by mouth 3 (three) times daily. for 7 days 21 tablet 8/22/2022 8/29/2022 Ag Pyle MD    dicyclomine (BENTYL) 20 mg tablet Take 1 tablet (20 mg total) by mouth 2 (two) times daily. 20 tablet 8/22/2022 9/21/2022 Ag Pyle MD    ondansetron (ZOFRAN-ODT) 4 MG TbDL Take 1 tablet (4 mg total) by mouth every 8 (eight) hours as needed. 12 tablet 8/22/2022  Ag Pyle MD    HYDROcodone-acetaminophen (NORCO) 5-325 mg per tablet Take 1 tablet by mouth every 4 (four) hours as needed for Pain. 12 tablet 8/22/2022 9/1/2022 Ag Pyle MD        Follow-up Information     Follow up With Specialties Details Why Contact Info    [facility] Schedule an appointment as soon as possible for a visit   [address] [address]  [phone]             Ag Pyle MD  08/23/22 9920

## 2022-08-22 NOTE — ED NOTES
Pt presents with complaints of abdominal pain with associated vomiting and diarrhea that started suddenly last night. Has had about 10 episodes of diarrhea and 3 episodes of vomiting. Abdomen is soft and tender to palpation all over. Bowel sounds active in lower quadrants bilaterally and decreased in upper quadrants. Lungs clear to auscultation. Pt smells of alcohol but denies having any for a couple of days. Wanting water but advised to be NPO. Pt alert and oriented with neuro intact.

## 2022-08-23 ENCOUNTER — PATIENT MESSAGE (OUTPATIENT)
Dept: HEPATOLOGY | Facility: CLINIC | Age: 47
End: 2022-08-23
Payer: MEDICAID

## 2022-08-23 LAB
HCV AB SERPL QL IA: POSITIVE
HIV 1+2 AB+HIV1 P24 AG SERPL QL IA: NEGATIVE

## 2022-08-24 NOTE — TELEPHONE ENCOUNTER
ER note reviewed which specifically mentioned no blood  Her [facility] msg now states she's having rectal bleeding    pls call  Tell her that I think she should return to ER if symptoms have continued / worsened in that she's now passing blood.    Ultimately she needs a GI evaluation but if passing only blood (as her note indicates) now she should go back to ER     thanks

## 2022-08-24 NOTE — TELEPHONE ENCOUNTER
I spoke with patient and msg from PA Scheuermann relayed.  She states that she will go back to [facility] ER today.

## 2022-08-25 ENCOUNTER — PATIENT OUTREACH (OUTPATIENT)
Dept: EMERGENCY MEDICINE | Facility: HOSPITAL | Age: 47
End: 2022-08-25
Payer: MEDICAID

## 2022-08-30 ENCOUNTER — PATIENT OUTREACH (OUTPATIENT)
Dept: EMERGENCY MEDICINE | Facility: HOSPITAL | Age: 47
End: 2022-08-30
Payer: MEDICAID

## 2022-08-30 NOTE — PROGRESS NOTES
Natalie Harris  ED Navigator  Emergency Department    Project: [facility] ED Navigator  Role: Community Health Worker    Date: 08/30/2022  Patient Name: Carmen Landon  MRN: [id number]  PCP: [facility]comb    Assessment:     Carmen Landon is a 47 y.o. female who has presented to ED for abdominal pain. Patient has visited the ED 1 times in the past 3 months. Patient did not contact PCP.     ED Navigator Initial Assessment    ED Navigator Enrollment Documentation  Consent to Services  Does patient consent to completing the assessment?: Yes  Contact  Method of Initial Contact: Phone  Transportation  Does the patient have issues with Transportation?: Yes  Does the patient have transportation to and from healthcare appointments?: No  Can family, friends, or others help?: Yes  Lack of transportation to appts, pharmacy, etc.?: Yes  What type of assistance is needed?: Standard (RTA/MITS)  What is available in their region?: Medicaid Medical transportation  Insurance Coverage  Do you have coverage/adequate coverage?: Yes  Type/kind of coverage: Medicaid/C  Is patient able to afford co-pays/deductibles?: Yes  Is patient able to afford HME or supplies?: Yes  Does patient have an established [facility] PCP?: Yes  Able to access?: Yes  Does the patient have a lack of adequate coverage?: No  Specialist Appointment  Did the patient come to the ED to see a specialist?: No  Does the patient have a pending specialist referral?: No  Does the patient have a specialist appointment made?: No  PCP Follow Up Appointment  Has the patient had an appointment with a primary care provider in the past year?: No  Does the patient have a follow up appontment with a PCP?: No  When was the last time you saw your PCP?: 8/30/21  Why does the patient not have a follow up scheduled?: Other (see comments)  Medications  Is patient able to afford medication?: Yes  Is patient unable to get medication due to lack of transportation?:  Yes  Psychological  Does the patient have psycho-social concerns?: Yes  What concerns does the patient have?: Substance abuse/Substance abuse disorder  What substances did the patient identify as issues?: Alcohol  Food  Does the patient have concerns about food?: No  Communication/Education  Does the patient have limited English proficiency/English not primary language?: No  Does patient have low literacy and/or low health literacy?: Yes  Does patient have concerns with care?: No  Does patient have dissatisfaction with care?: No  Other Financial Concerns  Does the patient have immediate financial distress?: No  Does the patient have general financial concerns?: Yes  Other Social Barriers/Concerns  Does the patient have any additional barriers or concerns?: Unable to afford utilities, Other (see comments)  Primary Barrier  Barriers identified: Patient identified no barriers to care  Root Cause of ED Utilization: Patient Knowledge/Low Health Literacy  Plan to address Patient Knowledge/Low Health Literacy: Provided information for [facility] 24/7 Nurse triage line [phone] or [facility] ([phone])  Next steps: Provided Education  Was education/educational materials provided surrounding PCP services/creating a medical home?: Yes Was education verbal or written?: Written     Was education/educational materials provided surrounding low cost, healthy foods?: Yes Was education verbal or written?: Written     Was education/educational materials provided surrounding other items? If so, use comment to explain.: Yes Was education verbal or written?: Written   Plan: Utility payment assistance resource given for their region  Expected Date of Follow Up 1: 9/27/22               Plan:   ED Navigator spoke with patient on the telephone and completed initial enrollment.  Patient reported multiple concerns, including lack of transportation, food insecurities, and difficulty paying monthly bills.  Patient said she  "applied for food stamps but never heard back from them.  ED Navigator encouraged her to apply again and follow up.  Patient was not aware that Medicaid would provide transportation to routine doctor appointments.  Patient stated she has a PCP but has not seen her in over a year.  ED Navigator encouraged her to follow up for an annual physical.  Patient would like information on gynecologists who take her insurance.  Patient reported she is a smoker, and when asked if she feels like drinking is a problem, patient responded, "Absolutely."  Patient was given education on ([facility] Right [facility] information, [facility] information, and Heart healthy diet tips), [facility] Nurse Triage line, [facility], food stamp application, list of food pantries, list of organizations that offer financial assistance with daily living expenese, Medicaid Medical transportation, [facility] Recovery program, and list of behavior health/gynecology providers from the [facility] web site.    Natalie Harris  ED Navigator    "

## 2022-09-27 ENCOUNTER — PATIENT OUTREACH (OUTPATIENT)
Dept: EMERGENCY MEDICINE | Facility: HOSPITAL | Age: 47
End: 2022-09-27
Payer: MEDICAID

## 2022-09-27 ENCOUNTER — HOSPITAL ENCOUNTER (EMERGENCY)
Facility: HOSPITAL | Age: 47
Discharge: LEFT BEFORE DISCHARGE | End: 2022-09-27
Attending: EMERGENCY MEDICINE
Payer: MEDICAID

## 2022-09-27 VITALS
WEIGHT: 128 LBS | HEIGHT: 62 IN | HEART RATE: 78 BPM | DIASTOLIC BLOOD PRESSURE: 95 MMHG | OXYGEN SATURATION: 99 % | SYSTOLIC BLOOD PRESSURE: 143 MMHG | RESPIRATION RATE: 16 BRPM | BODY MASS INDEX: 23.55 KG/M2 | TEMPERATURE: 98 F

## 2022-09-27 DIAGNOSIS — R20.2 TINGLING: Primary | ICD-10-CM

## 2022-09-27 PROCEDURE — 99283 EMERGENCY DEPT VISIT LOW MDM: CPT

## 2022-09-27 NOTE — ED PROVIDER NOTES
Encounter Date: 9/27/2022       History     Chief Complaint   Patient presents with    Tingling     Pt reports tingling to lips and bilateral hands x 2 days     I went into patient room to evaluate her however patient noted to be on the phone and she requested that I wait because she is on aimportant phone call.    Patient's nurse checked on her 10 minutes later and was told the same thing.    I went into room to check on patient again and patient's can found to be on the bed and patient not in room.    Review of patient's allergies indicates:  No Known Allergies  Past Medical History:   Diagnosis Date    Addiction to drug     Alcohol abuse     Allergy     Anemia     Cirrhosis     Hepatitis C virus infection cured after antiviral drug therapy     s/p 24 wks epclusa - treated / cured. (SVR12 - 11/2021)    History of psychiatric hospitalization     alcohol related    Hx of psychiatric care     Meningitis     Psychiatric problem      Past Surgical History:   Procedure Laterality Date    ESOPHAGOGASTRODUODENOSCOPY N/A 5/10/2021    Procedure: EGD (ESOPHAGOGASTRODUODENOSCOPY);  Surgeon: Juan Manuel Sahni MD;  Location: [facility];  Service: Endoscopy;  Laterality: N/A;    TUBAL LIGATION       Family History   Problem Relation Age of Onset    Alcohol abuse Mother     Kidney disease Mother     Heart disease Father      Social History     Tobacco Use    Smoking status: Every Day     Packs/day: 0.50     Types: Cigarettes    Smokeless tobacco: Never   Substance Use Topics    Alcohol use: Not Currently     Comment: 1 pint of Whiskey per day.     Drug use: Yes     Types: Marijuana     Comment: history of heroin and cocaine     Review of Systems    Physical Exam     Initial Vitals [09/27/22 1718]   BP Pulse Resp Temp SpO2   (!) 143/95 78 16 98.3 °F (36.8 °C) 99 %      MAP       --         Physical Exam    ED Course   Procedures  Labs Reviewed - No data to display       Imaging Results    None          Medications - No data to  display                           Clinical Impression:   Final diagnoses:  [R20.2] Tingling (Primary)      ED Disposition Condition    Eloped Stable                Ronny Li MD  09/27/22 6662

## 2022-11-23 ENCOUNTER — TELEPHONE (OUTPATIENT)
Dept: HEPATOLOGY | Facility: CLINIC | Age: 47
End: 2022-11-23
Payer: MEDICAID

## 2022-11-23 NOTE — TELEPHONE ENCOUNTER
Patient was a no-show for scheduled appt with PA Scheuermann on 11/21/22.  Letter sent asking that she call hepatology back for rescheduling.

## 2023-01-18 ENCOUNTER — TELEPHONE (OUTPATIENT)
Dept: HEPATOLOGY | Facility: CLINIC | Age: 48
End: 2023-01-18
Payer: MEDICAID

## 2023-01-18 NOTE — TELEPHONE ENCOUNTER
Patient was a no-show for scheduled appt with PA Scheuermann and hcc testing on 1/17/23.  Attempt made to reach her for rescheduling.  LVM asking that she call hepatology back.

## 2023-01-27 ENCOUNTER — HOSPITAL ENCOUNTER (EMERGENCY)
Facility: HOSPITAL | Age: 48
Discharge: HOME | End: 2023-01-27
Attending: EMERGENCY MEDICINE
Payer: MEDICAID

## 2023-01-27 VITALS
TEMPERATURE: 98 F | HEART RATE: 92 BPM | OXYGEN SATURATION: 100 % | HEIGHT: 62 IN | BODY MASS INDEX: 23 KG/M2 | WEIGHT: 125 LBS | DIASTOLIC BLOOD PRESSURE: 83 MMHG | RESPIRATION RATE: 16 BRPM | SYSTOLIC BLOOD PRESSURE: 127 MMHG

## 2023-01-27 DIAGNOSIS — N30.00 ACUTE CYSTITIS WITHOUT HEMATURIA: ICD-10-CM

## 2023-01-27 DIAGNOSIS — L98.8 MORGELLONS SYNDROME: ICD-10-CM

## 2023-01-27 DIAGNOSIS — R21 RASH AND NONSPECIFIC SKIN ERUPTION: Primary | ICD-10-CM

## 2023-01-27 DIAGNOSIS — F42.4 PICKING OWN SKIN: ICD-10-CM

## 2023-01-27 LAB
B-HCG UR QL: NEGATIVE
BACTERIA #/AREA URNS HPF: ABNORMAL /HPF
BILIRUB UR QL STRIP: NEGATIVE
CLARITY UR: ABNORMAL
COLOR UR: YELLOW
CTP QC/QA: YES
GLUCOSE UR QL STRIP: NEGATIVE
HGB UR QL STRIP: ABNORMAL
HYALINE CASTS #/AREA URNS LPF: 456 /LPF
KETONES UR QL STRIP: NEGATIVE
LEUKOCYTE ESTERASE UR QL STRIP: ABNORMAL
MICROSCOPIC COMMENT: ABNORMAL
NITRITE UR QL STRIP: POSITIVE
PH UR STRIP: 7 [PH] (ref 5–8)
PROT UR QL STRIP: ABNORMAL
RBC #/AREA URNS HPF: >100 /HPF (ref 0–4)
SP GR UR STRIP: 1.01 (ref 1–1.03)
SQUAMOUS #/AREA URNS HPF: 2 /HPF
URN SPEC COLLECT METH UR: ABNORMAL
UROBILINOGEN UR STRIP-ACNC: NEGATIVE EU/DL
WBC #/AREA URNS HPF: >100 /HPF (ref 0–5)

## 2023-01-27 PROCEDURE — 87077 CULTURE AEROBIC IDENTIFY: CPT | Performed by: EMERGENCY MEDICINE

## 2023-01-27 PROCEDURE — 87086 URINE CULTURE/COLONY COUNT: CPT | Performed by: EMERGENCY MEDICINE

## 2023-01-27 PROCEDURE — 81025 URINE PREGNANCY TEST: CPT | Performed by: EMERGENCY MEDICINE

## 2023-01-27 PROCEDURE — 87186 SC STD MICRODIL/AGAR DIL: CPT | Performed by: EMERGENCY MEDICINE

## 2023-01-27 PROCEDURE — 81001 URINALYSIS AUTO W/SCOPE: CPT | Performed by: EMERGENCY MEDICINE

## 2023-01-27 PROCEDURE — 99283 EMERGENCY DEPT VISIT LOW MDM: CPT

## 2023-01-27 RX ORDER — CEFUROXIME AXETIL 500 MG/1
500 TABLET ORAL EVERY 12 HOURS
Qty: 14 TABLET | Refills: 0 | Status: SHIPPED | OUTPATIENT
Start: 2023-01-27 | End: 2023-03-23

## 2023-01-27 RX ORDER — ALBENDAZOLE 200 MG/1
400 TABLET, FILM COATED ORAL 2 TIMES DAILY WITH MEALS
Qty: 20 TABLET | Refills: 0 | Status: SHIPPED | OUTPATIENT
Start: 2023-01-27 | End: 2023-02-01

## 2023-01-27 NOTE — ED PROVIDER NOTES
"Encounter Date: 1/27/2023       History     Chief Complaint   Patient presents with    Rash     Pt states she has cats that are infested with fleas, she has several bites all over her body and states "there is something coming out of them"     This is a 48-year-old female who presents emergency department with a rash.  She has a history of hepatitis-C which has been treated and alcoholic cirrhosis who currently she states is on the transplant list.  She says she recently started drinking however.  She says that she is a former drug addict and she recently unfortunately started doing methamphetamine and snorted it.  She does also smoke marijuana.  She says that she is had a rash for about 10 days 2 weeks on her arms.  She said it is itchy.  She said parasites are coming out of her skin.  She has been scratching it.  She says that she is picking at her skin.  She thinks that the cat's that she lives with are infested with fleas.  She got the catch treated but the rash still happening.  She thinks that she may have a parasite inside of her.     Review of patient's allergies indicates:  No Known Allergies  Past Medical History:   Diagnosis Date    Addiction to drug     Alcohol abuse     Allergy     Anemia     Cirrhosis     Hepatitis C virus infection cured after antiviral drug therapy     s/p 24 wks epclusa - treated / cured. (SVR12 - 11/2021)    History of psychiatric hospitalization     alcohol related    Hx of psychiatric care     Meningitis     Psychiatric problem      Past Surgical History:   Procedure Laterality Date    ESOPHAGOGASTRODUODENOSCOPY N/A 5/10/2021    Procedure: EGD (ESOPHAGOGASTRODUODENOSCOPY);  Surgeon: Juan Manuel Sahni MD;  Location: [facility];  Service: Endoscopy;  Laterality: N/A;    TUBAL LIGATION       Family History   Problem Relation Age of Onset    Alcohol abuse Mother     Kidney disease Mother     Heart disease Father      Social History     Tobacco Use    Smoking status: Every Day     " Packs/day: 0.50     Types: Cigarettes    Smokeless tobacco: Never   Substance Use Topics    Alcohol use: Not Currently     Comment: 1 pint of Whiskey per day.     Drug use: Yes     Types: Marijuana     Comment: history of heroin and cocaine     Review of Systems   Constitutional:  Negative for chills and fever.   HENT:  Negative for sore throat.    Respiratory:  Negative for shortness of breath.    Cardiovascular:  Negative for chest pain and palpitations.   Gastrointestinal:  Negative for abdominal pain, nausea and vomiting.   Genitourinary:  Negative for dysuria.   Musculoskeletal:  Negative for back pain.   Skin:  Positive for rash.   Neurological:  Negative for weakness and headaches.   Hematological:  Does not bruise/bleed easily.   All other systems reviewed and are negative.    Physical Exam     Initial Vitals [01/27/23 1655]   BP Pulse Resp Temp SpO2   (!) 137/99 96 19 97.6 °F (36.4 °C) 98 %      MAP       --         Physical Exam    Nursing note and vitals reviewed.  Constitutional: She appears well-developed and well-nourished. No distress.   HENT:   Head: Normocephalic and atraumatic.   Right Ear: External ear normal.   Left Ear: External ear normal.   Mouth/Throat: No oropharyngeal exudate.   Eyes: Conjunctivae and EOM are normal. Pupils are equal, round, and reactive to light.   Neck: Neck supple. No tracheal deviation present.   Cardiovascular:  Normal rate, regular rhythm, normal heart sounds and intact distal pulses.           No murmur heard.  Pulmonary/Chest: Breath sounds normal. No stridor. No respiratory distress. She has no wheezes. She has no rhonchi. She has no rales.   Abdominal: Abdomen is soft. She exhibits no distension. There is no abdominal tenderness. There is no rebound and no guarding.   Musculoskeletal:         General: No tenderness or edema. Normal range of motion.      Cervical back: Neck supple.     Lymphadenopathy:     She has no cervical adenopathy.   Neurological: She is  alert and oriented to person, place, and time. She has normal strength. No cranial nerve deficit or sensory deficit. GCS score is 15. GCS eye subscore is 4. GCS verbal subscore is 5. GCS motor subscore is 6.   Skin: Skin is warm and dry. Capillary refill takes less than 2 seconds. No rash noted. No erythema. No pallor.   Psychiatric: She has a normal mood and affect. Her behavior is normal. Judgment and thought content normal.       ED Course   Procedures  Labs Reviewed - No data to display       Imaging Results    None          Medications - No data to display  Medical Decision Making:   ED Management:  48-year-old female presents emergency department with rash and concern for parasites under her skin.  She is picking at her skin.  On her exam she has multiple lesions which are consistent with picking.  I do not see any obvious parasitic infection however out abundance of precaution will give her albendazole.  Do not see any superinfected lesions or any MRSA type lesions.  Recommend she follow up with primary care provider on outpatient basis I recommended cessation of alcohol and methamphetamine use.    A dictation software program was used for this note.  Please expect some simple typographical  errors in this note.    I had a detailed discussion with the patient and/or guardian regarding: The historical points, exam findings, and diagnostic results supporting the discharge diagnosis, lab results, pertinent radiology results, and the need for outpatient follow-up, for definitive care with a family practitioner and to return to the emergency department if symptoms worsen or persist or if there are any questions or concerns that arise at home. All questions have been answered in detail. Strict return to Emergency Department precautions have been provided.                          Clinical Impression:   Final diagnoses:  [R21] Rash and nonspecific skin eruption (Primary)  [F42.4] Picking own skin  [L98.8] Dee Dee  syndrome        ED Disposition Condition    Discharge Stable          ED Prescriptions       Medication Sig Dispense Start Date End Date Auth. Provider    albendazole (ALBENZA) 200 mg Tab Take 2 tablets (400 mg total) by mouth 2 (two) times daily with meals. for 5 days 20 tablet 1/27/2023 2/1/2023 Tucker Mayorga DO          Follow-up Information       Follow up With Specialties Details Why Contact Info Additional Information    [facility] In 3 days  [address] [address]  [phone]       [facility] - Emergency Dept Emergency Medicine  If symptoms worsen [address] [address]  [phone] 1st floor             Tucker Mayorga DO  01/27/23 1825

## 2023-01-30 LAB — BACTERIA UR CULT: ABNORMAL

## 2023-03-01 ENCOUNTER — TELEPHONE (OUTPATIENT)
Dept: HEPATOLOGY | Facility: CLINIC | Age: 48
End: 2023-03-01
Payer: MEDICAID

## 2023-03-01 NOTE — TELEPHONE ENCOUNTER
Patient's friend Blanca Miles phoned.  She asked that patient be rescheduled for missed appts.  Patient scheduled for hcc testing on 3/3/23 and visit to see PA Scheuermann on 3/23/23; appt reminder notice mailed.

## 2023-03-03 ENCOUNTER — HOSPITAL ENCOUNTER (OUTPATIENT)
Dept: RADIOLOGY | Facility: HOSPITAL | Age: 48
Discharge: HOME | End: 2023-03-03
Attending: PHYSICIAN ASSISTANT
Payer: MEDICAID

## 2023-03-03 DIAGNOSIS — K74.60 HEPATIC CIRRHOSIS, UNSPECIFIED HEPATIC CIRRHOSIS TYPE, UNSPECIFIED WHETHER ASCITES PRESENT: ICD-10-CM

## 2023-03-03 PROCEDURE — 76705 ECHO EXAM OF ABDOMEN: CPT | Mod: TC

## 2023-03-06 ENCOUNTER — HOSPITAL ENCOUNTER (EMERGENCY)
Facility: HOSPITAL | Age: 48
Discharge: PSYCHIATRIC OR SUBSTANCE USE FACILITY | End: 2023-03-07
Attending: EMERGENCY MEDICINE
Payer: MEDICAID

## 2023-03-06 DIAGNOSIS — R45.851 SUICIDAL IDEATION: Primary | ICD-10-CM

## 2023-03-06 DIAGNOSIS — F19.10 POLYSUBSTANCE ABUSE: ICD-10-CM

## 2023-03-06 LAB
ALBUMIN SERPL BCP-MCNC: 4.4 G/DL (ref 3.5–5.2)
ALP SERPL-CCNC: 84 U/L (ref 55–135)
ALT SERPL W/O P-5'-P-CCNC: 20 U/L (ref 10–44)
ANION GAP SERPL CALC-SCNC: 13 MMOL/L (ref 8–16)
APAP SERPL-MCNC: <10 UG/ML (ref 10–20)
AST SERPL-CCNC: 33 U/L (ref 10–40)
BASOPHILS # BLD AUTO: 0.1 K/UL (ref 0–0.2)
BASOPHILS NFR BLD: 1.3 % (ref 0–1.9)
BILIRUB SERPL-MCNC: 0.4 MG/DL (ref 0.1–1)
BUN SERPL-MCNC: 12 MG/DL (ref 6–20)
CALCIUM SERPL-MCNC: 9.2 MG/DL (ref 8.7–10.5)
CHLORIDE SERPL-SCNC: 100 MMOL/L (ref 95–110)
CO2 SERPL-SCNC: 22 MMOL/L (ref 23–29)
CREAT SERPL-MCNC: 0.6 MG/DL (ref 0.5–1.4)
DIFFERENTIAL METHOD: ABNORMAL
EOSINOPHIL # BLD AUTO: 0.1 K/UL (ref 0–0.5)
EOSINOPHIL NFR BLD: 1.3 % (ref 0–8)
ERYTHROCYTE [DISTWIDTH] IN BLOOD BY AUTOMATED COUNT: 13.6 % (ref 11.5–14.5)
EST. GFR  (NO RACE VARIABLE): >60 ML/MIN/1.73 M^2
ETHANOL SERPL-MCNC: 307 MG/DL
GLUCOSE SERPL-MCNC: 112 MG/DL (ref 70–110)
HCT VFR BLD AUTO: 40.5 % (ref 37–48.5)
HGB BLD-MCNC: 14.1 G/DL (ref 12–16)
IMM GRANULOCYTES # BLD AUTO: 0.03 K/UL (ref 0–0.04)
IMM GRANULOCYTES NFR BLD AUTO: 0.4 % (ref 0–0.5)
LYMPHOCYTES # BLD AUTO: 3.4 K/UL (ref 1–4.8)
LYMPHOCYTES NFR BLD: 43.7 % (ref 18–48)
MCH RBC QN AUTO: 33.5 PG (ref 27–31)
MCHC RBC AUTO-ENTMCNC: 34.8 G/DL (ref 32–36)
MCV RBC AUTO: 96 FL (ref 82–98)
MONOCYTES # BLD AUTO: 0.5 K/UL (ref 0.3–1)
MONOCYTES NFR BLD: 6.3 % (ref 4–15)
NEUTROPHILS # BLD AUTO: 3.6 K/UL (ref 1.8–7.7)
NEUTROPHILS NFR BLD: 47 % (ref 38–73)
NRBC BLD-RTO: 0 /100 WBC
PLATELET # BLD AUTO: 330 K/UL (ref 150–450)
PLATELET BLD QL SMEAR: ABNORMAL
PMV BLD AUTO: 8.7 FL (ref 9.2–12.9)
POTASSIUM SERPL-SCNC: 3.6 MMOL/L (ref 3.5–5.1)
PROT SERPL-MCNC: 8.2 G/DL (ref 6–8.4)
RBC # BLD AUTO: 4.21 M/UL (ref 4–5.4)
SALICYLATES SERPL-MCNC: <4 MG/DL (ref 15–30)
SODIUM SERPL-SCNC: 135 MMOL/L (ref 136–145)
TSH SERPL DL<=0.005 MIU/L-ACNC: 0.65 UIU/ML (ref 0.34–5.6)
WBC # BLD AUTO: 7.75 K/UL (ref 3.9–12.7)

## 2023-03-06 PROCEDURE — 99215 OFFICE O/P EST HI 40 MIN: CPT | Mod: AF,HB,95, | Performed by: PSYCHIATRY & NEUROLOGY

## 2023-03-06 PROCEDURE — 80179 DRUG ASSAY SALICYLATE: CPT | Performed by: EMERGENCY MEDICINE

## 2023-03-06 PROCEDURE — 80053 COMPREHEN METABOLIC PANEL: CPT | Performed by: EMERGENCY MEDICINE

## 2023-03-06 PROCEDURE — 84443 ASSAY THYROID STIM HORMONE: CPT | Performed by: EMERGENCY MEDICINE

## 2023-03-06 PROCEDURE — 99285 EMERGENCY DEPT VISIT HI MDM: CPT

## 2023-03-06 PROCEDURE — 85025 COMPLETE CBC W/AUTO DIFF WBC: CPT | Performed by: EMERGENCY MEDICINE

## 2023-03-06 PROCEDURE — 99215 PR OFFICE/OUTPT VISIT, EST, LEVL V, 40-54 MIN: ICD-10-PCS | Mod: AF,HB,95, | Performed by: PSYCHIATRY & NEUROLOGY

## 2023-03-06 PROCEDURE — 82077 ASSAY SPEC XCP UR&BREATH IA: CPT | Performed by: EMERGENCY MEDICINE

## 2023-03-06 PROCEDURE — 80143 DRUG ASSAY ACETAMINOPHEN: CPT | Performed by: EMERGENCY MEDICINE

## 2023-03-06 RX ORDER — THIAMINE HCL 100 MG
100 TABLET ORAL DAILY
Status: DISCONTINUED | OUTPATIENT
Start: 2023-03-07 | End: 2023-03-07 | Stop reason: HOSPADM

## 2023-03-07 ENCOUNTER — TELEPHONE (OUTPATIENT)
Dept: HEPATOLOGY | Facility: CLINIC | Age: 48
End: 2023-03-07
Payer: MEDICAID

## 2023-03-07 VITALS
OXYGEN SATURATION: 97 % | BODY MASS INDEX: 22.5 KG/M2 | DIASTOLIC BLOOD PRESSURE: 89 MMHG | TEMPERATURE: 99 F | HEIGHT: 66 IN | WEIGHT: 140 LBS | SYSTOLIC BLOOD PRESSURE: 146 MMHG | RESPIRATION RATE: 16 BRPM | HEART RATE: 79 BPM

## 2023-03-07 LAB
AMPHET+METHAMPHET UR QL: ABNORMAL
BARBITURATES UR QL SCN>200 NG/ML: NEGATIVE
BENZODIAZ UR QL SCN>200 NG/ML: NEGATIVE
BILIRUB UR QL STRIP: NEGATIVE
BZE UR QL SCN: NEGATIVE
CANNABINOIDS UR QL SCN: ABNORMAL
CLARITY UR: CLEAR
COLOR UR: YELLOW
CREAT UR-MCNC: 53 MG/DL (ref 15–325)
ETHANOL SERPL-MCNC: <5 MG/DL
GLUCOSE UR QL STRIP: NEGATIVE
HGB UR QL STRIP: NEGATIVE
KETONES UR QL STRIP: NEGATIVE
LEUKOCYTE ESTERASE UR QL STRIP: NEGATIVE
NITRITE UR QL STRIP: NEGATIVE
OPIATES UR QL SCN: NEGATIVE
PCP UR QL SCN>25 NG/ML: NEGATIVE
PH UR STRIP: 7 [PH] (ref 5–8)
PROT UR QL STRIP: NEGATIVE
SP GR UR STRIP: 1.01 (ref 1–1.03)
TOXICOLOGY INFORMATION: ABNORMAL
URN SPEC COLLECT METH UR: NORMAL
UROBILINOGEN UR STRIP-ACNC: NEGATIVE EU/DL

## 2023-03-07 PROCEDURE — 82077 ASSAY SPEC XCP UR&BREATH IA: CPT | Performed by: EMERGENCY MEDICINE

## 2023-03-07 PROCEDURE — 80307 DRUG TEST PRSMV CHEM ANLYZR: CPT | Performed by: EMERGENCY MEDICINE

## 2023-03-07 NOTE — CONSULTS
[facility]  Psychiatry  Telepsychiatry Consult Note          Please see previous notes: see prior psychiatric notes in the chart     Patient agreeable to consultation via telepsychiatry.    Tele-Consultation from Psychiatry started: 3/6/2023 at 6:52 PM  The chief complaint leading to psychiatric consultation is: Suicidal Ideation   This consultation was requested by Quentin Valles MD, the Emergency Department attending physician.  The location of the consulting psychiatrist is [place], [place].    The patient location is  [facility] EMERGENCY DEPARTMENT   The patient arrived at the ED at: on 03/06/2023     Also present with the patient at the time of the consultation: nurse / tech     Patient Identification:   Carmen Landon is a 48 y.o. female.    Patient information was obtained from patient, past medical records, and ED MD.  Patient presented voluntarily to the Emergency Department.    IP consult to Telemedicine - Psych  Consult performed by: Sylvester Mendenhall MD  Consult ordered by: Quentin Valles MD      Consult Start Time: 03/06/2023 18:52 CST  Consult End Time: 03/06/2023 19:53 CST      HISTORY    Chief Complaint / Reason for Psychiatry Consult: Suicidal Ideation     HPI:   Carmen Landon is a 48 y.o. female with a past medical history as noted below, and a past psychiatric history of depression, anxiety, psychosis, stimulant abuse, and alcohol use disorder, currently in the ED as noted above for SI.  Psychiatry was originally consulted as noted above.  The patient was seen and examined.  The chart was reviewed.  On examination today, the patient was somnolent but oriented to person, place, city, state, month, year, and situation.  She was CAM-ICU negative for delirium.  She endorses recent intermittent compliance with unknown dosages of Lexapro, Zyprexa, and Trazodone (prescribed by an unknown provider with [facility]).  She endorses worsening depression, anxiety, and  "psychosis s/s (as noted below in the detailed psych ROS) over the past 1-2 weeks in the context of daily alcohol abuse (1/2 pint of liquor per day) and 3 x per week abuse of crystal meth (denies IVDU).  She most notably endorses recent and current suicidal ideation (denies any plan).  She also endorses current ego-dystonic AH (denies any commands) as well as paranoid delusional TC that her coworkers are conspiring against her.  She denies any VH, TH, or DIGFAST (jenn) s/s.  She denies any current/recent passive/active HI.  She denies any adverse effects to her current medication regimen.  Regarding current medical/physical complaints, she endorses fatigue and nausea.  She denies any other medical complaints at this time.  NAD was observed during the examination.  Patient did not appear tremulous on exam today.  See detailed psych ROS below.  See A/P below.        Psychiatric Review Of Systems - Currently, the patient is endorsing and/or denying the following:  (patient's endorsements are BOLDED below; if not BOLDED, then patient denied):    Endorses Symptoms of Depression: diminished mood, low motivation, loss of interest/anhedonia, irritability, diminished energy, change in sleep, change in appetite, diminished concentration or cognition or indecisiveness, PMA/R, excessive feelings of guilt, hopelessness, worthlessness, and passive suicidal ideations (denies plan)    Denies issues with Sleep: initiation, maintenance, early morning awakening with inability to return to sleep    Denies Homicidal ideations: active/passive ideations, organized plans, future intentions    Endorses Symptoms of psychosis: ego-dystonic auditory hallucinations, paranoia ("everyone is conspiring to see me fail"), visual hallucinations, delusions, disorganized thinking, disorganized behavior or abnormal motor behavior, or negative symptoms (diminshed emotional expression, avolition, anhedonia, alogia, asociality)     Denies Symptoms of jenn " or hypomania: elevated, expansive, or irritable mood with increased energy or activity; with inflated self-esteem or grandiosity, decreased need for sleep, increased rate of speech, FOI or racing thoughts, distractibility, increased goal directed activity or PMA, risky/disinhibited behavior    Endorses intermittent Symptoms of Anxiety: excessive anxiety/worry/fear, more days than not, about numerous issues, difficult to control, with restlessness, fatigue, poor concentration, irritability, muscle tension, sleep disturbance; causes functionally impairing distress     Denies Symptoms of Panic Disorder: recurrent panic attacks, precipitated or un-precipitated, source of worry and/or behavioral changes secondary; with or without agoraphobia    Denies Symptoms of PTSD: h/o trauma; re-experiencing/intrusive symptoms, avoidant behavior, negative alterations in cognition or mood, or hyperarousal symptoms; with or without dissociative symptoms     Denies Symptoms of OCD: obsessions or compulsions     Denies Symptoms of Eating Disorders: anorexia, bulimia or binging    Endorses Substance Use (alcohol and crystal meth): intoxication, withdrawal, tolerance, used in larger amounts or duration than intended, unsuccessful attempts to limit or quit, increased time engaging in or seeking out, cravings or strong desire to use, failure to fulfill obligations, negative consequences in social/interpersonal/occupational,/recreational areas, use in dangerous situations, medical or psychological consequences       ROS:  General ROS: negative for - chills, fever or night sweats; positive for fatigue  Ophthalmic ROS: negative for - blurry vision, double vision or eye pain  ENT ROS: negative for - sinus pain, headaches, sore throat or visual changes  Allergy and Immunology ROS: negative for - hives, itchy/watery eyes or nasal congestion  Hematological and Lymphatic ROS: negative for - bleeding problems, bruising, jaundice or pallor  Endocrine  "ROS: negative for - galactorrhea, hot flashes, mood swings, palpitations or temperature intolerance  Respiratory ROS: negative for - cough, hemoptysis, shortness of breath, tachypnea or wheezing  Cardiovascular ROS: negative for - chest pain, dyspnea on exertion, loss of consciousness, palpitations, rapid heart rate or shortness of breath  Gastrointestinal ROS: negative for - appetite loss, abdominal pain, blood in stools, change in bowel habits, constipation or diarrhea; positive for nausea  Genito-Urinary ROS: negative for - incontinence, nocturia or pelvic pain  Musculoskeletal ROS: negative for - joint stiffness, joint swelling, joint pain or muscle pain   Neurological ROS: negative for - behavioral changes, confusion, dizziness, memory loss, numbness/tingling or seizures  Dermatological ROS: negative for dry skin, hair changes, pruritus or rash  Psychiatric ROS: see detailed psychiatric ROS above in history section       Past Psychiatric History:  Previous Medication Trials: celexa, zoloft, trazodone, lexapro, zyprexa, and other unknown medications   Previous Psychiatric Hospitalizations: yes  Previous Suicide Attempts: yes, in 2010   History of Violence: denies   Outpatient Psychiatrist: most recently at [facility]   Hx of Depression: yes  Hx of Anxiety: yes    Social History:  Marital Status: single  Children: two adult children   Employment Status/Info: currently employed in retail   Education: some college  Special Ed:  denies  :  denies  Cheondoism: Protestant   Housing Status: lives alone in [place], [place]   Hobbies/Leisure time: "not much recently"  History of phys/sexual abuse: denies  Access to gun: denies     Family Psychiatric History: mother with substance abuse     Substance Abuse History:  Recreational Drugs: crystal meth abuse about 3 x per week for past year ; denies other ; denies IVDU ; (counseled on cessation)   Use of Alcohol: 1/2 pint of liquor daily for past year " (multi-year hx of alcohol abuse / dependence) ; denies hx of complicated withdrawal ; (counseled on cessation)   Rehab History: yes, for alcohol   Tobacco Use: 1/2 PPD (counseled on cessation)   Social History     Tobacco Use   Smoking Status Every Day    Packs/day: 0.50    Types: Cigarettes   Smokeless Tobacco Never   Use of Caffeine: denies  Use of OTC: denies  Legal consequences of chemical use: denies    Legal History:  Past Charges/Incarcerations: denies   Pending charges: denies      Psychosocial Stressors: family, health, occupational, and drug and alcohol  Functioning Relationships: feels alone & isolated  Strengths AND Liabilities: Strength: Patient accepts guidance/feedback, Strength: Patient is expressive/articulate., Liability: Patient has poor judgment, Liability: Patient is unstable., Liability: Patient lacks coping skills.      PAST MEDICAL & SURGICAL HISTORY   Past Medical History:   Diagnosis Date    Addiction to drug     Alcohol abuse     Allergy     Anemia     Cirrhosis     Hepatitis C virus infection cured after antiviral drug therapy     s/p 24 wks epclusa - treated / cured. (SVR12 - 11/2021)    History of psychiatric hospitalization     alcohol related    Hx of psychiatric care     Meningitis     Psychiatric problem      Past Surgical History:   Procedure Laterality Date    ESOPHAGOGASTRODUODENOSCOPY N/A 5/10/2021    Procedure: EGD (ESOPHAGOGASTRODUODENOSCOPY);  Surgeon: Juan Manuel Sahni MD;  Location: [facility];  Service: Endoscopy;  Laterality: N/A;    TUBAL LIGATION         NEUROLOGIC HISTORY  Seizures: denies   Head trauma with LOC: denies  CVA: denies      FAMILY HISTORY   Family History   Problem Relation Age of Onset    Alcohol abuse Mother     Kidney disease Mother     Heart disease Father        ALLERGIES   Review of patient's allergies indicates:  No Known Allergies    CURRENT MEDICATION REGIMEN   Home Meds:   Prior to Admission medications    Medication Sig Start Date End Date  Taking? Authorizing Provider   aspirin (ECOTRIN) 81 MG EC tablet Take 81 mg by mouth once daily.    Historical Provider   cefUROXime (CEFTIN) 500 MG tablet Take 1 tablet (500 mg total) by mouth every 12 (twelve) hours. 1/27/23   Tucker Mayorga,    CHANTIX 0.5 mg Tab Take by mouth. 6/16/21   Historical Provider   CHANTIX 1 mg Tab Take 1 mg by mouth 2 (two) times daily. 6/17/21   Historical Provider   cloNIDine (CATAPRES) 0.1 MG tablet Take 0.1 mg by mouth every 6 (six) hours as needed. 5/25/21   Historical Provider   dicyclomine (BENTYL) 20 mg tablet Take 20 mg by mouth every 4 (four) hours as needed. 5/25/21   Historical Provider   ENLYTE 1.5 mg iron- 8.73 mg CpID Take 1 capsule by mouth once daily. 6/16/21   Historical Provider   erythromycin (ROMYCIN) ophthalmic ointment Place into the right eye 3 (three) times daily. 3/18/21   Esther Reeves, DAT   EScitalopram oxalate (LEXAPRO) 10 MG tablet Take 10 mg by mouth once daily. 5/20/22   Historical Provider   FLUoxetine 10 MG capsule Take 10 mg by mouth every morning. 6/28/21   Historical Provider   FLUoxetine 20 MG capsule Take 20 mg by mouth once daily.    Historical Provider   gabapentin (NEURONTIN) 100 MG capsule Take by mouth. 6/28/21   Historical Provider   gabapentin (NEURONTIN) 300 MG capsule Take 300 mg by mouth nightly. 6/16/21   Historical Provider   hydrOXYzine pamoate (VISTARIL) 25 MG Cap Take 25 mg by mouth 2 (two) times daily as needed. 5/15/22   Historical Provider   lactulose (CHRONULAC) 20 gram/30 mL Soln Take 30mL 2-3 times daily with a goal of 3-4 BM's daily 8/8/22   Jennifer B. Scheuermann, PA   loratadine (CLARITIN) 10 mg tablet Take 10 mg by mouth once daily. 6/3/21   Historical Provider   methocarbamoL (ROBAXIN) 750 MG Tab Take 750 mg by mouth every 8 (eight) hours as needed. 5/25/21   Historical Provider   metroNIDAZOLE (FLAGYL) 500 MG tablet Take 2,000 mg by mouth once. 6/3/21   Historical Provider   milk thistle 175 mg tablet Take 175 mg  by mouth once daily.    Historical Provider   multivitamin Tab Take 1 tablet by mouth once daily. 20   Kian Brown MD   naltrexone (DEPADE) 50 mg tablet 3 DAY TAPER 21   Historical Provider   OLANZapine (ZYPREXA) 10 MG tablet Take 10 mg by mouth every evening.    Historical Provider   OLANZapine (ZYPREXA) 5 MG tablet Take 5 mg by mouth once daily. 21   Historical Provider   ondansetron (ZOFRAN) 4 MG tablet Take 1 tablet (4 mg total) by mouth every 12 (twelve) hours as needed for Nausea. 22   Jennifer B. Scheuermann, PA   ondansetron (ZOFRAN-ODT) 4 MG TbDL Take 1 tablet (4 mg total) by mouth every 8 (eight) hours as needed. 22   Ag Pyle MD   pantoprazole (PROTONIX) 40 MG tablet Take 1 tablet (40 mg total) by mouth 2 (two) times daily. 22  Jennifer B. Scheuermann, PA   thiamine 100 MG tablet Take 1 tablet (100 mg total) by mouth once daily. 20   Kian Brown MD   traZODone (DESYREL) 100 MG tablet Take 100 mg by mouth once daily. 21   Historical Provider   traZODone (DESYREL) 150 MG tablet Take 150 mg by mouth nightly. 21   Historical Provider   VIVITROL 380 mg SSRR kit SMARTSI Milligram(s) IM Every 4 Weeks 21   Historical Provider       OTC Meds: denies     Scheduled Meds:    PRN Meds:    Psychotherapeutics (From admission, onward)      None            LABORATORY DATA   Recent Results (from the past 72 hour(s))   CBC auto differential    Collection Time: 23  5:48 PM   Result Value Ref Range    WBC 7.75 3.90 - 12.70 K/uL    RBC 4.21 4.00 - 5.40 M/uL    Hemoglobin 14.1 12.0 - 16.0 g/dL    Hematocrit 40.5 37.0 - 48.5 %    MCV 96 82 - 98 fL    MCH 33.5 (H) 27.0 - 31.0 pg    MCHC 34.8 32.0 - 36.0 g/dL    RDW 13.6 11.5 - 14.5 %    Platelets 330 150 - 450 K/uL    MPV 8.7 (L) 9.2 - 12.9 fL    Immature Granulocytes 0.4 0.0 - 0.5 %    Gran # (ANC) 3.6 1.8 - 7.7 K/uL    Immature Grans (Abs) 0.03 0.00 - 0.04 K/uL    Lymph # 3.4 1.0 - 4.8  "K/uL    Mono # 0.5 0.3 - 1.0 K/uL    Eos # 0.1 0.0 - 0.5 K/uL    Baso # 0.10 0.00 - 0.20 K/uL    nRBC 0 0 /100 WBC    Gran % 47.0 38.0 - 73.0 %    Lymph % 43.7 18.0 - 48.0 %    Mono % 6.3 4.0 - 15.0 %    Eosinophil % 1.3 0.0 - 8.0 %    Basophil % 1.3 0.0 - 1.9 %    Platelet Estimate Appears normal     Differential Method Automated    TSH    Collection Time: 03/06/23  5:48 PM   Result Value Ref Range    TSH 0.650 0.340 - 5.600 uIU/mL   Urinalysis, Reflex to Urine Culture Urine, Clean Catch    Collection Time: 03/06/23  5:50 PM    Specimen: Urine   Result Value Ref Range    Specimen UA SEE COMMENT     Color, UA SEE COMMENT Yellow, Straw, Natalie    Appearance, UA SEE COMMENT Clear    pH, UA SEE COMMENT 5.0 - 8.0    Specific Gravity, UA SEE COMMENT 1.005 - 1.030    Protein, UA SEE COMMENT Negative    Glucose, UA SEE COMMENT Negative    Ketones, UA SEE COMMENT Negative    Bilirubin (UA) SEE COMMENT Negative    Occult Blood UA SEE COMMENT Negative    Nitrite, UA SEE COMMENT Negative    Urobilinogen, UA SEE COMMENT Negative EU/dL    Leukocytes, UA SEE COMMENT Negative      No results found for: PHENYTOIN, PHENOBARB, VALPROATE, CBMZ      EXAMINATION    VITALS   Vitals:    03/06/23 1713   BP: 126/86   Pulse: 75   Resp: 18   Temp: 98.4 °F (36.9 °C)   TempSrc: Oral   SpO2: 100%   Weight: 63.5 kg (140 lb)   Height: 5' 6" (1.676 m)        CONSTITUTIONAL  General Appearance: NAD, unremarkable, age appropriate, disheveled, lying in bed, calm, guarded     MUSCULOSKELETAL  Muscle Strength and Tone: WNL    Abnormal Involuntary Movements: none observed   Gait and Station: Attempted but unable to assess due to medical acuity     PSYCHIATRIC   Behavior/Cooperation:  cooperative, eye contact minimal, calm, guarded   Speech:  emotional tone, normal pitch, normal volume, slowed  Language: grossly intact, able to name, able to repeat with spontaneous speech  Mood:  "depressed"  Affect:  congruent with mood and blunted / tearful "   Associations: intact; no DEANGELO  Thought Process: Linear   Thought Content: + SI ; + AH ; + paranoia ; denies HI, VH, TH (no RIS observed)   Sensorium:  Awake  Alert and Oriented: to person, place, city, state, month, year, and situation   Memory: 3/3 immediate, 1/3 at 5 minutes    Recent: Intact; able to report recent events   Remote: Limited / Intact; Named 3/4 past presidents   Attention/concentration: Limited / Intact. Able to spell w-[id number]-[id number] but NOT d-l-r-o-w.   Similarities: Intact (difference between apple and orange?)  Abstract reasoning: Limited / Intact  Fund of Knowledge: Named 3/4 past presidents   Insight: Impaired / Limited   Judgment: Impaired / Limited     CAM ICU Delirium Assessment - NEGATIVE    Is the patient aware of the biomedical complications associated with substance abuse and mental illness? yes        MEDICAL DECISION MAKING    ASSESSMENT        Unspecified Depressive Disorder with Suicidal Ideation   Unspecified Schizophrenia Spectrum and Other Psychotic Disorder (Unspecified Psychosis)   Unspecified Anxiety Disorder   Methamphetamine Abuse   Alcohol Abuse   (Rule out SIMD / SIPD)       RECOMMENDATIONS       - With reasonable medical certainty, based on a present-state examination, the patient currently meets PEC criteria due to being gravely disabled and being an imminent threat to self 2/2 mental illness at this time.      - Once medically cleared, seek inpatient psychiatric admission for further mental health treatment / stabilization (preferably a dual diagnosis facility given patient's polysubstance abuse).      - Begin Folate / Thiamine / Multi-Vitamin supplementation given patient's alcohol abuse hx. (discussed risks/benefits/alt vs no treatment with patient).     - Defer other scheduled psychiatric medication(s) to the inpatient psychiatric treatment team (discussed risks/benefits/alt vs no treatment with patient).    - Defer non-psychiatric medications / management to the ED MD.       - Psychotherapy was performed with patient with a focus on improving depression with SI, psychosis, and polysubstance cessation / total sobriety.      - Patient's most recent resulted labs, imaging, and EKG were reviewed today ; TSH wnl ; UDS and Ethanol pending      - Continue suicide / violence / withdrawal (with CIWA-Ar protocol and PRN Ativan) precautions and continue to monitor the patient with a sitter while on a PEC / CEC.       - Thank you for this consult          Total time spent with patient face-to-face and/or managing/coordinating patient's care today: 61 minutes      More than 50% of the time was spent counseling/coordinating care.     Consulting clinician was informed of the encounter and consult note.     Consultation ended: 3/6/2023 at 7:53 PM       STAFF:  Sylvester Mendenhall MD  [facility]   3/6/2023

## 2023-03-07 NOTE — TELEPHONE ENCOUNTER
----- Message from Alfredo Lima sent at 3/7/2023 11:13 AM CST -----  Regarding: Patient Health Update  Contact: Blanca  Patient's friend called in requesting to speak with nurse/provider regarding patient's nurse. She states patient had a mental episode , attempting to kill herself. Wanted to notify care team.     Requesting a call back to discuss further.                Contact: [phone]

## 2023-03-07 NOTE — TELEPHONE ENCOUNTER
Attempt made to reach Radha  KARL informing her that message regarding patient's suicide attempt received and will be passed on to PA Scheuermann.

## 2023-03-07 NOTE — ED NOTES
Rounding on the patient has been done. she has been updated on the plan of care and her current status. Comfort positioning and restroom needs were addressed. she was advised when a reassessment would take place. The patient is resting comfortably on the stretcher, respirations are even and unlabored, skin warm and dry. sitter outside room maintaining visual contact with patient.

## 2023-03-07 NOTE — ED PROVIDER NOTES
Encounter Date: 3/6/2023       History     Chief Complaint   Patient presents with    Mental Health Problem     Pt arrived by ems from home, pt family called after pt was stabbing herself in her leg with a pencil, pt states she is suicidal, surface wounds noted to right thigh bleeding controled.     Patient with a history of substance and alcohol abuse.  Patient with suicidal ideation.  Patient reportedly stabbing her leg with a pencil.  Patient arrives with EMS.  Patient admits to methamphetamines 4 days ago.  She admits to drinking whiskey this evening.  She states since arriving here she is no longer suicidal.    Review of patient's allergies indicates:  No Known Allergies  Past Medical History:   Diagnosis Date    Addiction to drug     Alcohol abuse     Allergy     Anemia     Cirrhosis     Hepatitis C virus infection cured after antiviral drug therapy     s/p 24 wks epclusa - treated / cured. (SVR12 - 11/2021)    History of psychiatric hospitalization     alcohol related    Hx of psychiatric care     Meningitis     Psychiatric problem      Past Surgical History:   Procedure Laterality Date    ESOPHAGOGASTRODUODENOSCOPY N/A 5/10/2021    Procedure: EGD (ESOPHAGOGASTRODUODENOSCOPY);  Surgeon: Juan Manuel Sahni MD;  Location: [facility];  Service: Endoscopy;  Laterality: N/A;    TUBAL LIGATION       Family History   Problem Relation Age of Onset    Alcohol abuse Mother     Kidney disease Mother     Heart disease Father      Social History     Tobacco Use    Smoking status: Every Day     Packs/day: 0.50     Types: Cigarettes    Smokeless tobacco: Never   Substance Use Topics    Alcohol use: Not Currently     Comment: 1 pint of Whiskey per day.     Drug use: Yes     Types: Marijuana     Comment: history of heroin and cocaine     Review of Systems   Constitutional:  Negative for chills and fever.   HENT:  Negative for congestion.    Eyes:  Negative for visual disturbance.   Respiratory:  Negative for shortness of  breath.    Cardiovascular:  Negative for chest pain and palpitations.   Gastrointestinal:  Negative for abdominal pain and vomiting.   Genitourinary:  Negative for dysuria.   Musculoskeletal:  Negative for joint swelling.   Neurological:  Negative for headaches.   Psychiatric/Behavioral:  Negative for agitation, self-injury and suicidal ideas.      Physical Exam     Initial Vitals [03/06/23 1713]   BP Pulse Resp Temp SpO2   126/86 75 18 98.4 °F (36.9 °C) 100 %      MAP       --         Physical Exam    Nursing note and vitals reviewed.  Constitutional: She is not diaphoretic. No distress.   HENT:   Head: Normocephalic and atraumatic.   Eyes: Conjunctivae are normal.   Neck:   Normal range of motion.  Cardiovascular:  Normal rate.           Pulmonary/Chest: Breath sounds normal.   Abdominal: Abdomen is soft. There is no abdominal tenderness.   Musculoskeletal:         General: Normal range of motion.      Cervical back: Normal range of motion.     Neurological: She is alert.   No gross deficits   Skin: No rash noted.   Superficial puncture wound to thighs.  No deep lacerations or abrasions   Psychiatric:   Patient sitting up in bed eating a sandwich.  She is smiling inappropriately.       ED Course   Procedures  Labs Reviewed   CBC W/ AUTO DIFFERENTIAL - Abnormal; Notable for the following components:       Result Value    MCH 33.5 (*)     MPV 8.7 (*)     All other components within normal limits   COMPREHENSIVE METABOLIC PANEL - Abnormal; Notable for the following components:    Sodium 135 (*)     CO2 22 (*)     Glucose 112 (*)     All other components within normal limits   ALCOHOL,MEDICAL (ETHANOL) - Abnormal; Notable for the following components:    Alcohol, Serum 307 (*)     All other components within normal limits    Narrative:     Alcohol critical result(s) repeated. Called and verbal readback   obtained from [facility].  by CW1 03/06/2023 19:53   SALICYLATE LEVEL - Abnormal; Notable for the  following components:    Salicylate Lvl <4.0 (*)     All other components within normal limits   TSH   URINALYSIS, REFLEX TO URINE CULTURE    Narrative:     Specimen Source->Urine   ACETAMINOPHEN LEVEL   DRUG SCREEN PANEL, URINE EMERGENCY   ALCOHOL,MEDICAL (ETHANOL)          Imaging Results    None          Medications   thiamine tablet 100 mg (has no administration in time range)     Medical Decision Making:   History:   Old Medical Records: I decided to obtain old medical records.  Old Records Summarized: records from clinic visits.       <> Summary of Records: History of alcohol and substance abuse  Initial Assessment:   Patient presents with self-injury and suicidal ideation.  Differential Diagnosis:   Psychosis, drug induced psychosis, primary psychiatric disorder.  Clinical Tests:   Lab Tests: Reviewed  The following lab test(s) were unremarkable: CBC and CMP  ED Management:  Patient presents with self-injury and suicidal ideation.  Patient is intoxicated with alcohol.  Patient is gravely disabled.  Tele psychiatry consulted and agrees with physician emergency certificate.  PC completed.  Patient is medically clear for psychiatric evaluation and transfer.           ED Course as of 03/06/23 2024   Mon Mar 06, 2023   1904 Specimen UA: SEE COMMENT [EL]      ED Course User Index  [EL] Quentin Valles MD       Medically cleared for psychiatry placement: 3/6/2023  8:02 PM         Clinical Impression:   Final diagnoses:  [R45.851] Suicidal ideation (Primary)  [F19.10] Polysubstance abuse        ED Disposition Condition    Transfer to Psych Facility Stable          ED Prescriptions    None       Follow-up Information    None          Quentin Valles MD  03/06/23 2024

## 2023-03-23 ENCOUNTER — PATIENT MESSAGE (OUTPATIENT)
Dept: HEPATOLOGY | Facility: CLINIC | Age: 48
End: 2023-03-23
Payer: MEDICAID

## 2023-03-23 ENCOUNTER — OFFICE VISIT (OUTPATIENT)
Dept: HEPATOLOGY | Facility: CLINIC | Age: 48
End: 2023-03-23
Payer: MEDICAID

## 2023-03-23 ENCOUNTER — TELEPHONE (OUTPATIENT)
Dept: HEPATOLOGY | Facility: CLINIC | Age: 48
End: 2023-03-23
Payer: MEDICAID

## 2023-03-23 DIAGNOSIS — K74.60 HEPATIC CIRRHOSIS, UNSPECIFIED HEPATIC CIRRHOSIS TYPE, UNSPECIFIED WHETHER ASCITES PRESENT: Primary | ICD-10-CM

## 2023-03-23 DIAGNOSIS — Z86.19 HEPATITIS C VIRUS INFECTION CURED AFTER ANTIVIRAL DRUG THERAPY: ICD-10-CM

## 2023-03-23 DIAGNOSIS — K20.90 ESOPHAGITIS: ICD-10-CM

## 2023-03-23 DIAGNOSIS — R11.0 NAUSEA: ICD-10-CM

## 2023-03-23 PROCEDURE — 1159F MED LIST DOCD IN RCRD: CPT | Mod: CPTII,95,, | Performed by: PHYSICIAN ASSISTANT

## 2023-03-23 PROCEDURE — 1159F PR MEDICATION LIST DOCUMENTED IN MEDICAL RECORD: ICD-10-PCS | Mod: CPTII,95,, | Performed by: PHYSICIAN ASSISTANT

## 2023-03-23 PROCEDURE — 99215 OFFICE O/P EST HI 40 MIN: CPT | Mod: 95,,, | Performed by: PHYSICIAN ASSISTANT

## 2023-03-23 PROCEDURE — 99215 PR OFFICE/OUTPT VISIT, EST, LEVL V, 40-54 MIN: ICD-10-PCS | Mod: 95,,, | Performed by: PHYSICIAN ASSISTANT

## 2023-03-23 PROCEDURE — 1160F RVW MEDS BY RX/DR IN RCRD: CPT | Mod: CPTII,95,, | Performed by: PHYSICIAN ASSISTANT

## 2023-03-23 PROCEDURE — 1160F PR REVIEW ALL MEDS BY PRESCRIBER/CLIN PHARMACIST DOCUMENTED: ICD-10-PCS | Mod: CPTII,95,, | Performed by: PHYSICIAN ASSISTANT

## 2023-03-23 NOTE — TELEPHONE ENCOUNTER
Attempt made to reach patient.  Entire msg from PA Scheuermann left on her VM and msg regarding vaccine also sent to her via Heather. I stressed that she provide a pharmacy location for vaccine series.   F/u with testing scheduled 9/19/23; appt reminder notice mailed.

## 2023-03-23 NOTE — Clinical Note
1. SCHEDULE CBC, CMP, INR, AFP, U/S, VISIT - 9/2023  2. I forgot to mention HAV vaccine to pt. Please tell her I recommend it. I can send script to any local pharmacy that does injections.

## 2023-03-23 NOTE — TELEPHONE ENCOUNTER
----- Message from Jennifer B. Scheuermann, PA sent at 3/23/2023  9:53 AM CDT -----  1. SCHEDULE CBC, CMP, INR, AFP, U/S, VISIT - 9/2023  2. I forgot to mention HAV vaccine to pt. Please tell her I recommend it. I can send script to any local pharmacy that does injections.

## 2023-03-24 ENCOUNTER — TELEPHONE (OUTPATIENT)
Dept: GASTROENTEROLOGY | Facility: CLINIC | Age: 48
End: 2023-03-24
Payer: MEDICAID

## 2023-03-24 NOTE — TELEPHONE ENCOUNTER
EGD 4/25 at 1pm arrive for 12pm  instructions reviewed and patient states understanding. Copy to portal.

## 2023-06-05 ENCOUNTER — TELEPHONE (OUTPATIENT)
Dept: HEPATOLOGY | Facility: CLINIC | Age: 48
End: 2023-06-05
Payer: COMMERCIAL

## 2023-06-05 ENCOUNTER — TELEPHONE (OUTPATIENT)
Dept: GASTROENTEROLOGY | Facility: CLINIC | Age: 48
End: 2023-06-05
Payer: COMMERCIAL

## 2023-06-05 DIAGNOSIS — K74.60 HEPATIC CIRRHOSIS, UNSPECIFIED HEPATIC CIRRHOSIS TYPE, UNSPECIFIED WHETHER ASCITES PRESENT: ICD-10-CM

## 2023-06-05 DIAGNOSIS — K76.82 HEPATIC ENCEPHALOPATHY: ICD-10-CM

## 2023-06-05 RX ORDER — LACTULOSE 10 G/15ML
SOLUTION ORAL
Qty: 2700 ML | Refills: 5 | Status: SHIPPED | OUTPATIENT
Start: 2023-06-05

## 2023-06-05 NOTE — TELEPHONE ENCOUNTER
----- Message from Hoda Ford sent at 6/5/2023 10:11 AM CDT -----  Regarding: oni  Contact: Patient  Type: Needs Medical Advice  Who Called:  Patient  Symptoms (please be specific):    How long has patient had these symptoms:    Pharmacy name and phone #:    Best Call Back Number: [phone] (home)     Additional Information: Patient stated that she is in need to reschedule an appointment with doctor for a endo. Please contact patient to advise. Thanks!

## 2023-06-05 NOTE — TELEPHONE ENCOUNTER
----- Message from Linda Angulo RN sent at 6/5/2023  9:49 AM CDT -----  Patient called.  Patient needs a Rx sent to [facility] listed pharmacy for lactulose.  Also she wants the Hep A vaccine Rx sent to the [facility] so that she can start series.  Thanks

## 2023-06-07 ENCOUNTER — TELEPHONE (OUTPATIENT)
Dept: GASTROENTEROLOGY | Facility: CLINIC | Age: 48
End: 2023-06-07
Payer: COMMERCIAL

## 2023-06-07 NOTE — TELEPHONE ENCOUNTER
----- Message from Gabriela Styles sent at 6/7/2023 12:51 PM CDT -----  Contact: self  Type: Needs Medical Advice  Who Called:  Patient    Best Call Back Number: [phone]    Additional Information: Pt states she would like to speak with office to get jacy for procedure.Please call back

## 2023-06-07 NOTE — TELEPHONE ENCOUNTER
Returned call to the patient, patient states that she has completed her Hep C treatment 1 year ago and it is time to have her repeat EGD, procedure scheduled for the patient, patient verbalized understanding of this.

## 2023-07-03 ENCOUNTER — ANESTHESIA EVENT (OUTPATIENT)
Dept: ENDOSCOPY | Facility: HOSPITAL | Age: 48
End: 2023-07-03
Payer: COMMERCIAL

## 2023-07-03 ENCOUNTER — ANESTHESIA (OUTPATIENT)
Dept: ENDOSCOPY | Facility: HOSPITAL | Age: 48
End: 2023-07-03
Payer: COMMERCIAL

## 2023-07-03 ENCOUNTER — HOSPITAL ENCOUNTER (OUTPATIENT)
Facility: HOSPITAL | Age: 48
Discharge: HOME | End: 2023-07-03
Attending: INTERNAL MEDICINE | Admitting: INTERNAL MEDICINE
Payer: COMMERCIAL

## 2023-07-03 VITALS
HEART RATE: 79 BPM | RESPIRATION RATE: 16 BRPM | TEMPERATURE: 98 F | SYSTOLIC BLOOD PRESSURE: 152 MMHG | WEIGHT: 139 LBS | OXYGEN SATURATION: 99 % | DIASTOLIC BLOOD PRESSURE: 80 MMHG | HEIGHT: 62 IN | BODY MASS INDEX: 25.58 KG/M2

## 2023-07-03 DIAGNOSIS — R10.9 ABDOMINAL PAIN: ICD-10-CM

## 2023-07-03 PROCEDURE — D9220A PRA ANESTHESIA: ICD-10-PCS | Mod: ANES,,, | Performed by: ANESTHESIOLOGY

## 2023-07-03 PROCEDURE — D9220A PRA ANESTHESIA: ICD-10-PCS | Mod: CRNA,,, | Performed by: NURSE ANESTHETIST, CERTIFIED REGISTERED

## 2023-07-03 PROCEDURE — 25000003 PHARM REV CODE 250: Performed by: NURSE ANESTHETIST, CERTIFIED REGISTERED

## 2023-07-03 PROCEDURE — 00731 ANES UPR GI NDSC PX NOS: CPT | Performed by: INTERNAL MEDICINE

## 2023-07-03 PROCEDURE — 37000008 HC ANESTHESIA 1ST 15 MINUTES: Performed by: INTERNAL MEDICINE

## 2023-07-03 PROCEDURE — 37000009 HC ANESTHESIA EA ADD 15 MINS: Performed by: INTERNAL MEDICINE

## 2023-07-03 PROCEDURE — 27201012 HC FORCEPS, HOT/COLD, DISP: Performed by: INTERNAL MEDICINE

## 2023-07-03 PROCEDURE — 63600175 PHARM REV CODE 636 W HCPCS: Performed by: NURSE ANESTHETIST, CERTIFIED REGISTERED

## 2023-07-03 PROCEDURE — 43239 PR EGD, FLEX, W/BIOPSY, SGL/MULTI: ICD-10-PCS | Mod: ,,, | Performed by: INTERNAL MEDICINE

## 2023-07-03 PROCEDURE — 25000003 PHARM REV CODE 250: Performed by: INTERNAL MEDICINE

## 2023-07-03 PROCEDURE — 43239 EGD BIOPSY SINGLE/MULTIPLE: CPT | Mod: ,,, | Performed by: INTERNAL MEDICINE

## 2023-07-03 PROCEDURE — D9220A PRA ANESTHESIA: Mod: ANES,,, | Performed by: ANESTHESIOLOGY

## 2023-07-03 PROCEDURE — 43239 EGD BIOPSY SINGLE/MULTIPLE: CPT | Performed by: INTERNAL MEDICINE

## 2023-07-03 PROCEDURE — D9220A PRA ANESTHESIA: Mod: CRNA,,, | Performed by: NURSE ANESTHETIST, CERTIFIED REGISTERED

## 2023-07-03 RX ORDER — PROPOFOL 10 MG/ML
VIAL (ML) INTRAVENOUS
Status: DISCONTINUED | OUTPATIENT
Start: 2023-07-03 | End: 2023-07-03

## 2023-07-03 RX ORDER — SODIUM CHLORIDE 9 MG/ML
INJECTION, SOLUTION INTRAVENOUS CONTINUOUS
Status: DISCONTINUED | OUTPATIENT
Start: 2023-07-03 | End: 2023-07-03 | Stop reason: HOSPADM

## 2023-07-03 RX ORDER — ONDANSETRON 4 MG/1
4 TABLET, ORALLY DISINTEGRATING ORAL EVERY 6 HOURS PRN
Qty: 60 TABLET | Refills: 1 | Status: SHIPPED | OUTPATIENT
Start: 2023-07-03 | End: 2023-08-02

## 2023-07-03 RX ORDER — LIDOCAINE HYDROCHLORIDE 20 MG/ML
INJECTION INTRAVENOUS
Status: DISCONTINUED | OUTPATIENT
Start: 2023-07-03 | End: 2023-07-03

## 2023-07-03 RX ADMIN — SODIUM CHLORIDE: 0.9 INJECTION, SOLUTION INTRAVENOUS at 10:07

## 2023-07-03 RX ADMIN — SODIUM CHLORIDE: 9 INJECTION, SOLUTION INTRAVENOUS at 10:07

## 2023-07-03 RX ADMIN — LIDOCAINE HYDROCHLORIDE 100 MG: 20 INJECTION, SOLUTION INTRAVENOUS at 10:07

## 2023-07-03 RX ADMIN — PROPOFOL 100 MG: 10 INJECTION, EMULSION INTRAVENOUS at 10:07

## 2023-07-03 NOTE — ANESTHESIA POSTPROCEDURE EVALUATION
Anesthesia Post Evaluation    Patient: Carmen Landon    Procedure(s) Performed: Procedure(s) (LRB):  EGD (ESOPHAGOGASTRODUODENOSCOPY) (N/A)    Final Anesthesia Type: general      Patient location during evaluation: PACU  Patient participation: Yes- Able to Participate  Level of consciousness: awake and alert  Post-procedure vital signs: reviewed and stable  Pain management: adequate  Airway patency: patent    PONV status at discharge: No PONV  Anesthetic complications: no      Cardiovascular status: blood pressure returned to baseline  Respiratory status: unassisted  Hydration status: euvolemic  Follow-up not needed.          Vitals Value Taken Time   /80 07/03/23 1135   Temp 36.6 °C (97.9 °F) 07/03/23 1135   Pulse 79 07/03/23 1135   Resp 16 07/03/23 1135   SpO2 99 % 07/03/23 1135         Event Time   Out of Recovery 11:50:21         Pain/Kate Score: Kate Score: 10 (7/3/2023 11:35 AM)

## 2023-07-03 NOTE — PROVATION PATIENT INSTRUCTIONS
Discharge Summary/Instructions after an Endoscopic Procedure  Patient Name: Carmen Landon  Patient MRN: [id number]  Patient YOB: 1975  Monday, July 3, 2023  Juan Manuel Sahni MD  Dear patient,  As a result of recent federal legislation (The Federal Cures Act), you may   receive lab or pathology results from your procedure in your [facility]   account before your physician is able to contact you. Your physician or   their representative will relay the results to you with their   recommendations at their soonest availability.  Thank you,  RESTRICTIONS:  During your procedure today, you received medications for sedation.  These   medications may affect your judgment, balance and coordination.  Therefore,   for 24 hours, you have the following restrictions:   - DO NOT drive a car, operate machinery, make legal/financial decisions,   sign important papers or drink alcohol.    ACTIVITY:  Today: no heavy lifting, straining or running due to procedural   sedation/anesthesia.  The following day: return to full activity including work.  DIET:  Eat and drink normally unless instructed otherwise.     TREATMENT FOR COMMON SIDE EFFECTS:  - Mild abdominal pain, nausea, belching, bloating or excessive gas:  rest,   eat lightly and use a heating pad.  - Sore Throat: treat with throat lozenges and/or gargle with warm salt   water.  - Because air was used during the procedure, expelling large amounts of air   from your rectum or belching is normal.  - If a bowel prep was taken, you may not have a bowel movement for 1-3 days.    This is normal.  SYMPTOMS TO WATCH FOR AND REPORT TO YOUR PHYSICIAN:  1. Abdominal pain or bloating, other than gas cramps.  2. Chest pain.  3. Back pain.  4. Signs of infection such as: chills or fever occurring within 24 hours   after the procedure.  5. Rectal bleeding, which would show as bright red, maroon, or black stools.   (A tablespoon of blood from the rectum is not serious, especially if    hemorrhoids are present.)  6. Vomiting.  7. Weakness or dizziness.  GO DIRECTLY TO THE NEAREST EMERGENCY ROOM IF YOU HAVE ANY OF THE FOLLOWING:      Difficulty breathing              Chills and/or fever over 101 F   Persistent vomiting and/or vomiting blood   Severe abdominal pain   Severe chest pain   Black, tarry stools   Bleeding- more than one tablespoon   Any other symptom or condition that you feel may need urgent attention  Your doctor recommends these additional instructions:  If any biopsies were taken, your doctors clinic will contact you in 1 to 2   weeks with any results.  - Discharge patient to home.   - Advance diet as tolerated.   - Continue present medications.   - Await pathology results.  For questions, problems or results please call your physician - Juan Manuel Sahni MD at Work:  [phone].  [facility], EMERGENCY ROOM PHONE NUMBER: [phone]  IF A COMPLICATION OR EMERGENCY SITUATION ARISES AND YOU ARE UNABLE TO REACH   YOUR PHYSICIAN - GO DIRECTLY TO THE EMERGENCY ROOM.  Juan Manuel Sahni MD  7/3/2023 11:08:43 AM  This report has been verified and signed electronically.  Dear patient,  As a result of recent federal legislation (The Federal Cures Act), you may   receive lab or pathology results from your procedure in your [facility]   account before your physician is able to contact you. Your physician or   their representative will relay the results to you with their   recommendations at their soonest availability.  Thank you,  PROVATION

## 2023-07-03 NOTE — ANESTHESIA PREPROCEDURE EVALUATION
07/03/2023  Carmen Landon is a 48 y.o., female.      Pre-op Assessment    I have reviewed the Patient Summary Reports.     I have reviewed the Nursing Notes. I have reviewed the NPO Status.   I have reviewed the Medications.     Review of Systems  Anesthesia Hx:  Denies Family Hx of Anesthesia complications.   Denies Personal Hx of Anesthesia complications.   Hepatic/GI:   Liver Disease, Hepatitis, C    Psych:   Psychiatric History anxiety Alcohol dependence         Physical Exam  General: Well nourished, Cooperative, Alert and Oriented    Airway:  Mallampati: II   Mouth Opening: Normal  TM Distance: Normal  Tongue: Normal  Neck ROM: Normal ROM    Dental:  Edentulous        Anesthesia Plan  Type of Anesthesia, risks & benefits discussed:    Anesthesia Type: Gen Natural Airway  Intra-op Monitoring Plan: Standard ASA Monitors  Induction:  IV  Informed Consent: Informed consent signed with the Patient and all parties understand the risks and agree with anesthesia plan.  All questions answered.   ASA Score: 3    Ready For Surgery From Anesthesia Perspective.     .

## 2023-07-03 NOTE — TRANSFER OF CARE
"Anesthesia Transfer of Care Note    Patient: Carmen Landon    Procedure(s) Performed: Procedure(s) (LRB):  EGD (ESOPHAGOGASTRODUODENOSCOPY) (N/A)    Patient location: GI    Anesthesia Type: general    Transport from OR: Transported from OR on room air with adequate spontaneous ventilation    Post pain: adequate analgesia    Post assessment: no apparent anesthetic complications    Post vital signs: stable    Level of consciousness: awake    Nausea/Vomiting: no nausea/vomiting    Complications: none    Transfer of care protocol was followed      Last vitals:   Visit Vitals  /72 (BP Location: Left arm, Patient Position: Lying)   Pulse 61   Temp 36.9 °C (98.4 °F) (Skin)   Resp 20   Ht 5' 2" (1.575 m)   Wt 63 kg (139 lb)   LMP 10/08/2019 (LMP Unknown)   SpO2 99%   BMI 25.42 kg/m²     "

## 2023-07-03 NOTE — PLAN OF CARE
Patient is AAO, Vss, valerio po fluids, denies pain, ambulates easily. IV removed, catheter intact. Dr. Sahni discussed findings with patient. Discharge instructions provided and states understanding. States ready to go home.  Discharged from facility with family per wheelchair.

## 2023-07-03 NOTE — H&P
History & Physical - Short Stay  Gastroenterology      SUBJECTIVE:     Procedure: EGD    Chief Complaint/Indication for Procedure: Abdominal Pain    PTA Medications   Medication Sig    aripiprazole (ABILIFY ORAL) Take by mouth.    divalproex sodium (DEPAKOTE ORAL) Take by mouth.    EScitalopram oxalate (LEXAPRO) 10 MG tablet Take 10 mg by mouth once daily.    multivitamin Tab Take 1 tablet by mouth once daily.    acamprosate calcium (ACAMPROSATE ORAL) Take by mouth.    FOLIC ACID ORAL Take by mouth.    gabapentin (NEURONTIN) 300 MG capsule Take 300 mg by mouth nightly.    lactulose (CHRONULAC) 20 gram/30 mL Soln Take 30mL 2-3 times daily with a goal of 3-4 BM's daily    levetiracetam (KEPPRA ORAL) Take by mouth.    mirtazapine (REMERON ORAL) Take by mouth.    thiamine 100 MG tablet Take 1 tablet (100 mg total) by mouth once daily.    trazodone HCl (TRAZODONE ORAL) Take by mouth.       Review of patient's allergies indicates:  No Known Allergies     Past Medical History:   Diagnosis Date    Addiction to drug     Alcohol abuse     Allergy     Anemia     Cirrhosis     Hepatitis C virus infection cured after antiviral drug therapy     s/p 24 wks epclusa - treated / cured. (SVR - 2021)    History of psychiatric hospitalization     alcohol related    Hx of psychiatric care     Meningitis     Psychiatric problem      Past Surgical History:   Procedure Laterality Date    ESOPHAGOGASTRODUODENOSCOPY N/A 5/10/2021    Procedure: EGD (ESOPHAGOGASTRODUODENOSCOPY);  Surgeon: Juan Manuel Sahni MD;  Location: [facility];  Service: Endoscopy;  Laterality: N/A;    TUBAL LIGATION       Family History   Problem Relation Age of Onset    Alcohol abuse Mother     Kidney disease Mother     Heart disease Father      Social History     Tobacco Use    Smoking status: Former     Packs/day: 0.50     Types: Cigarettes     Quit date: 2023     Years since quittin.1    Smokeless tobacco: Never   Substance Use Topics    Alcohol use: Not  Currently     Comment: 1 pint of Whiskey per day.     Drug use: Yes     Types: Marijuana     Comment: history of heroin and cocaine     OBJECTIVE:     Physical Exam:                                                       GENERAL:  Comfortable, in no acute distress.                                 HEENT EXAM:  Nonicteric.  No adenopathy.  Oropharynx is clear.               NECK:  Supple.                                                               LUNGS:  Clear.                                                               CARDIAC:  Regular rate and rhythm.  S1, S2.  No murmur.                      ABDOMEN:  Soft, positive bowel sounds, nontender.  No hepatosplenomegaly or masses.  No rebound or guarding.                                             EXTREMITIES:  No edema.     MENTAL STATUS:  Normal, alert and oriented.      ASSESSMENT/PLAN:     Assessment: Abdominal Pain    Plan: EGD

## 2023-07-25 NOTE — ASSESSMENT & PLAN NOTE
Patient with a long chronic history of alcohol use disorder with failed rehab and sobriety.  Current physical exam and labs indicate advanced disease process.  Needs sobriety for best outcome.  Attempted to  and educate but patient not wanting rehab at this time.  Have social work provide patient with local resources for continued sobriety.  Needs inpatient rehab.  Continue to monitor and treat withdrawal with MVI/thiamine/folic acid replacement.  Poor prognosis.  She also complains of something in her ear which may/may not be withdrawal related.  Physical exam should be done for further workup.    12/15/20 - Patient improved.  Needs resources for outpatient rehab from social work.  Not interested in rehab at this time.  Education patient that sobriety is a must in her future.   no

## 2023-08-29 ENCOUNTER — HOSPITAL ENCOUNTER (EMERGENCY)
Facility: HOSPITAL | Age: 48
Discharge: HOME | End: 2023-08-29
Attending: EMERGENCY MEDICINE
Payer: COMMERCIAL

## 2023-08-29 VITALS
HEART RATE: 88 BPM | RESPIRATION RATE: 20 BRPM | WEIGHT: 135 LBS | HEIGHT: 62 IN | SYSTOLIC BLOOD PRESSURE: 134 MMHG | DIASTOLIC BLOOD PRESSURE: 88 MMHG | BODY MASS INDEX: 24.84 KG/M2 | OXYGEN SATURATION: 99 % | TEMPERATURE: 98 F

## 2023-08-29 DIAGNOSIS — B96.89 BACTERIAL VAGINOSIS: Primary | ICD-10-CM

## 2023-08-29 DIAGNOSIS — N30.01 ACUTE CYSTITIS WITH HEMATURIA: ICD-10-CM

## 2023-08-29 DIAGNOSIS — N76.0 BACTERIAL VAGINOSIS: Primary | ICD-10-CM

## 2023-08-29 LAB
BACTERIA #/AREA URNS HPF: ABNORMAL /HPF
BACTERIA GENITAL QL WET PREP: ABNORMAL
BILIRUB UR QL STRIP: NEGATIVE
CLARITY UR: CLEAR
CLUE CELLS VAG QL WET PREP: ABNORMAL
COLOR UR: YELLOW
FILAMENT FUNGI VAG WET PREP-#/AREA: ABNORMAL
GLUCOSE UR QL STRIP: NEGATIVE
HGB UR QL STRIP: ABNORMAL
KETONES UR QL STRIP: NEGATIVE
LEUKOCYTE ESTERASE UR QL STRIP: ABNORMAL
MICROSCOPIC COMMENT: ABNORMAL
NITRITE UR QL STRIP: NEGATIVE
PH UR STRIP: 6 [PH] (ref 5–8)
PROT UR QL STRIP: ABNORMAL
RBC #/AREA URNS HPF: 10 /HPF (ref 0–4)
SP GR UR STRIP: 1.02 (ref 1–1.03)
SPECIMEN SOURCE: ABNORMAL
T VAGINALIS GENITAL QL WET PREP: ABNORMAL
URN SPEC COLLECT METH UR: ABNORMAL
UROBILINOGEN UR STRIP-ACNC: NEGATIVE EU/DL
WBC #/AREA URNS HPF: 25 /HPF (ref 0–5)
WBC #/AREA VAG WET PREP: ABNORMAL
YEAST GENITAL QL WET PREP: ABNORMAL

## 2023-08-29 PROCEDURE — 63600175 PHARM REV CODE 636 W HCPCS: Performed by: PHYSICIAN ASSISTANT

## 2023-08-29 PROCEDURE — 87086 URINE CULTURE/COLONY COUNT: CPT | Performed by: PHYSICIAN ASSISTANT

## 2023-08-29 PROCEDURE — 87591 N.GONORRHOEAE DNA AMP PROB: CPT | Performed by: PHYSICIAN ASSISTANT

## 2023-08-29 PROCEDURE — 87186 SC STD MICRODIL/AGAR DIL: CPT | Performed by: PHYSICIAN ASSISTANT

## 2023-08-29 PROCEDURE — 87210 SMEAR WET MOUNT SALINE/INK: CPT | Performed by: PHYSICIAN ASSISTANT

## 2023-08-29 PROCEDURE — 87077 CULTURE AEROBIC IDENTIFY: CPT | Performed by: PHYSICIAN ASSISTANT

## 2023-08-29 PROCEDURE — 96372 THER/PROPH/DIAG INJ SC/IM: CPT | Performed by: PHYSICIAN ASSISTANT

## 2023-08-29 PROCEDURE — 87491 CHLMYD TRACH DNA AMP PROBE: CPT | Performed by: PHYSICIAN ASSISTANT

## 2023-08-29 PROCEDURE — 81000 URINALYSIS NONAUTO W/SCOPE: CPT | Performed by: PHYSICIAN ASSISTANT

## 2023-08-29 PROCEDURE — 99284 EMERGENCY DEPT VISIT MOD MDM: CPT

## 2023-08-29 RX ORDER — CEFTRIAXONE 500 MG/1
500 INJECTION, POWDER, FOR SOLUTION INTRAMUSCULAR; INTRAVENOUS
Status: COMPLETED | OUTPATIENT
Start: 2023-08-29 | End: 2023-08-29

## 2023-08-29 RX ORDER — METRONIDAZOLE 500 MG/1
500 TABLET ORAL EVERY 12 HOURS
Qty: 14 TABLET | Refills: 0 | Status: SHIPPED | OUTPATIENT
Start: 2023-08-29 | End: 2023-09-05

## 2023-08-29 RX ORDER — NITROFURANTOIN 25; 75 MG/1; MG/1
100 CAPSULE ORAL 2 TIMES DAILY
Qty: 10 CAPSULE | Refills: 0 | Status: SHIPPED | OUTPATIENT
Start: 2023-08-29 | End: 2023-09-03

## 2023-08-29 RX ORDER — DOXYCYCLINE 100 MG/1
100 CAPSULE ORAL 2 TIMES DAILY
Qty: 20 CAPSULE | Refills: 0 | Status: SHIPPED | OUTPATIENT
Start: 2023-08-29 | End: 2023-09-08

## 2023-08-29 RX ADMIN — CEFTRIAXONE SODIUM 500 MG: 500 INJECTION, POWDER, FOR SOLUTION INTRAMUSCULAR; INTRAVENOUS at 05:08

## 2023-08-29 NOTE — ED NOTES
Assumed care:  Carmen Landon is awake, alert and oriented x 3, skin warm and dry, in NAD.  Patient CO vaginal spotting, vaginal discharge with odor, and lower abd pain that started a few days ago.    Patient identifiers for Carmen Landon checked and correct.  LOC:  Carmen Landon is awake, alert, and aware of environment with an appropriate affect. She is oriented x 3 and speaking appropriately.  APPEARANCE:  She is resting comfortably and in no acute distress. She is clean and well groomed, patient's clothing is properly fastened.  SKIN:  The skin is warm and dry. She has normal skin turgor and moist mucus membranes. Skin is intact; no bruising or breakdown noted.  MUSCULOSKELETAL:  She is moving all extremities well, no obvious deformities noted. Pulses intact.   RESPIRATORY:  Airway is open and patent. Respirations are spontaneous and non-labored with normal effort and rate.  CARDIAC:  She has a normal rate and rhythm. No peripheral edema noted. Capillary refill < 3 seconds.  ABDOMEN:  No distention noted.  Soft and non-tender upon palpation.  Lower abd pain  NEUROLOGICAL:  PERRL. Facial expression is symmetrical. Hand grasps are equal bilaterally. Normal sensation in all extremities when touched with finger.  Allergies reported:  Review of patient's allergies indicates:  No Known Allergies

## 2023-08-29 NOTE — ED NOTES
"Patient states she has been post menopausal x 7 years and this is all new, began with spotting, then the foul smell, had a heavy flow and is now burning and hurting worse states she feels like "everything is going to fall out"   "

## 2023-08-30 NOTE — ED PROVIDER NOTES
Encounter Date: 8/29/2023       History     Chief Complaint   Patient presents with    Female  Problem     Patient states she is having itching burning, vaginal discharge and bleeding      Carmen Landon is a 48 y.o. female presenting for evaluation of burning with urination, vaginal irritation, vaginal discharge for the last several days.  Patient states she is concerned about possible STDs.  No fever, no chills.  She has been postmenopausal since 2019.  She does not currently have a gynecologist.  She has a past medical history of Addiction to drug, Alcohol abuse, Allergy, Anemia, Cirrhosis, Hepatitis C virus infection cured after antiviral drug therapy, History of psychiatric hospitalization, psychiatric care, Meningitis, and Psychiatric problem.      The history is provided by the patient.     Review of patient's allergies indicates:  No Known Allergies  Past Medical History:   Diagnosis Date    Addiction to drug     Alcohol abuse     Allergy     Anemia     Cirrhosis     Hepatitis C virus infection cured after antiviral drug therapy     s/p 24 wks epclusa - treated / cured. (SVR12 - 11/2021)    History of psychiatric hospitalization     alcohol related    Hx of psychiatric care     Meningitis     Psychiatric problem      Past Surgical History:   Procedure Laterality Date    ESOPHAGOGASTRODUODENOSCOPY N/A 5/10/2021    Procedure: EGD (ESOPHAGOGASTRODUODENOSCOPY);  Surgeon: Juan Manuel Sahni MD;  Location: [facility];  Service: Endoscopy;  Laterality: N/A;    ESOPHAGOGASTRODUODENOSCOPY N/A 7/3/2023    Procedure: EGD (ESOPHAGOGASTRODUODENOSCOPY);  Surgeon: Juan Manuel Sahni MD;  Location: [facility];  Service: Endoscopy;  Laterality: N/A;    TUBAL LIGATION       Family History   Problem Relation Age of Onset    Alcohol abuse Mother     Kidney disease Mother     Heart disease Father      Social History     Tobacco Use    Smoking status: Former     Current packs/day: 0.00     Types: Cigarettes     Quit  date: 2023     Years since quittin.3    Smokeless tobacco: Never   Substance Use Topics    Alcohol use: Not Currently     Comment: 1 pint of Whiskey per day.     Drug use: Yes     Types: Marijuana     Comment: history of heroin and cocaine     Review of Systems   Constitutional:  Negative for chills and fever.   Respiratory:  Negative for cough, chest tightness, shortness of breath and wheezing.    Cardiovascular:  Negative for chest pain and palpitations.   Gastrointestinal:  Negative for abdominal pain, constipation, diarrhea, nausea and vomiting.   Genitourinary:  Positive for dysuria, vaginal discharge and vaginal pain. Negative for hematuria.   Musculoskeletal:  Negative for arthralgias, back pain, joint swelling, myalgias, neck pain and neck stiffness.   Skin:  Negative for color change, pallor, rash and wound.   Neurological:  Negative for weakness and numbness.   Hematological:  Does not bruise/bleed easily.       Physical Exam     Initial Vitals [23 1644]   BP Pulse Resp Temp SpO2   (!) 149/93 94 20 97.9 °F (36.6 °C) 99 %      MAP       --         Physical Exam    Nursing note and vitals reviewed.  Constitutional: She appears well-developed and well-nourished. She is not diaphoretic. No distress.   HENT:   Head: Normocephalic and atraumatic.   Mouth/Throat: Oropharynx is clear and moist.   Eyes: Conjunctivae are normal.   Neck: Neck supple.   Normal range of motion.  Cardiovascular:  Normal rate, regular rhythm, normal heart sounds and intact distal pulses.     Exam reveals no gallop and no friction rub.       No murmur heard.  Pulmonary/Chest: Breath sounds normal. No respiratory distress. She has no wheezes. She has no rhonchi. She has no rales.   Abdominal: Abdomen is soft. She exhibits no distension and no mass. There is no abdominal tenderness.   Genitourinary:    Genitourinary Comments: Small amount of white vaginal discharge noted, without cervical motion tenderness.      Musculoskeletal:         General: No tenderness or edema. Normal range of motion.      Cervical back: Normal range of motion and neck supple.     Neurological: She is alert and oriented to person, place, and time. She has normal strength. No sensory deficit.   Skin: Skin is warm and dry. No rash and no abscess noted. No erythema.   Psychiatric: She has a normal mood and affect.         ED Course   Procedures  Labs Reviewed   VAGINAL SCREEN - Abnormal; Notable for the following components:       Result Value    Clue Cells Occasional (*)     WBC - Vaginal Screen Many (*)     Bacteria - Vaginal Screen Many (*)     All other components within normal limits    Narrative:     Release to patient->Immediate   URINALYSIS, REFLEX TO URINE CULTURE - Abnormal; Notable for the following components:    Protein, UA Trace (*)     Occult Blood UA 2+ (*)     Leukocytes, UA 2+ (*)     All other components within normal limits    Narrative:     Specimen Source->Urine   URINALYSIS MICROSCOPIC - Abnormal; Notable for the following components:    RBC, UA 10 (*)     WBC, UA 25 (*)     Bacteria Many (*)     All other components within normal limits    Narrative:     Specimen Source->Urine   CULTURE, URINE   C.TRACH/N.GONOR AMP RNA, VARIES          Imaging Results    None          Medications   cefTRIAXone injection 500 mg (500 mg Intramuscular Given 8/29/23 1759)     Medical Decision Making  Differential diagnosis:  UTI  GC chlamydia  Bacterial vaginosis  Vaginitis    Pt emergently evaluated here in the ED.    UA is positive for infection and urine culture is pending.  Wet prep is positive for bacterial vaginosis, with positive clue cells.  We will treat with Flagyl.  Patient is also requesting empiric treatment for chlamydia and gonorrhea.  She is given a dose of IM Rocephin here in the emergency department.  She will be given a prescription for doxycycline, to start taking if her test is positive in the next couple of days.  In  addition, she is given a prescription for Flagyl for the bacterial vaginosis and Macrobid for the UTI.  She is encouraged to follow-up with gynecology for re-evaluation and further management is needed.  She voices understanding and is agreeable to the plan.  She is given specific return precautions.    Amount and/or Complexity of Data Reviewed  Labs: ordered.    Risk  Prescription drug management.                               Clinical Impression:   Final diagnoses:  [N76.0, B96.89] Bacterial vaginosis (Primary)  [N30.01] Acute cystitis with hematuria        ED Disposition Condition    Discharge Stable          ED Prescriptions       Medication Sig Dispense Start Date End Date Auth. Provider    doxycycline (VIBRAMYCIN) 100 MG Cap Take 1 capsule (100 mg total) by mouth 2 (two) times daily. for 10 days 20 capsule 8/29/2023 9/8/2023 Subha Mendes PA-C    metroNIDAZOLE (FLAGYL) 500 MG tablet Take 1 tablet (500 mg total) by mouth every 12 (twelve) hours. for 7 days 14 tablet 8/29/2023 9/5/2023 Subha Mendes PA-C    nitrofurantoin, macrocrystal-monohydrate, (MACROBID) 100 MG capsule Take 1 capsule (100 mg total) by mouth 2 (two) times daily. for 5 days 10 capsule 8/29/2023 9/3/2023 Subha Mendes PA-C          Follow-up Information       Follow up With Specialties Details Why Contact Info Additional Information    [facility] [facility] - ED Emergency Medicine  As needed, If symptoms worsen [address] [place] [address] [address] [place] [address] 1st floor    Emigdio Price MD Obstetrics and Gynecology  for GYN evaluation as needed [address] [facility]  [address]  [place] [address]  [phone]                Subha Mendes PA-C  08/29/23 2054

## 2023-08-31 LAB
BACTERIA UR CULT: ABNORMAL
C TRACH RRNA SPEC QL NAA+PROBE: NEGATIVE
N GONORRHOEA RRNA SPEC QL NAA+PROBE: NEGATIVE
N.GONORROHEAE, AMP RNA SOURCE: NORMAL
SPECIMEN SOURCE: NORMAL

## 2023-09-19 ENCOUNTER — TELEPHONE (OUTPATIENT)
Dept: HEPATOLOGY | Facility: CLINIC | Age: 48
End: 2023-09-19
Payer: COMMERCIAL

## 2023-09-19 ENCOUNTER — HOSPITAL ENCOUNTER (OUTPATIENT)
Dept: RADIOLOGY | Facility: HOSPITAL | Age: 48
Discharge: HOME | End: 2023-09-19
Attending: PHYSICIAN ASSISTANT
Payer: COMMERCIAL

## 2023-09-19 DIAGNOSIS — K74.60 HEPATIC CIRRHOSIS, UNSPECIFIED HEPATIC CIRRHOSIS TYPE, UNSPECIFIED WHETHER ASCITES PRESENT: ICD-10-CM

## 2023-09-19 DIAGNOSIS — Z86.19 HEPATITIS C VIRUS INFECTION CURED AFTER ANTIVIRAL DRUG THERAPY: ICD-10-CM

## 2023-09-19 PROCEDURE — 76705 ECHO EXAM OF ABDOMEN: CPT | Mod: TC

## 2023-09-19 PROCEDURE — 76705 US ABDOMEN LIMITED: ICD-10-PCS | Mod: 26,,, | Performed by: RADIOLOGY

## 2023-09-19 PROCEDURE — 76705 ECHO EXAM OF ABDOMEN: CPT | Mod: 26,,, | Performed by: RADIOLOGY

## 2023-09-19 NOTE — TELEPHONE ENCOUNTER
Patient was a no-show for scheduled appt with PA Scheuermann.  Letter sent asking that she contact hepatology for rescheduling.

## 2023-09-19 NOTE — TELEPHONE ENCOUNTER
----- Message from Ac Roth sent at 9/19/2023  9:15 AM CDT -----  Regarding: Consult/Advisory  Contact: Carmen Landon  Consult/Advisory    Name Of Caller: Carmen Landon       Contact Preference: [phone] (home)        Nature of call: Patient calling to notify that she will not be able to make the meeting today. Patient states she was called in to work but she has US and labs done today. Requesting a call back.

## 2023-09-21 ENCOUNTER — PATIENT MESSAGE (OUTPATIENT)
Dept: HEPATOLOGY | Facility: CLINIC | Age: 48
End: 2023-09-21
Payer: COMMERCIAL

## 2023-10-02 ENCOUNTER — TELEPHONE (OUTPATIENT)
Dept: HEPATOLOGY | Facility: CLINIC | Age: 48
End: 2023-10-02
Payer: COMMERCIAL

## 2023-10-02 NOTE — TELEPHONE ENCOUNTER
----- Message from Sharon Allen MA sent at 9/28/2023  4:58 PM CDT -----  Regarding: FW: Reschedule virtual  Contact: [phone]    ----- Message -----  From: Chanelle Cali MA  Sent: 9/28/2023   1:57 PM CDT  To: Scheuermann Jennifer B Staff  Subject: Reschedule virtual                               Patient missed her virtual, requesting to reschedule.

## 2023-11-02 NOTE — PROGRESS NOTES
"HEPATOLOGY VIDEO VISIT NOTE - HCV clinic  The patient location is: at work  Visit type: audiovisual    Each patient to whom he or she provides medical services by telemedicine is:  (1) informed of the relationship between the physician and patient and the respective role of any other health care provider with respect to management of the patient; and (2) notified that he or she may decline to receive medical services by telemedicine and may withdraw from such care at any time.    CHIEF COMPLAINT: HCV Cirrhosis    HISTORY       This is a 48 y.o. White female with mildly decompensated (low MELD) cirrhosis due to HCV (treated / cured) and alcohol, being seen for f/u        Interval history:  Hospitalized earlier this month for suicidal ideations   Meds adjusted. Doing better w/ new regimen   Now on acamprosate to help w/ alcohol cessation. Doing well - 2 beers yest but no alcohol for 2 wks prior    Routine cirrhosis labs / imaging  U/S 3/3/23 - no concerning liver lesions, spleen normal. No ascites  Labs 3/3/23 - stable CBC, CMP, INR. Normal AFP. Peth 494    Current symptoms of hepatic decompensation:  Ascites / CLEVE - no  Diuretic use - no  TBili elevation - - no  HE / confusion / memory problems - yes   Mild confusion,  memory, sometimes "feels shaky"   Stopped her lactulose b/c "hard to get down"  EV bleed / hematemesis melena - no      C/o regular nausea, some vomiting  Significant GERD hx (worse when drinking) w/ Grade C esophagitis on EGD 2021  Rx w/ BID PPI had to be postponed due to HCV rx w/ epclusa, but was prescribed at last visit. Now on OTC protonix.   Never had f/u EGD that was recommended to assess for healing       Cirrhosis history:  Mildly decompensated, but stable: Hx of trace ascites on prior imaging, no para. (+) HE. Prior TBili elevations  (+) portal HTN: (+) PHG on EGD. No EV. plt and spleen normal     MELD-Na score: 6 at 3/3/2023  9:33 AM  MELD score: 6 at 3/3/2023  9:33 AM  Calculated " from:  Serum Creatinine: 0.7 mg/dL (Using min of 1 mg/dL) at 3/3/2023  9:33 AM  Serum Sodium: 137 mmol/L at 3/3/2023  9:33 AM  Total Bilirubin: 0.6 mg/dL (Using min of 1 mg/dL) at 3/3/2023  9:33 AM  INR(ratio): 1.0 at 3/3/2023  9:33 AM  Age: 48 years     Cirrhosis health maintenance:  - HCC screening - up to date 3/2023  - Varices screening -  EGD 5/2021 - no EV, (+) PHG  - prior resolved HBV  - lacking immunity to HAV    HCV history:  S/p 24 wks epclusa w/ SVR documented 1/2022 (cured)  - Treatment naive prior to epclusa  - Genotype 1a    PMH, PSH, SOCIAL HX, FAMILY HX      Reviewed in Epic  Pertinent findings:  FAMILY HX: neg for liver diease  SOCIAL HX:  Alcohol - yes, see above  Drugs - prior use      ROS: see HPI      PHYSICAL EXAM:  Friendly White female, in no acute distress; alert and oriented to person, place and time  LUNGS: Normal respiratory effort.  NEURO/PSYCH: Memory intact. Thought and speech pattern appropriate. Behavior normal. No depression or anxiety noted.      PERTINENT DIAGNOSTIC RESULTS      Lab Results   Component Value Date    WBC 7.75 03/06/2023    HGB 14.1 03/06/2023     03/06/2023     Lab Results   Component Value Date    INR 1.0 03/03/2023     Lab Results   Component Value Date    AST 33 03/06/2023    ALT 20 03/06/2023    BILITOT 0.4 03/06/2023    ALBUMIN 4.4 03/06/2023    ALKPHOS 84 03/06/2023    CREATININE 0.6 03/06/2023    BUN 12 03/06/2023     (L) 03/06/2023    K 3.6 03/06/2023    AFP 3.7 03/03/2023       Results for orders placed during the hospital encounter of 03/03/23  US Abdomen Limited  CLINICAL HISTORY: Cirrhosis.  COMPARISON: None.  TECHNIQUE: Real-time sonographic evaluation of the right lower quadrant was established utilizing both basal and color flow imaging within appropriate transducer with adequate penetrability.    FINDINGS: Liver is enlarged measuring 17.7 cm in the midclavicular line maintaining increased echogenicity pattern attributed towards  fibrofatty changes, although the surface contour of the liver normal. Small benign cystic focus within the left hepatic lobe measures roughly 1.4 cm in the widest dimension. No evidence of distention of the intrahepatic biliary radicles. The gallbladder is sonolucent without evidence of intraluminal echoes. The common duct measures normal at 4 mm in width is satisfactory posterior acoustic enhancement. The main portal vein demonstrates appropriate flow. The intrahepatic vessels empty normally into the inferior vena cava. The right kidney measures 10.3 cm in length with normal cortical thickness and uniform echotexture. No evidence of hydronephrosis.    IMPRESSION:  1. Hepatosplenomegaly with evidence of fibrofatty changes.  2. Benign anechoic cyst occupying the left hepatic lobe.  3. No evidence of acute sonographic findings.        ASSESSMENT        48 y.o. White female with:  1. MILDLY DECOMPENSATED CIRRHOSIS, stable  -- MELD score 6  -- HCC screening - up to date 3/2023  -- Varices screening - EGD 5/2021 - no EV, (+) PHG  -- hepatic encephalopathy - needs to resume lactulose  -- lacking immunity to HAV    2. HISTORY OF HEPATITIS C - cured  -- s/p 24 wks Epclusa w/ SVR 1/2022    3. GERD, NAUSEA, GRADE C ESOPHAGITIS ON PRIOR EGD  -- overdue for EGD to assess healing    4. HX OF ALCOHOL ABUSE        PLAN      1.  Resume lactulose. Discussed mixing it w/ something to make more palatable    2. EGD - varices screen, esophagitis f/u    3. CBC, CMP, INR, AFP, U/S, VISIT due 9/2023    4. HAV vaccine    Encouraged continued alcohol abstinence    __________________________________________________________________    Duration of encounter: 40 min  This includes face-to-face time and non face-to-face time preparing to see the patient (eg, review of tests), obtaining and/or reviewing separately obtained history, documenting clinical information in the electronic or other health record, independently interpreting resultsand  communicating results to the patient/family/caregiver, or care coordination.       Deep Sutures: 4-0 Vicryl

## 2023-11-30 ENCOUNTER — PATIENT MESSAGE (OUTPATIENT)
Dept: ADMINISTRATIVE | Facility: OTHER | Age: 48
End: 2023-11-30
Payer: COMMERCIAL

## 2023-11-30 ENCOUNTER — PATIENT MESSAGE (OUTPATIENT)
Dept: HEPATOLOGY | Facility: CLINIC | Age: 48
End: 2023-11-30
Payer: COMMERCIAL

## 2023-12-08 ENCOUNTER — PATIENT MESSAGE (OUTPATIENT)
Dept: HEPATOLOGY | Facility: CLINIC | Age: 48
End: 2023-12-08

## 2023-12-08 ENCOUNTER — OFFICE VISIT (OUTPATIENT)
Dept: HEPATOLOGY | Facility: CLINIC | Age: 48
End: 2023-12-08
Payer: COMMERCIAL

## 2023-12-08 DIAGNOSIS — B18.2 CHRONIC HEPATITIS C WITHOUT HEPATIC COMA: ICD-10-CM

## 2023-12-08 DIAGNOSIS — F10.21 ALCOHOL USE DISORDER, SEVERE, IN EARLY REMISSION: ICD-10-CM

## 2023-12-08 DIAGNOSIS — K76.82 HEPATIC ENCEPHALOPATHY: ICD-10-CM

## 2023-12-08 DIAGNOSIS — K74.60 HEPATIC CIRRHOSIS, UNSPECIFIED HEPATIC CIRRHOSIS TYPE, UNSPECIFIED WHETHER ASCITES PRESENT: Primary | ICD-10-CM

## 2023-12-08 PROCEDURE — 1160F PR REVIEW ALL MEDS BY PRESCRIBER/CLIN PHARMACIST DOCUMENTED: ICD-10-PCS | Mod: CPTII,95,, | Performed by: PHYSICIAN ASSISTANT

## 2023-12-08 PROCEDURE — 99214 OFFICE O/P EST MOD 30 MIN: CPT | Mod: 95,,, | Performed by: PHYSICIAN ASSISTANT

## 2023-12-08 PROCEDURE — 1159F MED LIST DOCD IN RCRD: CPT | Mod: CPTII,95,, | Performed by: PHYSICIAN ASSISTANT

## 2023-12-08 PROCEDURE — 99214 PR OFFICE/OUTPT VISIT, EST, LEVL IV, 30-39 MIN: ICD-10-PCS | Mod: 95,,, | Performed by: PHYSICIAN ASSISTANT

## 2023-12-08 PROCEDURE — 1160F RVW MEDS BY RX/DR IN RCRD: CPT | Mod: CPTII,95,, | Performed by: PHYSICIAN ASSISTANT

## 2023-12-08 PROCEDURE — 1159F PR MEDICATION LIST DOCUMENTED IN MEDICAL RECORD: ICD-10-PCS | Mod: CPTII,95,, | Performed by: PHYSICIAN ASSISTANT

## 2023-12-08 NOTE — PROGRESS NOTES
"HEPATOLOGY VIDEO VISIT NOTE  Visit type: audiovisual    Each patient to whom he or she provides medical services by telemedicine is:  (1) informed of the relationship between the physician and patient and the respective role of any other health care provider with respect to management of the patient; and (2) notified that he or she may decline to receive medical services by telemedicine and may withdraw from such care at any time.    CHIEF COMPLAINT: HCV Cirrhosis    HISTORY       This is a 48 y.o. White female with mildly decompensated (low MELD) cirrhosis due to HCV (treated / cured) and alcohol, being seen for f/u    Interval history:  Reports she is doing well  Still following closely w/ mental health provider  On naltrexone but they've discussed possible change to vivitrol  No alcohol for several months    Routine cirrhosis labs / imaging  U/S 9/2023 - no concerning liver lesions, spleen normal. No ascites  Labs 9/2023 - stable CBC, CMP, INR. Normal AFP.     Current symptoms of hepatic decompensation:  Ascites / CLEVE - no  Diuretic use - no  TBili elevation - no  HE - yes, mild. Well controlled w/ lactulose   Meds: Taking lactulose "mixing w/ juice" tolerating better   EV bleed / hematemesis melena - no    Cirrhosis history:  Mildly decompensated, stable:   Trace ascites on prior imaging, no para. (+) HE. Prior TBili elevations  (+) portal HTN: (+) PHG, no EV. plt and spleen normal     MELD 3.0: 7 at 9/19/2023  9:11 AM  MELD-Na: 6 at 9/19/2023  9:11 AM  Calculated from:  Serum Creatinine: 0.7 mg/dL (Using min of 1 mg/dL) at 9/19/2023  9:11 AM  Serum Sodium: 140 mmol/L (Using max of 137 mmol/L) at 9/19/2023  9:11 AM  Total Bilirubin: 0.2 mg/dL (Using min of 1 mg/dL) at 9/19/2023  9:11 AM  Serum Albumin: 3.9 g/dL (Using max of 3.5 g/dL) at 9/19/2023  9:11 AM  INR(ratio): 1.0 at 9/19/2023  9:11 AM  Age at listing (hypothetical): 48 years  Sex: Female at 9/19/2023  9:11 AM     Cirrhosis health maintenance:  - HCC " screening - up to date 9/2023  - Varices screening -  EGD 7/2023 - no EV, (+) PHG  - prior resolved HBV  - lacking immunity to HAV - s/p 1 dose vaccine 6/2023    HCV history:  S/p 24 wks epclusa w/ SVR documented 1/2022 (cured)  - Treatment naive prior to epclusa  - Genotype 1a    PMH, PSH, SOCIAL HX, FAMILY HX      Reviewed in Epic  Pertinent findings:  FAMILY HX: neg for liver diease  SOCIAL HX: resides in [place]. Working at local [employer] in [employer]  Alcohol - (+) history, see above  Drugs - prior use      ROS: see HPI      PHYSICAL EXAM:  Friendly White female, in no acute distress; alert and oriented to person, place and time  LUNGS: Normal respiratory effort.  NEURO/PSYCH: Memory intact. Thought and speech pattern appropriate. Behavior normal. No depression or anxiety noted.      PERTINENT DIAGNOSTIC RESULTS      Lab Results   Component Value Date    WBC 5.18 09/19/2023    HGB 12.4 09/19/2023     09/19/2023     Lab Results   Component Value Date    INR 1.0 09/19/2023     Lab Results   Component Value Date    AST 36 09/19/2023    ALT 29 09/19/2023    BILITOT 0.2 09/19/2023    ALBUMIN 3.9 09/19/2023    ALKPHOS 98 09/19/2023    CREATININE 0.7 09/19/2023    BUN 11 09/19/2023     09/19/2023    K 3.2 (L) 09/19/2023    AFP 6.0 09/19/2023     Results for orders placed during the hospital encounter of 09/19/23    US Abdomen Limited  CLINICAL HISTORY:Unspecified cirrhosis of liver  TECHNIQUE:Limited ultrasound of the right upper quadrant of the abdomen (including pancreas, liver, gallbladder, common bile duct, and right kidney) was performed.  COMPARISON:03/03/2023    FINDINGS:  The liver has a mildly nodular contour in keeping with cirrhosis.  A simple cyst of the right hepatic lobe is present measuring 2.0 cm.  There are no suspicious hepatic lesions.    The pancreas is unremarkable.  The gallbladder is within normal limits.  Sonographic Reyes sign is negative.  The common bile duct is normal  caliber at 3.5 mm.  The IVC is normal caliber.  The spleen is normal size at 11.1 cm.  There is no ascites.    Impression  Hepatic cirrhosis without suspicious focal lesion.    ASSESSMENT        48 y.o. White female with:  1. MILDLY DECOMPENSATED CIRRHOSIS, stable  -- MELD score 7  -- HCC screening - up to date 9/2023  -- Varices screening - EGD 7/2023 - no EV, (+) PHG  -- hepatic encephalopathy - stable on lactulose    2. HISTORY OF HEPATITIS C - cured  -- s/p 24 wks Epclusa w/ SVR 1/2022    3. ALCOHOL USE DISORDER  -- currently doing well on naltrexone, off alcohol for several months     PLAN      1. Continue lactulose  2. CBC, CMP, INR, AFP, Peth, U/S due 3/2024. Will review by phone  3. HAV vaccine dose #2 - pt can return to [facility]  4. Encouraged continued alcohol abstinence  5. EGD for EV screen due 7/2025    __________________________________________________________________    Duration of encounter: 30 min  This includes face-to-face time and non face-to-face time preparing to see the patient (eg, review of tests), obtaining and/or reviewing separately obtained history, documenting clinical information in the electronic or other health record, independently interpreting resultsand communicating results to the patient/family/caregiver, or care coordination.

## 2023-12-11 ENCOUNTER — TELEPHONE (OUTPATIENT)
Dept: HEPATOLOGY | Facility: CLINIC | Age: 48
End: 2023-12-11
Payer: COMMERCIAL

## 2023-12-11 NOTE — TELEPHONE ENCOUNTER
----- Message from Jennifer B. Scheuermann, PA sent at 12/8/2023 11:29 AM CST -----  Pls schedule CBC, CMP, INR, AFP, Peth, U/S due 3/2024.

## 2023-12-24 ENCOUNTER — HOSPITAL ENCOUNTER (EMERGENCY)
Facility: HOSPITAL | Age: 48
Discharge: HOME | End: 2023-12-24
Attending: STUDENT IN AN ORGANIZED HEALTH CARE EDUCATION/TRAINING PROGRAM
Payer: COMMERCIAL

## 2023-12-24 VITALS
OXYGEN SATURATION: 100 % | DIASTOLIC BLOOD PRESSURE: 94 MMHG | HEIGHT: 62 IN | WEIGHT: 135 LBS | HEART RATE: 75 BPM | RESPIRATION RATE: 16 BRPM | BODY MASS INDEX: 24.84 KG/M2 | SYSTOLIC BLOOD PRESSURE: 160 MMHG | TEMPERATURE: 98 F

## 2023-12-24 DIAGNOSIS — S01.01XA LACERATION OF SCALP, INITIAL ENCOUNTER: Primary | ICD-10-CM

## 2023-12-24 DIAGNOSIS — R51.9 ACUTE NONINTRACTABLE HEADACHE, UNSPECIFIED HEADACHE TYPE: ICD-10-CM

## 2023-12-24 DIAGNOSIS — S09.90XA TRAUMATIC INJURY OF HEAD, INITIAL ENCOUNTER: ICD-10-CM

## 2023-12-24 PROCEDURE — 12002 RPR S/N/AX/GEN/TRNK2.6-7.5CM: CPT

## 2023-12-24 PROCEDURE — 63600175 PHARM REV CODE 636 W HCPCS: Performed by: STUDENT IN AN ORGANIZED HEALTH CARE EDUCATION/TRAINING PROGRAM

## 2023-12-24 PROCEDURE — 90715 TDAP VACCINE 7 YRS/> IM: CPT | Performed by: STUDENT IN AN ORGANIZED HEALTH CARE EDUCATION/TRAINING PROGRAM

## 2023-12-24 PROCEDURE — 90471 IMMUNIZATION ADMIN: CPT | Performed by: STUDENT IN AN ORGANIZED HEALTH CARE EDUCATION/TRAINING PROGRAM

## 2023-12-24 PROCEDURE — 25000003 PHARM REV CODE 250: Performed by: STUDENT IN AN ORGANIZED HEALTH CARE EDUCATION/TRAINING PROGRAM

## 2023-12-24 PROCEDURE — 99285 EMERGENCY DEPT VISIT HI MDM: CPT | Mod: 25

## 2023-12-24 RX ORDER — ACETAMINOPHEN 325 MG/1
650 TABLET ORAL
Status: COMPLETED | OUTPATIENT
Start: 2023-12-24 | End: 2023-12-24

## 2023-12-24 RX ADMIN — TETANUS TOXOID, REDUCED DIPHTHERIA TOXOID AND ACELLULAR PERTUSSIS VACCINE, ADSORBED 0.5 ML: 5; 2.5; 8; 8; 2.5 SUSPENSION INTRAMUSCULAR at 02:12

## 2023-12-24 RX ADMIN — ACETAMINOPHEN 650 MG: 325 TABLET ORAL at 12:12

## 2023-12-24 NOTE — DISCHARGE INSTRUCTIONS
Return in 7 days for re-evaluation of staples and for removal.  Return sooner for any worsening symptoms.

## 2023-12-24 NOTE — ED PROVIDER NOTES
Encounter Date: 2023       History     Chief Complaint   Patient presents with    Head Laceration     48-year-old female presents for evaluation of head trauma and associated laceration.  Patient reports severe headache, no associated loss of consciousness or focal neurological deficits, no bleeding disorder or history of anticoagulation use.  Patient unsure if her tetanus is up-to-date.        Review of patient's allergies indicates:  No Known Allergies  Past Medical History:   Diagnosis Date    Addiction to drug     Alcohol abuse     Allergy     Anemia     Cirrhosis     Hepatitis C virus infection cured after antiviral drug therapy     s/p 24 wks epclusa - treated / cured. (SVR12 - 2021)    History of psychiatric hospitalization     alcohol related    Hx of psychiatric care     Meningitis     Psychiatric problem      Past Surgical History:   Procedure Laterality Date    ESOPHAGOGASTRODUODENOSCOPY N/A 5/10/2021    Procedure: EGD (ESOPHAGOGASTRODUODENOSCOPY);  Surgeon: Juan Manuel Sahni MD;  Location: [facility];  Service: Endoscopy;  Laterality: N/A;    ESOPHAGOGASTRODUODENOSCOPY N/A 7/3/2023    Procedure: EGD (ESOPHAGOGASTRODUODENOSCOPY);  Surgeon: Juan Manuel Sahni MD;  Location: [facility];  Service: Endoscopy;  Laterality: N/A;    TUBAL LIGATION       Family History   Problem Relation Age of Onset    Alcohol abuse Mother     Kidney disease Mother     Heart disease Father      Social History     Tobacco Use    Smoking status: Former     Current packs/day: 0.00     Types: Cigarettes     Quit date: 2023     Years since quittin.6    Smokeless tobacco: Never   Substance Use Topics    Alcohol use: Not Currently     Comment: 1 pint of Whiskey per day.     Drug use: Yes     Types: Marijuana     Comment: history of heroin and cocaine     Review of Systems   All other systems reviewed and are negative.      Physical Exam     Initial Vitals [23 1203]   BP Pulse Resp Temp SpO2   (!) 183/103 73 16  97.5 °F (36.4 °C) 100 %      MAP       --         Physical Exam    Nursing note and vitals reviewed.  Constitutional: She appears well-developed and well-nourished.   HENT:   Head: Normocephalic.   Scalp laceration and hematoma, no facial instability   Eyes: Right eye exhibits no discharge. Left eye exhibits no discharge.   Neck:   No C-spine tenderness palpation   Cardiovascular:  Normal rate and regular rhythm.           Pulmonary/Chest: Effort normal. No stridor. No respiratory distress.   Abdominal: Abdomen is soft. There is no abdominal tenderness.   Musculoskeletal:         General: No tenderness.     Neurological: She is alert.   No focal motor or sensory deficit noted   Skin: Skin is warm. No rash noted. No erythema.   5 cm laceration left posterolateral scalp, bleeding controlled, associated hematoma         ED Course   Lac Repair    Date/Time: 12/24/2023 12:01 PM    Performed by: José Aggarwal Jr., DO  Authorized by: José Aggarwal Jr., DO    Consent:     Consent obtained:  Emergent situation    Consent given by:  Patient    Risks discussed:  Infection, pain and poor cosmetic result  Laceration details:     Location:  Scalp    Scalp location:  L parietal    Length (cm):  5  Pre-procedure details:     Preparation:  Patient was prepped and draped in usual sterile fashion  Treatment:     Area cleansed with:  Ivone-Lou    Amount of cleaning:  Standard    Debridement:  None    Undermining:  None    Scar revision: no    Skin repair:     Repair method:  Staples    Number of staples:  7  Approximation:     Approximation:  Loose  Repair type:     Repair type:  Simple  Post-procedure details:     Dressing:  Open (no dressing)    Procedure completion:  Tolerated well, no immediate complications    Labs Reviewed - No data to display       Imaging Results              CT Head Without Contrast (Final result)  Result time 12/24/23 12:57:59      Final result by Vijay Lutz MD (12/24/23 12:57:59)                    Impression:      1. No acute intracranial CT findings.      Electronically signed by: Vijay Lutz  Date:    12/24/2023  Time:    12:57               Narrative:    EXAMINATION:  CT HEAD WITHOUT CONTRAST    CLINICAL HISTORY:  Head trauma, moderate-severe;    TECHNIQUE:  Low dose axial CT images obtained throughout the head without intravenous contrast. Sagittal and coronal reconstructions were performed.    COMPARISON:  CT head 05/15/2016.    FINDINGS:  Brain: There is no evidence of a mass, edema, midline shift, or intracranial hemorrhage. No extra-axial fluid collection. No CT evidence of an acute major vascular territorial infarct.    Ventricles: The ventricles, sulci, and cisterns are within normal limits.    Skull: The osseous structures are unremarkable in appearance.    Extracranial soft tissues: Left parietal scalp vertex injury.    Other: The visualized portions of the sinuses, orbits, and mastoid air cells are unremarkable.                                       Medications   Tdap (BOOSTRIX) vaccine injection 0.5 mL (has no administration in time range)   acetaminophen tablet 650 mg (650 mg Oral Given 12/24/23 1237)     Medical Decision Making  48-year-old female presents with traumatic head injury and associated headache.  Patient administered Tylenol for pain control and tetanus updated.  Differential diagnosis includes skull fracture, intracranial hemorrhage, concussion,Laceration      -no suspicion for any C-spine injury on exam, no other injury noted on exam, CT head with no acute fracture or hemorrhage, laceration closed via primary mechanism patient discharged in stable condition.  Patient instructed return in 1 week for staple removal        Amount and/or Complexity of Data Reviewed  Radiology: ordered.    Risk  OTC drugs.                                      Clinical Impression:  Final diagnoses:  [S01.01XA] Laceration of scalp, initial encounter (Primary)  [S09.90XA] Traumatic injury of head,  initial encounter  [R51.9] Acute nonintractable headache, unspecified headache type          ED Disposition Condition    Discharge Stable          ED Prescriptions    None       Follow-up Information       Follow up With Specialties Details Why Contact Info Additional Information    [facility] ED Emergency Medicine In 1 week For suture removal [address] [place] [address] [address] [place] [id number] 1st floor             José Aggarwal Jr., DO  12/24/23 8193

## 2024-01-01 ENCOUNTER — HOSPITAL ENCOUNTER (EMERGENCY)
Facility: HOSPITAL | Age: 49
Discharge: HOME | End: 2024-01-01
Attending: EMERGENCY MEDICINE
Payer: COMMERCIAL

## 2024-01-01 VITALS
HEART RATE: 99 BPM | RESPIRATION RATE: 18 BRPM | BODY MASS INDEX: 25.4 KG/M2 | DIASTOLIC BLOOD PRESSURE: 84 MMHG | TEMPERATURE: 98 F | HEIGHT: 62 IN | WEIGHT: 138 LBS | OXYGEN SATURATION: 99 % | SYSTOLIC BLOOD PRESSURE: 136 MMHG

## 2024-01-01 DIAGNOSIS — Z48.02: Primary | ICD-10-CM

## 2024-01-01 PROCEDURE — 99281 EMR DPT VST MAYX REQ PHY/QHP: CPT

## 2024-01-01 NOTE — ED NOTES
Assumed care:  Carmen Landon is awake, alert and oriented x 3, skin warm and dry, in NAD.  Patient presents to have 7 staples removed from scalp that were put in on 12/24/2023.  No redness or scabbing noted around staples.    Patient identifiers for Carmen Landon checked and correct.  LOC:  Carmen Landon is awake, alert, and aware of environment with an appropriate affect. She is oriented x 3 and speaking appropriately.  APPEARANCE:  She is resting comfortably and in no acute distress. She is clean and well groomed, patient's clothing is properly fastened.  SKIN:  The skin is warm and dry. She has normal skin turgor and moist mucus membranes. Scalp hematoma, 7 staples  MUSCULOSKELETAL:  She is moving all extremities well, no obvious deformities noted. Pulses intact.   RESPIRATORY:  Airway is open and patent. Respirations are spontaneous and non-labored with normal effort and rate.  CARDIAC:  She has a normal rate and rhythm. No peripheral edema noted. Capillary refill < 3 seconds.  ABDOMEN:  No distention noted.  Soft and non-tender upon palpation.  NEUROLOGICAL:  PERRL. Facial expression is symmetrical. Hand grasps are equal bilaterally. Normal sensation in all extremities when touched with finger.  Allergies reported:  Review of patient's allergies indicates:  No Known Allergies

## 2024-02-14 ENCOUNTER — OFFICE VISIT (OUTPATIENT)
Dept: FAMILY MEDICINE | Facility: CLINIC | Age: 49
End: 2024-02-14
Payer: MEDICAID

## 2024-02-14 VITALS
TEMPERATURE: 99 F | SYSTOLIC BLOOD PRESSURE: 136 MMHG | DIASTOLIC BLOOD PRESSURE: 88 MMHG | HEIGHT: 62 IN | WEIGHT: 131 LBS | OXYGEN SATURATION: 99 % | BODY MASS INDEX: 24.11 KG/M2 | HEART RATE: 76 BPM

## 2024-02-14 DIAGNOSIS — Z13.220 LIPID SCREENING: ICD-10-CM

## 2024-02-14 DIAGNOSIS — F31.9 BIPOLAR AFFECTIVE DISORDER, REMISSION STATUS UNSPECIFIED: Primary | ICD-10-CM

## 2024-02-14 DIAGNOSIS — F10.980 ALCOHOL-INDUCED ANXIETY DISORDER: ICD-10-CM

## 2024-02-14 DIAGNOSIS — K70.9 ALCOHOLIC LIVER DISEASE: ICD-10-CM

## 2024-02-14 DIAGNOSIS — F33.1 MODERATE EPISODE OF RECURRENT MAJOR DEPRESSIVE DISORDER: ICD-10-CM

## 2024-02-14 DIAGNOSIS — F10.21 ALCOHOL DEPENDENCE IN REMISSION: ICD-10-CM

## 2024-02-14 PROCEDURE — 3079F DIAST BP 80-89 MM HG: CPT | Mod: CPTII,,, | Performed by: NURSE PRACTITIONER

## 2024-02-14 PROCEDURE — 3075F SYST BP GE 130 - 139MM HG: CPT | Mod: CPTII,,, | Performed by: NURSE PRACTITIONER

## 2024-02-14 PROCEDURE — 1160F RVW MEDS BY RX/DR IN RCRD: CPT | Mod: CPTII,,, | Performed by: NURSE PRACTITIONER

## 2024-02-14 PROCEDURE — 99203 OFFICE O/P NEW LOW 30 MIN: CPT | Mod: S$PBB,,, | Performed by: NURSE PRACTITIONER

## 2024-02-14 PROCEDURE — 1159F MED LIST DOCD IN RCRD: CPT | Mod: CPTII,,, | Performed by: NURSE PRACTITIONER

## 2024-02-14 PROCEDURE — 99999 PR PBB SHADOW E&M-EST. PATIENT-LVL IV: CPT | Mod: PBBFAC,,, | Performed by: NURSE PRACTITIONER

## 2024-02-14 PROCEDURE — 3008F BODY MASS INDEX DOCD: CPT | Mod: CPTII,,, | Performed by: NURSE PRACTITIONER

## 2024-02-14 PROCEDURE — 99214 OFFICE O/P EST MOD 30 MIN: CPT | Mod: PBBFAC,PN | Performed by: NURSE PRACTITIONER

## 2024-02-14 RX ORDER — NALTREXONE HYDROCHLORIDE 50 MG/1
50 TABLET, FILM COATED ORAL
COMMUNITY
Start: 2023-09-13 | End: 2024-02-14 | Stop reason: SDUPTHER

## 2024-02-14 RX ORDER — ARIPIPRAZOLE 10 MG/1
10 TABLET ORAL
COMMUNITY
Start: 2023-12-04 | End: 2024-02-14 | Stop reason: SDUPTHER

## 2024-02-14 RX ORDER — ARIPIPRAZOLE 10 MG/1
10 TABLET ORAL DAILY
Qty: 30 TABLET | Refills: 5 | Status: SHIPPED | OUTPATIENT
Start: 2024-02-14

## 2024-02-14 RX ORDER — NALTREXONE HYDROCHLORIDE 50 MG/1
50 TABLET, FILM COATED ORAL 2 TIMES DAILY
Qty: 60 TABLET | Refills: 5 | Status: SHIPPED | OUTPATIENT
Start: 2024-02-14

## 2024-02-14 RX ORDER — ESCITALOPRAM OXALATE 10 MG/1
10 TABLET ORAL DAILY
Qty: 30 TABLET | Refills: 5 | Status: SHIPPED | OUTPATIENT
Start: 2024-02-14 | End: 2024-05-21

## 2024-02-14 NOTE — PROGRESS NOTES
Subjective:       Patient ID: Carmen Landon is a 49 y.o. female.    Chief Complaint: Establish Care and Medication Refill    Patient is a new patient to me here today to establish care and obtain medication refills.  Patient has a past history of depression, anxiety, alcohol addiction, drug addiction, cirrhosis of the liver, and hepatitis-C infection (treated and cured).  Patient is then need of refills today.  She does see hepatology every 3 months and obtain labs.  Labs have been stable.  Patient is a normal weight for height with a BMI of 23.96    Medication Refill  This is a chronic problem. The current episode started more than 1 month ago. The problem occurs daily. The problem has been waxing and waning. Associated symptoms include abdominal pain. Pertinent negatives include no arthralgias, chest pain, congestion, coughing, fever, headaches, myalgias, nausea, rash, sore throat, swollen glands, urinary symptoms, vomiting or weakness. The symptoms are aggravated by stress and exertion. She has tried position changes, relaxation and rest for the symptoms. The treatment provided moderate relief.   Depression  Visit Type: follow-up  Patient presents with the following symptoms: depressed mood, excessive worry, irritability, nervousness/anxiety and panic.  Patient is not experiencing: confusion, palpitations, shortness of breath, suicidal ideas and suicidal planning.  Frequency of symptoms: occasionally   Severity: mild   Sleep quality: fair  Nighttime awakenings: occasional  Compliance with medications:  %      Review of Systems   Constitutional:  Positive for activity change and irritability. Negative for appetite change and fever.   HENT:  Negative for congestion, ear discharge, ear pain, sore throat, trouble swallowing and voice change.    Eyes:  Negative for photophobia, pain, discharge and visual disturbance.   Respiratory:  Negative for cough, chest tightness and shortness of breath.     Cardiovascular:  Negative for chest pain and palpitations.   Gastrointestinal:  Positive for abdominal pain. Negative for nausea and vomiting.        Cirrhosis   Endocrine: Negative for cold intolerance and heat intolerance.   Genitourinary:  Negative for difficulty urinating and dysuria.   Musculoskeletal:  Negative for arthralgias, gait problem and myalgias.   Skin:  Negative for rash.   Allergic/Immunologic: Negative for immunocompromised state.   Neurological:  Negative for speech difficulty, weakness and headaches.   Psychiatric/Behavioral:  Positive for depression, dysphoric mood and sleep disturbance. Negative for confusion, self-injury and suicidal ideas. The patient is nervous/anxious.        Past Medical History:   Diagnosis Date    Addiction to drug     Alcohol abuse     Allergy     Anemia     Cirrhosis     Hepatitis C virus infection cured after antiviral drug therapy     s/p 24 wks epclusa - treated / cured. (SVR12 - 2021)    History of psychiatric hospitalization     alcohol related    Hx of psychiatric care     Meningitis     Psychiatric problem       Past Surgical History:   Procedure Laterality Date    ESOPHAGOGASTRODUODENOSCOPY N/A 5/10/2021    Procedure: EGD (ESOPHAGOGASTRODUODENOSCOPY);  Surgeon: Juan Manuel Sahni MD;  Location: [facility];  Service: Endoscopy;  Laterality: N/A;    ESOPHAGOGASTRODUODENOSCOPY N/A 7/3/2023    Procedure: EGD (ESOPHAGOGASTRODUODENOSCOPY);  Surgeon: Juan Manuel Sahni MD;  Location: [facility];  Service: Endoscopy;  Laterality: N/A;    TUBAL LIGATION         Family History   Problem Relation Age of Onset    Alcohol abuse Mother     Kidney disease Mother     Heart disease Father        Social History     Socioeconomic History    Marital status:     Number of children: 2   Tobacco Use    Smoking status: Former     Current packs/day: 0.00     Types: Cigarettes     Quit date: 2023     Years since quittin.7    Smokeless tobacco: Current   Substance and  Sexual Activity    Alcohol use: Not Currently     Comment: 1 pint of Whiskey per day.     Drug use: Yes     Types: Marijuana     Comment: history of heroin and cocaine    Sexual activity: Not Currently     Birth control/protection: See Surgical Hx     Social Determinants of Health     Financial Resource Strain: High Risk (12/5/2023)    Overall Financial Resource Strain (CARDIA)     Difficulty of Paying Living Expenses: Very hard   Food Insecurity: Food Insecurity Present (12/5/2023)    Hunger Vital Sign     Worried About Running Out of Food in the Last Year: Often true     Ran Out of Food in the Last Year: Often true   Transportation Needs: Unmet Transportation Needs (12/5/2023)    PRAPARE - Transportation     Lack of Transportation (Medical): Yes     Lack of Transportation (Non-Medical): No   Physical Activity: Sufficiently Active (12/5/2023)    Exercise Vital Sign     Days of Exercise per Week: 6 days     Minutes of Exercise per Session: 60 min   Stress: No Stress Concern Present (12/5/2023)    [employer] - Occupational Stress Questionnaire     Feeling of Stress : Only a little   Social Connections: Unknown (12/5/2023)    Social Connection and Isolation Panel [NHANES]     Frequency of Communication with Friends and Family: More than three times a week     Frequency of Social Gatherings with Friends and Family: Twice a week     Active Member of Clubs or Organizations: No     Attends Club or Organization Meetings: Never     Marital Status:    Housing Stability: High Risk (12/5/2023)    Housing Stability Vital Sign     Unable to Pay for Housing in the Last Year: Yes     Number of Places Lived in the Last Year: 1     Unstable Housing in the Last Year: No       Current Outpatient Medications   Medication Sig Dispense Refill    lactulose (CHRONULAC) 20 gram/30 mL Soln Take 30mL 2-3 times daily with a goal of 3-4 BM's daily 2700 mL 5    multivitamin Tab Take 1 tablet by mouth once  "daily. 90 tablet 1    ARIPiprazole (ABILIFY) 10 MG Tab Take 1 tablet (10 mg total) by mouth once daily. 30 tablet 5    EScitalopram oxalate (LEXAPRO) 10 MG tablet Take 1 tablet (10 mg total) by mouth once daily. 30 tablet 5    FOLIC ACID ORAL Take by mouth.      naltrexone (DEPADE) 50 mg tablet Take 50 mg by mouth 2 (two) times a day. 60 tablet 5    trazodone HCl (TRAZODONE ORAL) Take by mouth.       No current facility-administered medications for this visit.       Review of patient's allergies indicates:  No Known Allergies  Objective:      Blood pressure 136/88, pulse 76, temperature 98.5 °F (36.9 °C), height 5' 2" (1.575 m), weight 59.4 kg (131 lb), last menstrual period 10/08/2019, SpO2 99 %. Body mass index is 23.96 kg/m².   Physical Exam  Vitals and nursing note reviewed.   Constitutional:       General: She is not in acute distress.     Appearance: Normal appearance. She is well-developed. She is not ill-appearing.   HENT:      Head: Normocephalic and atraumatic.      Right Ear: External ear normal.      Left Ear: External ear normal.      Nose: Nose normal.      Mouth/Throat:      Mouth: Mucous membranes are moist.   Eyes:      General: Lids are normal. Lids are everted, no foreign bodies appreciated.      Conjunctiva/sclera: Conjunctivae normal.      Pupils: Pupils are equal, round, and reactive to light.      Right eye: Pupil is round and reactive.      Left eye: Pupil is round and reactive.   Neck:      Trachea: Trachea normal.   Cardiovascular:      Rate and Rhythm: Normal rate and regular rhythm.      Pulses: Normal pulses.      Heart sounds: Normal heart sounds, S1 normal and S2 normal.   Pulmonary:      Effort: Pulmonary effort is normal.      Breath sounds: Normal breath sounds.   Abdominal:      General: Abdomen is flat. Bowel sounds are normal.      Palpations: Abdomen is soft. Abdomen is not rigid.      Tenderness: There is no guarding.   Musculoskeletal:         General: Normal range of motion. "      Cervical back: Normal range of motion and neck supple. No muscular tenderness.   Lymphadenopathy:      Cervical: No cervical adenopathy.   Skin:     General: Skin is warm and dry.      Capillary Refill: Capillary refill takes less than 2 seconds.   Neurological:      General: No focal deficit present.      Mental Status: She is alert and oriented to person, place, and time.   Psychiatric:         Mood and Affect: Mood is anxious (controlled) and depressed (controlled).         Speech: Speech normal.         Behavior: Behavior normal. Behavior is cooperative.         Thought Content: Thought content normal.         Judgment: Judgment normal.             Assessment:       1. Bipolar affective disorder, remission status unspecified    2. Moderate episode of recurrent major depressive disorder    3. Alcohol-induced anxiety disorder    4. Alcoholic liver disease    5. Alcohol dependence in remission    6. Lipid screening    7. BMI 23.0-23.9, adult        Plan:       Carmen was seen today for establish care and medication refill.    Diagnoses and all orders for this visit:    Bipolar affective disorder, remission status unspecified  -     ARIPiprazole (ABILIFY) 10 MG Tab; Take 1 tablet (10 mg total) by mouth once daily.    Moderate episode of recurrent major depressive disorder  -     ARIPiprazole (ABILIFY) 10 MG Tab; Take 1 tablet (10 mg total) by mouth once daily.  -     EScitalopram oxalate (LEXAPRO) 10 MG tablet; Take 1 tablet (10 mg total) by mouth once daily.    Alcohol-induced anxiety disorder    Alcoholic liver disease    Alcohol dependence in remission  -     naltrexone (DEPADE) 50 mg tablet; Take 50 mg by mouth 2 (two) times a day.    Lipid screening  -     LIPID PANEL; Future    BMI 23.0-23.9, adult  Healthy diet and exercise encouraged    Follow up with me in 3 months for wellness visit with labs completed prior to office visit.

## 2024-03-28 ENCOUNTER — HOSPITAL ENCOUNTER (EMERGENCY)
Facility: HOSPITAL | Age: 49
Discharge: HOME | End: 2024-03-28
Attending: STUDENT IN AN ORGANIZED HEALTH CARE EDUCATION/TRAINING PROGRAM
Payer: MEDICAID

## 2024-03-28 VITALS
TEMPERATURE: 98 F | WEIGHT: 134.69 LBS | RESPIRATION RATE: 16 BRPM | BODY MASS INDEX: 24.64 KG/M2 | SYSTOLIC BLOOD PRESSURE: 129 MMHG | DIASTOLIC BLOOD PRESSURE: 77 MMHG | HEART RATE: 75 BPM

## 2024-03-28 DIAGNOSIS — K12.2 ORAL INFECTION: Primary | ICD-10-CM

## 2024-03-28 PROCEDURE — 99283 EMERGENCY DEPT VISIT LOW MDM: CPT

## 2024-03-28 RX ORDER — AMOXICILLIN AND CLAVULANATE POTASSIUM 875; 125 MG/1; MG/1
1 TABLET, FILM COATED ORAL 2 TIMES DAILY
Qty: 14 TABLET | Refills: 0 | Status: SHIPPED | OUTPATIENT
Start: 2024-03-28 | End: 2024-05-21

## 2024-03-28 NOTE — ED PROVIDER NOTES
Encounter Date: 3/28/2024       History     Chief Complaint   Patient presents with    Dental Problem     No teeth on bottom. Shellfish stuck her in the mouth this past weekend and feels like its infected. Lower right jaw     49-year-old female presents with oral pain.  Multiple comorbidities, triage listed concern for infection after eating shellfish.  Able to tolerate p.o., no significant associated swelling per patient, no trismus.  No concern for  allergic reaction    The history is provided by the patient.     Review of patient's allergies indicates:  No Known Allergies  Past Medical History:   Diagnosis Date    Addiction to drug     Alcohol abuse     Allergy     Anemia     Cirrhosis     Hepatitis C virus infection cured after antiviral drug therapy     s/p 24 wks epclusa - treated / cured. (SVR12 - 2021)    History of psychiatric hospitalization     alcohol related    Hx of psychiatric care     Meningitis     Psychiatric problem      Past Surgical History:   Procedure Laterality Date    ESOPHAGOGASTRODUODENOSCOPY N/A 5/10/2021    Procedure: EGD (ESOPHAGOGASTRODUODENOSCOPY);  Surgeon: Juan Manuel Sahni MD;  Location: [facility];  Service: Endoscopy;  Laterality: N/A;    ESOPHAGOGASTRODUODENOSCOPY N/A 7/3/2023    Procedure: EGD (ESOPHAGOGASTRODUODENOSCOPY);  Surgeon: Juan Manuel Sahni MD;  Location: [facility];  Service: Endoscopy;  Laterality: N/A;    TUBAL LIGATION       Family History   Problem Relation Age of Onset    Alcohol abuse Mother     Kidney disease Mother     Heart disease Father      Social History     Tobacco Use    Smoking status: Former     Current packs/day: 0.00     Types: Cigarettes     Quit date: 2023     Years since quittin.9    Smokeless tobacco: Current   Substance Use Topics    Alcohol use: Not Currently     Comment: 1 pint of Whiskey per day.     Drug use: Yes     Types: Marijuana     Comment: history of heroin and cocaine     Review of Systems   All other systems reviewed  and are negative.      Physical Exam     Initial Vitals [03/28/24 0341]   BP Pulse Resp Temp SpO2   129/77 75 16 97.9 °F (36.6 °C) --      MAP       --         Physical Exam    Nursing note and vitals reviewed.  Constitutional: No distress.   HENT:   Head: Normocephalic.   No teeth on lower mandible, multiple in the anterior lower mandibular portion and left lateral portion no evidence of any abscess   Eyes: No scleral icterus.   Cardiovascular:  Normal rate and regular rhythm.           Pulmonary/Chest: Effort normal. No stridor. No respiratory distress.   Abdominal: There is no guarding.     Neurological: She is alert.   Skin: No rash noted. No erythema.         ED Course   Procedures  Labs Reviewed - No data to display       Imaging Results    None          Medications - No data to display  Medical Decision Making  49-year-old female presents with oral pain concern for oral infection, clinically no evidence of any abscess or any airway compromise, no trismus.  Can speak quickly tolerate p.o., clinically has multiple oral ulcers.  Patient feels like she is starting to get infection and her mandible.  No asymmetrical facial swelling.  Patient requesting oral antibiotics.  No definitive abscess seen or airway compromise.  will treat with oral antibiotics per patient request.  Patient discharged with Augmentin.  Recommend close PCP follow up and strict return precautions for worsening symptoms., no suspicion for Michael Jona syndrome or erythema multiforme or TEN    Risk  Prescription drug management.                                      Clinical Impression:  Final diagnoses:  [K12.2] Oral infection (Primary)          ED Disposition Condition    Discharge Stable          ED Prescriptions       Medication Sig Dispense Start Date End Date Auth. Provider    amoxicillin-clavulanate 875-125mg (AUGMENTIN) 875-125 mg per tablet Take 1 tablet by mouth 2 (two) times daily. 14 tablet 3/28/2024 -- José Aggarwal Jr., DO           Follow-up Information       Follow up With Specialties Details Why Contact Info Additional Information    [facility] Emergency Medicine In 1 day As needed, If symptoms worsen [address] [place] [address] [address] [place] [phone] 1st floor             José Aggarwal Jr., DO  03/28/24 0352       José Aggarwal Jr., DO  03/28/24 0358

## 2024-04-04 ENCOUNTER — HOSPITAL ENCOUNTER (EMERGENCY)
Facility: HOSPITAL | Age: 49
Discharge: HOME | End: 2024-04-04
Attending: STUDENT IN AN ORGANIZED HEALTH CARE EDUCATION/TRAINING PROGRAM
Payer: MEDICAID

## 2024-04-04 VITALS
BODY MASS INDEX: 23.78 KG/M2 | RESPIRATION RATE: 16 BRPM | OXYGEN SATURATION: 95 % | TEMPERATURE: 99 F | SYSTOLIC BLOOD PRESSURE: 123 MMHG | HEART RATE: 85 BPM | DIASTOLIC BLOOD PRESSURE: 67 MMHG | WEIGHT: 130 LBS

## 2024-04-04 DIAGNOSIS — R10.9 RIGHT FLANK PAIN: ICD-10-CM

## 2024-04-04 DIAGNOSIS — R11.2 NAUSEA AND VOMITING, UNSPECIFIED VOMITING TYPE: Primary | ICD-10-CM

## 2024-04-04 DIAGNOSIS — N39.0 URINARY TRACT INFECTION WITH HEMATURIA, SITE UNSPECIFIED: ICD-10-CM

## 2024-04-04 DIAGNOSIS — E87.6 HYPOKALEMIA: ICD-10-CM

## 2024-04-04 DIAGNOSIS — R31.9 URINARY TRACT INFECTION WITH HEMATURIA, SITE UNSPECIFIED: ICD-10-CM

## 2024-04-04 DIAGNOSIS — E83.42 HYPOMAGNESEMIA: ICD-10-CM

## 2024-04-04 LAB
ALBUMIN SERPL BCP-MCNC: 3.9 G/DL (ref 3.5–5.2)
ALP SERPL-CCNC: 113 U/L (ref 55–135)
ALT SERPL W/O P-5'-P-CCNC: 39 U/L (ref 10–44)
ANION GAP SERPL CALC-SCNC: 15 MMOL/L (ref 8–16)
AST SERPL-CCNC: 46 U/L (ref 10–40)
BACTERIA #/AREA URNS HPF: ABNORMAL /HPF
BASOPHILS # BLD AUTO: 0.02 K/UL (ref 0–0.2)
BASOPHILS NFR BLD: 0.3 % (ref 0–1.9)
BILIRUB SERPL-MCNC: 0.7 MG/DL (ref 0.1–1)
BILIRUB UR QL STRIP: NEGATIVE
BUN SERPL-MCNC: 11 MG/DL (ref 6–20)
CALCIUM SERPL-MCNC: 9.3 MG/DL (ref 8.7–10.5)
CHLORIDE SERPL-SCNC: 96 MMOL/L (ref 95–110)
CLARITY UR: CLEAR
CO2 SERPL-SCNC: 27 MMOL/L (ref 23–29)
COLOR UR: YELLOW
CREAT SERPL-MCNC: 0.8 MG/DL (ref 0.5–1.4)
DIFFERENTIAL METHOD BLD: ABNORMAL
EOSINOPHIL # BLD AUTO: 0 K/UL (ref 0–0.5)
EOSINOPHIL NFR BLD: 0.2 % (ref 0–8)
ERYTHROCYTE [DISTWIDTH] IN BLOOD BY AUTOMATED COUNT: 13.9 % (ref 11.5–14.5)
EST. GFR  (NO RACE VARIABLE): >60 ML/MIN/1.73 M^2
GLUCOSE SERPL-MCNC: 98 MG/DL (ref 70–110)
GLUCOSE UR QL STRIP: NEGATIVE
HCT VFR BLD AUTO: 41.9 % (ref 37–48.5)
HGB BLD-MCNC: 14.5 G/DL (ref 12–16)
HGB UR QL STRIP: ABNORMAL
HYALINE CASTS #/AREA URNS LPF: 0 /LPF
IMM GRANULOCYTES # BLD AUTO: 0.02 K/UL (ref 0–0.04)
IMM GRANULOCYTES NFR BLD AUTO: 0.3 % (ref 0–0.5)
KETONES UR QL STRIP: NEGATIVE
LEUKOCYTE ESTERASE UR QL STRIP: ABNORMAL
LIPASE SERPL-CCNC: 87 U/L (ref 4–60)
LYMPHOCYTES # BLD AUTO: 0.6 K/UL (ref 1–4.8)
LYMPHOCYTES NFR BLD: 10 % (ref 18–48)
MAGNESIUM SERPL-MCNC: 1.4 MG/DL (ref 1.6–2.6)
MCH RBC QN AUTO: 33 PG (ref 27–31)
MCHC RBC AUTO-ENTMCNC: 34.6 G/DL (ref 32–36)
MCV RBC AUTO: 95 FL (ref 82–98)
MICROSCOPIC COMMENT: ABNORMAL
MONOCYTES # BLD AUTO: 0.2 K/UL (ref 0.3–1)
MONOCYTES NFR BLD: 3.1 % (ref 4–15)
NEUTROPHILS # BLD AUTO: 5.4 K/UL (ref 1.8–7.7)
NEUTROPHILS NFR BLD: 86.1 % (ref 38–73)
NITRITE UR QL STRIP: NEGATIVE
NRBC BLD-RTO: 0 /100 WBC
PH UR STRIP: 7 [PH] (ref 5–8)
PLATELET # BLD AUTO: 238 K/UL (ref 150–450)
PMV BLD AUTO: 9.3 FL (ref 9.2–12.9)
POTASSIUM SERPL-SCNC: 3 MMOL/L (ref 3.5–5.1)
PROT SERPL-MCNC: 8.1 G/DL (ref 6–8.4)
PROT UR QL STRIP: ABNORMAL
RBC # BLD AUTO: 4.39 M/UL (ref 4–5.4)
RBC #/AREA URNS HPF: 7 /HPF (ref 0–4)
SODIUM SERPL-SCNC: 138 MMOL/L (ref 136–145)
SP GR UR STRIP: >1.03 (ref 1–1.03)
SQUAMOUS #/AREA URNS HPF: 2 /HPF
URN SPEC COLLECT METH UR: ABNORMAL
UROBILINOGEN UR STRIP-ACNC: ABNORMAL EU/DL
WBC # BLD AUTO: 6.22 K/UL (ref 3.9–12.7)
WBC #/AREA URNS HPF: 6 /HPF (ref 0–5)

## 2024-04-04 PROCEDURE — 83690 ASSAY OF LIPASE: CPT | Performed by: STUDENT IN AN ORGANIZED HEALTH CARE EDUCATION/TRAINING PROGRAM

## 2024-04-04 PROCEDURE — 99285 EMERGENCY DEPT VISIT HI MDM: CPT | Mod: 25

## 2024-04-04 PROCEDURE — 85025 COMPLETE CBC W/AUTO DIFF WBC: CPT | Performed by: STUDENT IN AN ORGANIZED HEALTH CARE EDUCATION/TRAINING PROGRAM

## 2024-04-04 PROCEDURE — 25000003 PHARM REV CODE 250: Performed by: STUDENT IN AN ORGANIZED HEALTH CARE EDUCATION/TRAINING PROGRAM

## 2024-04-04 PROCEDURE — 96374 THER/PROPH/DIAG INJ IV PUSH: CPT

## 2024-04-04 PROCEDURE — 96375 TX/PRO/DX INJ NEW DRUG ADDON: CPT

## 2024-04-04 PROCEDURE — 83735 ASSAY OF MAGNESIUM: CPT | Performed by: STUDENT IN AN ORGANIZED HEALTH CARE EDUCATION/TRAINING PROGRAM

## 2024-04-04 PROCEDURE — 80053 COMPREHEN METABOLIC PANEL: CPT | Performed by: STUDENT IN AN ORGANIZED HEALTH CARE EDUCATION/TRAINING PROGRAM

## 2024-04-04 PROCEDURE — 36415 COLL VENOUS BLD VENIPUNCTURE: CPT | Performed by: STUDENT IN AN ORGANIZED HEALTH CARE EDUCATION/TRAINING PROGRAM

## 2024-04-04 PROCEDURE — 96361 HYDRATE IV INFUSION ADD-ON: CPT

## 2024-04-04 PROCEDURE — 63600175 PHARM REV CODE 636 W HCPCS: Performed by: STUDENT IN AN ORGANIZED HEALTH CARE EDUCATION/TRAINING PROGRAM

## 2024-04-04 PROCEDURE — 81000 URINALYSIS NONAUTO W/SCOPE: CPT | Performed by: STUDENT IN AN ORGANIZED HEALTH CARE EDUCATION/TRAINING PROGRAM

## 2024-04-04 RX ORDER — ONDANSETRON HYDROCHLORIDE 2 MG/ML
4 INJECTION, SOLUTION INTRAVENOUS
Status: COMPLETED | OUTPATIENT
Start: 2024-04-04 | End: 2024-04-04

## 2024-04-04 RX ORDER — AMOXICILLIN AND CLAVULANATE POTASSIUM 875; 125 MG/1; MG/1
1 TABLET, FILM COATED ORAL 2 TIMES DAILY
Qty: 6 TABLET | Refills: 0 | Status: SHIPPED | OUTPATIENT
Start: 2024-04-04 | End: 2024-04-07

## 2024-04-04 RX ORDER — KETOROLAC TROMETHAMINE 30 MG/ML
15 INJECTION, SOLUTION INTRAMUSCULAR; INTRAVENOUS
Status: COMPLETED | OUTPATIENT
Start: 2024-04-04 | End: 2024-04-04

## 2024-04-04 RX ORDER — LANOLIN ALCOHOL/MO/W.PET/CERES
400 CREAM (GRAM) TOPICAL ONCE
Status: DISCONTINUED | OUTPATIENT
Start: 2024-04-04 | End: 2024-04-04

## 2024-04-04 RX ORDER — LANOLIN ALCOHOL/MO/W.PET/CERES
400 CREAM (GRAM) TOPICAL ONCE
Status: COMPLETED | OUTPATIENT
Start: 2024-04-04 | End: 2024-04-04

## 2024-04-04 RX ADMIN — SODIUM CHLORIDE 1000 ML: 9 INJECTION, SOLUTION INTRAVENOUS at 06:04

## 2024-04-04 RX ADMIN — Medication 400 MG: at 08:04

## 2024-04-04 RX ADMIN — KETOROLAC TROMETHAMINE 15 MG: 30 INJECTION, SOLUTION INTRAMUSCULAR at 06:04

## 2024-04-04 RX ADMIN — ONDANSETRON 4 MG: 2 INJECTION INTRAMUSCULAR; INTRAVENOUS at 06:04

## 2024-04-04 RX ADMIN — POTASSIUM BICARBONATE 50 MEQ: 977.5 TABLET, EFFERVESCENT ORAL at 07:04

## 2024-04-04 NOTE — ED PROVIDER NOTES
"Encounter Date: 2024       History     Chief Complaint   Patient presents with    Vomiting     Started vomiting last night.  Bad lower back pain.     Urinary Frequency     Increased urine frequency and urine is "dark".      49-year-old female history of alcoholic cirrhosis presents with right-sided flank pain.  Associated nausea and vomiting and dark urine.  No trauma, fever or chills.    The history is provided by the patient.     Review of patient's allergies indicates:  No Known Allergies  Past Medical History:   Diagnosis Date    Addiction to drug     Alcohol abuse     Allergy     Anemia     Cirrhosis     Hepatitis C virus infection cured after antiviral drug therapy     s/p 24 wks epclusa - treated / cured. (SVR12 - 2021)    History of psychiatric hospitalization     alcohol related    Hx of psychiatric care     Meningitis     Psychiatric problem      Past Surgical History:   Procedure Laterality Date    ESOPHAGOGASTRODUODENOSCOPY N/A 5/10/2021    Procedure: EGD (ESOPHAGOGASTRODUODENOSCOPY);  Surgeon: Juan Manuel Sahni MD;  Location: [facility];  Service: Endoscopy;  Laterality: N/A;    ESOPHAGOGASTRODUODENOSCOPY N/A 7/3/2023    Procedure: EGD (ESOPHAGOGASTRODUODENOSCOPY);  Surgeon: Juan Manuel Sahni MD;  Location: [facility];  Service: Endoscopy;  Laterality: N/A;    TUBAL LIGATION       Family History   Problem Relation Age of Onset    Alcohol abuse Mother     Kidney disease Mother     Heart disease Father      Social History     Tobacco Use    Smoking status: Former     Current packs/day: 0.00     Types: Cigarettes     Quit date: 2023     Years since quittin.9    Smokeless tobacco: Current   Substance Use Topics    Alcohol use: Not Currently     Comment: 1 pint of Whiskey per day.     Drug use: Yes     Types: Marijuana     Comment: history of heroin and cocaine     Review of Systems   All other systems reviewed and are negative.      Physical Exam     Initial Vitals [24 1741]   BP Pulse " Resp Temp SpO2   (!) 144/85 98 19 98.6 °F (37 °C) 99 %      MAP       --         Physical Exam    Nursing note and vitals reviewed.  Constitutional: She is not diaphoretic.   HENT:   Head: Normocephalic.   Eyes: No scleral icterus.   Cardiovascular:  Normal rate.           Pulmonary/Chest: Effort normal. No stridor. No respiratory distress.   Abdominal:   Soft abdomen, nontender to palpation There is no guarding.   Genitourinary:    Genitourinary Comments: Right CVA tenderness palpation     Musculoskeletal:      Comments: No midline CT or L-spine tenderness palpation     Neurological: She is alert.   Skin: No rash noted. No erythema.         ED Course   Procedures  Labs Reviewed   URINALYSIS, REFLEX TO URINE CULTURE - Abnormal; Notable for the following components:       Result Value    Specific Gravity, UA >1.030 (*)     Protein, UA 1+ (*)     Occult Blood UA 2+ (*)     Urobilinogen, UA 2.0-3.0 (*)     Leukocytes, UA 1+ (*)     All other components within normal limits    Narrative:     Specimen Source->Urine   CBC W/ AUTO DIFFERENTIAL - Abnormal; Notable for the following components:    MCH 33.0 (*)     Lymph # 0.6 (*)     Mono # 0.2 (*)     Gran % 86.1 (*)     Lymph % 10.0 (*)     Mono % 3.1 (*)     All other components within normal limits   COMPREHENSIVE METABOLIC PANEL - Abnormal; Notable for the following components:    Potassium 3.0 (*)     AST 46 (*)     All other components within normal limits   LIPASE - Abnormal; Notable for the following components:    Lipase 87 (*)     All other components within normal limits   URINALYSIS MICROSCOPIC - Abnormal; Notable for the following components:    RBC, UA 7 (*)     WBC, UA 6 (*)     All other components within normal limits    Narrative:     Specimen Source->Urine   MAGNESIUM - Abnormal; Notable for the following components:    Magnesium 1.4 (*)     All other components within normal limits          Imaging Results              CT Renal Stone Study ABD Pelvis WO  (Final result)  Result time 04/04/24 18:43:12      Final result by Cintia Tony MD (04/04/24 18:43:12)                   Impression:      No opaque renal or ureteral stone, hydronephrosis, ureteral dilatation or ureteral obstruction is seen.  There is hepatomegaly and diffuse fatty infiltration of the liver.  Prominent amount of fluid within the stomach and mildly prominent amount of fluid within small bowel and recommend correlation clinically particularly as to when the patient last ate and drank.      Electronically signed by: Cintia Tony MD  Date:    04/04/2024  Time:    18:43               Narrative:    EXAMINATION:  CT RENAL STONE STUDY ABD PELVIS WO    CLINICAL HISTORY:  Flank pain, kidney stone suspected;    TECHNIQUE:  Low dose axial images, sagittal and coronal reformations were obtained from the lung bases to the pubic symphysis.  Contrast was not administered.    COMPARISON:  08/22/2022    FINDINGS:  Hepatomegaly and diffuse fatty infiltration of the liver.  Spleen not enlarged.  Calcified granuloma in the spleen.  Adrenal glands unremarkable appearance.  Pancreas unremarkable appearance.  Abdominal aorta tapers without aneurysmal dilatation.  No calcified stones the gallbladder or CT findings of acute cholecystitis and no biliary duct dilatation.    There is prominent amount of stool within the colon.  There is no free intraperitoneal air or fluid.  Normal appearance of the appendix.  Prominent amount of fluid within the stomach and recommend correlation clinically as to when the patient last ate and drank.  No appreciable bowel wall thickening or inflammatory change mildly prominent amount of fluid within small bowel.    Kidneys, ureters, bladder; no hydronephrosis opaque renal or ureteral stone or ureteral obstruction.  Unremarkable appearance of the kidneys.  Urinary bladder decompressed at the time of the exam    Reproductive organs; as visualized uterus and adnexal region unremarkable  appearance for the patient's age.    There is a tiny fat containing umbilical hernia.  Mild osseous degenerative changes.                                       Medications   sodium chloride 0.9% bolus 1,000 mL 1,000 mL (0 mLs Intravenous Stopped 4/4/24 1922)   ondansetron injection 4 mg (4 mg Intravenous Given 4/4/24 1822)   ketorolac injection 15 mg (15 mg Intravenous Given 4/4/24 1822)   potassium bicarbonate disintegrating tablet 50 mEq (50 mEq Oral Given 4/4/24 1949)   magnesium oxide tablet 400 mg (400 mg Oral Given 4/4/24 2002)     Medical Decision Making  49-year-old female presents with flank pain and nausea and vomiting and some dysuria.  History of cirrhosis.  Workup initiated with hypokalemia hypomagnesium.  Patient administered IV fluids, IV Toradol and IV antiemetic.  Patient able to tolerate oral magnesium and oral potassium.  CT imaging with no acute medical or surgical emergency.  No definitive bowel obstruction.  Imaging not obtained with oral contrast due to lower suspicion for bowel obstruction from history and physical exam.  Given patient's dysuria treated as symptomatic bacturia with oral antibiotics.  Patient agreeable with plan stable for discharge and able to tolerate p.o..    Amount and/or Complexity of Data Reviewed  Labs: ordered. Decision-making details documented in ED Course.  Radiology: ordered.    Risk  OTC drugs.  Prescription drug management.               ED Course as of 04/04/24 2025   Thu Apr 04, 2024 1910 WBC: 6.22 [KB]   1910 Hemoglobin: 14.5 [KB]   1910 Hematocrit: 41.9 [KB]   1931 Leukocyte Esterase, UA(!): 1+ [KB]   1931 UROBILINOGEN UA(!): 2.0-3.0 [KB]   1932 Potassium(!): 3.0 [KB]   1932 Lipase(!): 87 [KB]   1932 RBC, UA(!): 7 [KB]   1932 WBC, UA(!): 6 [KB]   1932 Squam Epithel, UA: 2 [KB]   1952 Magnesium (!): 1.4 [KB]      ED Course User Index  [KB] José Aggarwal Jr., DO                           Clinical Impression:  Final diagnoses:  [R10.9] Right flank  pain  [R11.2] Nausea and vomiting, unspecified vomiting type (Primary)  [E87.6] Hypokalemia  [E83.42] Hypomagnesemia  [N39.0, R31.9] Urinary tract infection with hematuria, site unspecified          ED Disposition Condition    Discharge Stable          ED Prescriptions       Medication Sig Dispense Start Date End Date Auth. Provider    amoxicillin-clavulanate 875-125mg (AUGMENTIN) 875-125 mg per tablet Take 1 tablet by mouth 2 (two) times daily. for 3 days 6 tablet 4/4/2024 4/7/2024 José Aggarwal Jr., DO    potassium bicarbonate (K-LYTE) disintegrating tablet Take 1 tablet (25 mEq total) by mouth once daily. for 4 days 4 tablet 4/4/2024 4/8/2024 José Aggarwal Jr., DO          Follow-up Information    None          José Aggarwal Jr.,   04/04/24 2025

## 2024-04-05 NOTE — DISCHARGE INSTRUCTIONS
Follow up primary care physician 1 week for repeat evaluation and  return to ED for any worsening symptoms

## 2024-04-17 ENCOUNTER — TELEPHONE (OUTPATIENT)
Dept: HEPATOLOGY | Facility: CLINIC | Age: 49
End: 2024-04-17

## 2024-04-23 ENCOUNTER — HOSPITAL ENCOUNTER (EMERGENCY)
Facility: HOSPITAL | Age: 49
Discharge: HOME | End: 2024-04-23
Attending: EMERGENCY MEDICINE
Payer: MEDICAID

## 2024-04-23 VITALS
HEART RATE: 77 BPM | OXYGEN SATURATION: 99 % | RESPIRATION RATE: 17 BRPM | BODY MASS INDEX: 24.84 KG/M2 | SYSTOLIC BLOOD PRESSURE: 146 MMHG | HEIGHT: 62 IN | DIASTOLIC BLOOD PRESSURE: 87 MMHG | WEIGHT: 135 LBS | TEMPERATURE: 98 F

## 2024-04-23 DIAGNOSIS — M54.2 NECK PAIN: ICD-10-CM

## 2024-04-23 DIAGNOSIS — S16.1XXA STRAIN OF NECK MUSCLE, INITIAL ENCOUNTER: ICD-10-CM

## 2024-04-23 DIAGNOSIS — M62.838 MUSCLE SPASMS OF NECK: Primary | ICD-10-CM

## 2024-04-23 DIAGNOSIS — E87.6 HYPOKALEMIA: ICD-10-CM

## 2024-04-23 LAB
ANION GAP SERPL CALC-SCNC: 9 MMOL/L (ref 8–16)
BASOPHILS # BLD AUTO: 0.05 K/UL (ref 0–0.2)
BASOPHILS NFR BLD: 0.8 % (ref 0–1.9)
BUN SERPL-MCNC: 6 MG/DL (ref 6–20)
CALCIUM SERPL-MCNC: 9.5 MG/DL (ref 8.7–10.5)
CHLORIDE SERPL-SCNC: 97 MMOL/L (ref 95–110)
CO2 SERPL-SCNC: 30 MMOL/L (ref 23–29)
CREAT SERPL-MCNC: 0.7 MG/DL (ref 0.5–1.4)
CRP SERPL-MCNC: 1 MG/L (ref 0–8.2)
DIFFERENTIAL METHOD BLD: ABNORMAL
EOSINOPHIL # BLD AUTO: 0.1 K/UL (ref 0–0.5)
EOSINOPHIL NFR BLD: 1.1 % (ref 0–8)
ERYTHROCYTE [DISTWIDTH] IN BLOOD BY AUTOMATED COUNT: 13.8 % (ref 11.5–14.5)
ERYTHROCYTE [SEDIMENTATION RATE] IN BLOOD BY WESTERGREN METHOD: 26 MM/HR (ref 0–20)
EST. GFR  (NO RACE VARIABLE): >60 ML/MIN/1.73 M^2
GLUCOSE SERPL-MCNC: 112 MG/DL (ref 70–110)
HCT VFR BLD AUTO: 39.7 % (ref 37–48.5)
HGB BLD-MCNC: 13.6 G/DL (ref 12–16)
IMM GRANULOCYTES # BLD AUTO: 0.01 K/UL (ref 0–0.04)
IMM GRANULOCYTES NFR BLD AUTO: 0.2 % (ref 0–0.5)
LYMPHOCYTES # BLD AUTO: 1.8 K/UL (ref 1–4.8)
LYMPHOCYTES NFR BLD: 27.8 % (ref 18–48)
MCH RBC QN AUTO: 33.1 PG (ref 27–31)
MCHC RBC AUTO-ENTMCNC: 34.3 G/DL (ref 32–36)
MCV RBC AUTO: 97 FL (ref 82–98)
MONOCYTES # BLD AUTO: 0.6 K/UL (ref 0.3–1)
MONOCYTES NFR BLD: 9 % (ref 4–15)
NEUTROPHILS # BLD AUTO: 3.9 K/UL (ref 1.8–7.7)
NEUTROPHILS NFR BLD: 61.1 % (ref 38–73)
NRBC BLD-RTO: 0 /100 WBC
PLATELET # BLD AUTO: 248 K/UL (ref 150–450)
PMV BLD AUTO: 8.6 FL (ref 9.2–12.9)
POTASSIUM SERPL-SCNC: 3.3 MMOL/L (ref 3.5–5.1)
RBC # BLD AUTO: 4.11 M/UL (ref 4–5.4)
SODIUM SERPL-SCNC: 136 MMOL/L (ref 136–145)
WBC # BLD AUTO: 6.34 K/UL (ref 3.9–12.7)

## 2024-04-23 PROCEDURE — 25000003 PHARM REV CODE 250: Performed by: EMERGENCY MEDICINE

## 2024-04-23 PROCEDURE — 85025 COMPLETE CBC W/AUTO DIFF WBC: CPT | Performed by: EMERGENCY MEDICINE

## 2024-04-23 PROCEDURE — 99284 EMERGENCY DEPT VISIT MOD MDM: CPT | Mod: 25

## 2024-04-23 PROCEDURE — 86140 C-REACTIVE PROTEIN: CPT | Performed by: EMERGENCY MEDICINE

## 2024-04-23 PROCEDURE — 80048 BASIC METABOLIC PNL TOTAL CA: CPT | Performed by: EMERGENCY MEDICINE

## 2024-04-23 PROCEDURE — 85651 RBC SED RATE NONAUTOMATED: CPT | Performed by: EMERGENCY MEDICINE

## 2024-04-23 PROCEDURE — 36415 COLL VENOUS BLD VENIPUNCTURE: CPT | Performed by: EMERGENCY MEDICINE

## 2024-04-23 PROCEDURE — 96372 THER/PROPH/DIAG INJ SC/IM: CPT | Performed by: EMERGENCY MEDICINE

## 2024-04-23 PROCEDURE — 63600175 PHARM REV CODE 636 W HCPCS: Performed by: EMERGENCY MEDICINE

## 2024-04-23 RX ORDER — KETOROLAC TROMETHAMINE 30 MG/ML
30 INJECTION, SOLUTION INTRAMUSCULAR; INTRAVENOUS
Status: COMPLETED | OUTPATIENT
Start: 2024-04-23 | End: 2024-04-23

## 2024-04-23 RX ORDER — POTASSIUM CHLORIDE 20 MEQ/1
40 TABLET, EXTENDED RELEASE ORAL
Status: COMPLETED | OUTPATIENT
Start: 2024-04-23 | End: 2024-04-23

## 2024-04-23 RX ORDER — KETOROLAC TROMETHAMINE 10 MG/1
10 TABLET, FILM COATED ORAL EVERY 8 HOURS PRN
Qty: 12 TABLET | Refills: 0 | Status: SHIPPED | OUTPATIENT
Start: 2024-04-23

## 2024-04-23 RX ORDER — ORPHENADRINE CITRATE 30 MG/ML
60 INJECTION INTRAMUSCULAR; INTRAVENOUS
Status: COMPLETED | OUTPATIENT
Start: 2024-04-23 | End: 2024-04-23

## 2024-04-23 RX ORDER — CYCLOBENZAPRINE HCL 10 MG
10 TABLET ORAL 3 TIMES DAILY PRN
Qty: 15 TABLET | Refills: 0 | Status: SHIPPED | OUTPATIENT
Start: 2024-04-23 | End: 2024-04-28

## 2024-04-23 RX ADMIN — POTASSIUM CHLORIDE 40 MEQ: 1500 TABLET, EXTENDED RELEASE ORAL at 11:04

## 2024-04-23 RX ADMIN — ORPHENADRINE CITRATE 60 MG: 60 INJECTION INTRAMUSCULAR; INTRAVENOUS at 10:04

## 2024-04-23 RX ADMIN — KETOROLAC TROMETHAMINE 30 MG: 30 INJECTION, SOLUTION INTRAMUSCULAR at 10:04

## 2024-04-23 NOTE — ED PROVIDER NOTES
Chief Complaint   Patient presents with    Cardiac Clearance       Patient Active Problem List    Diagnosis Date Noted    Intracranial carotid stenosis, bilateral 10/22/2021    PVD (peripheral vascular disease) with claudication ([facility] [address] 09/09/2021    Tobacco dependence 09/09/2021    Bilateral carotid bruits 09/09/2021    Bilateral carotid artery stenosis 09/09/2021     Overview Note:     Left-sided velocities consistent with greater than 80% stenosis, on the right side 40% stenosis         Current Outpatient Medications   Medication Sig Dispense Refill    amLODIPine (NORVASC) 5 MG tablet Take 5 mg by mouth daily      metoprolol succinate (TOPROL XL) 25 MG extended release tablet Take 25 mg by mouth daily      rosuvastatin (CRESTOR) 10 MG tablet Take 10 mg by mouth daily      aspirin 81 MG EC tablet Take 81 mg by mouth daily      cilostazol (PLETAL) 100 MG tablet Take 1 tablet by mouth 2 times daily 60 tablet 11     No current facility-administered medications for this visit.         No Known Allergies    Vitals:    10/27/21 1336   BP: (!) 164/76   Pulse: 78   Resp: 18   Weight: 167 lb (75.8 kg)   Height: 5' 7\" (1.702 m)       Social History     Socioeconomic History    Marital status:      Spouse name: Not on file    Number of children: Not on file    Years of education: Not on file    Highest education level: Not on file   Occupational History    Not on file   Tobacco Use    Smoking status: Current Every Day Smoker    Smokeless tobacco: Never Used   Substance and Sexual Activity    Alcohol use: Not Currently    Drug use: Never    Sexual activity: Not on file   Other Topics Concern    Not on file   Social History Narrative    Not on file     Social Determinants of Health     Financial Resource Strain:     Difficulty of Paying Living Expenses:    Food Insecurity:     Worried About Running Out of Food in the Last Year:     [address] in the Last Year:    Transportation Needs:     Encounter Date: 2024       History     Chief Complaint   Patient presents with    Neck Pain     Patient states she woke up with neck pain yesterday, unknown injury but hurts to move and was unable to sleep well and has caused a headache      Patient is a 49-year-old female with a past medical history of drug addiction alcohol abuse cirrhosis hepatitis-C prior psychiatric care who presents the emergency room for evaluation right-sided neck pain.  She awoke with the pain a few days ago.  She does not want to move her neck.  She denies any fevers.  She denies any trauma.  She has a history of IV drug use 12 years ago and states she has not used IV drugs since.  She has no history cancer.  She denies any history of fevers.      Review of patient's allergies indicates:  No Known Allergies  Past Medical History:   Diagnosis Date    Addiction to drug     Alcohol abuse     Allergy     Anemia     Cirrhosis     Hepatitis C virus infection cured after antiviral drug therapy     s/p 24 wks epclusa - treated / cured. (SVR12 - 2021)    History of psychiatric hospitalization     alcohol related    Hx of psychiatric care     Meningitis     Psychiatric problem      Past Surgical History:   Procedure Laterality Date    ESOPHAGOGASTRODUODENOSCOPY N/A 5/10/2021    Procedure: EGD (ESOPHAGOGASTRODUODENOSCOPY);  Surgeon: Juan Manuel Sahni MD;  Location: [facility];  Service: Endoscopy;  Laterality: N/A;    ESOPHAGOGASTRODUODENOSCOPY N/A 7/3/2023    Procedure: EGD (ESOPHAGOGASTRODUODENOSCOPY);  Surgeon: Juan Manuel Sahni MD;  Location: [facility];  Service: Endoscopy;  Laterality: N/A;    TUBAL LIGATION       Family History   Problem Relation Name Age of Onset    Alcohol abuse Mother      Kidney disease Mother      Heart disease Father       Social History     Tobacco Use    Smoking status: Former     Current packs/day: 0.00     Types: Cigarettes     Quit date: 2023     Years since quittin.9    Smokeless tobacco: Current    Substance Use Topics    Alcohol use: Not Currently     Comment: 1 pint of Whiskey per day.     Drug use: Yes     Types: Marijuana     Comment: history of heroin and cocaine     Review of Systems   Constitutional:  Negative for chills, fatigue and fever.   Respiratory:  Negative for cough, choking and shortness of breath.    Cardiovascular:  Negative for chest pain.   Gastrointestinal:  Negative for abdominal pain, diarrhea, nausea and vomiting.   Genitourinary:  Negative for dysuria and flank pain.   Musculoskeletal:  Positive for neck pain. Negative for arthralgias, back pain, joint swelling and myalgias.   Neurological:  Negative for weakness, numbness and headaches.       Physical Exam     Initial Vitals   BP Pulse Resp Temp SpO2   04/23/24 0918 04/23/24 0918 04/23/24 0918 04/23/24 0919 04/23/24 0918   (!) 147/98 82 19 98 °F (36.7 °C) 98 %      MAP       --                Physical Exam    Nursing note and vitals reviewed.  Constitutional: She appears well-developed and well-nourished. No distress.   HENT:   Head: Normocephalic and atraumatic.   Eyes: Conjunctivae are normal. Pupils are equal, round, and reactive to light.   Neck: Neck supple. No thyromegaly present.   Normal range of motion.  Cardiovascular:  Normal rate, regular rhythm and normal heart sounds.           Pulmonary/Chest: Breath sounds normal. She has no wheezes. She has no rhonchi. She has no rales.   Abdominal: Abdomen is soft.   Musculoskeletal:      Cervical back: Normal range of motion and neck supple.      Comments: Decreased range motion.  Patient has tenderness over the right posterior cervical strap muscle.  No tenderness over the anterior neck.  No carotid bruit.  No overlying erythema.  She is able to rotate her head more towards the left than to the right.  She does not want to tilt her head to the right.  Does not really want to flex her neck.     Lymphadenopathy:     She has no cervical adenopathy.   Neurological: She is alert and  Lack of Transportation (Medical):  Lack of Transportation (Non-Medical):    Physical Activity:     Days of Exercise per Week:     Minutes of Exercise per Session:    Stress:     Feeling of Stress :    Social Connections:     Frequency of Communication with Friends and Family:     Frequency of Social Gatherings with Friends and Family:     Attends [employer] Services:     Active Member of Clubs or Organizations:     Attends Club or Organization Meetings:     Marital Status:    Intimate Partner Violence:     Fear of Current or Ex-Partner:     Emotionally Abused:     Physically Abused:     Sexually Abused:        History reviewed. No pertinent family history. SUBJECTIVE: Nia Sloan presents to the office today for consult for pre-op evaluation prior to carotid endarterectomy surgery with Dr. Star Pedersen  He complains of no new or unstable cardiac symptoms,  and denies chest pain, chest pressure/discomfort, claudication, dyspnea, exertional chest pressure/discomfort, fatigue, irregular heart beat, lower extremity edema, near-syncope, orthopnea, palpitations, paroxysmal nocturnal dyspnea, syncope and tachypnea. No hx of MI, CHF, CVA. Had a pharmacologic stress last year - he knows not why - I reviewed images  Works manufacturing and repairing tools, does other chores around the house. Review of Systems:   Heart: as above   Lungs: as above   Eyes: denies changes in vision or discharge. Ears: denies changes in hearing or pain. Nose: denies epistaxis or masses   Throat: denies sore throat or trouble swallowing. Neuro: denies numbness, tingling, tremors. Skin: denies rashes or itching. : denies hematuria, dysuria   GI: denies vomiting, diarrhea   Psych: denies mood changed, anxiety, depression. all others negative.     Physical Exam   BP (!) 164/76   Pulse 78   Resp 18   Ht 5' 7\" (1.702 m)   Wt 167 lb (75.8 kg)   BMI 26.16 kg/m²   Constitutional: Oriented to person, oriented to person, place, and time. She has normal strength. No sensory deficit. GCS score is 15. GCS eye subscore is 4. GCS verbal subscore is 5. GCS motor subscore is 6.         ED Course   Procedures  Labs Reviewed   CBC W/ AUTO DIFFERENTIAL - Abnormal; Notable for the following components:       Result Value    MCH 33.1 (*)     MPV 8.6 (*)     All other components within normal limits   BASIC METABOLIC PANEL - Abnormal; Notable for the following components:    Potassium 3.3 (*)     CO2 30 (*)     Glucose 112 (*)     All other components within normal limits   SEDIMENTATION RATE - Abnormal; Notable for the following components:    Sed Rate 26 (*)     All other components within normal limits   C-REACTIVE PROTEIN          Imaging Results              X-Ray Cervical Spine AP And Lateral (Final result)  Result time 04/23/24 11:16:06      Final result by Vijay Garcia MD (04/23/24 11:16:06)                   Impression:      No radiographically evident acute cervical spine abnormality.  Multilevel degenerative changes of the cervical spine, most pronounced at C5-6.      Electronically signed by: Vijay Garcia  Date:    04/23/2024  Time:    11:16               Narrative:    EXAMINATION:  XR CERVICAL SPINE AP LATERAL    CLINICAL HISTORY:  Cervicalgia    TECHNIQUE:  AP, lateral and open mouth views of the cervical spine were performed.    COMPARISON:  None.    FINDINGS:  The vertebral bodies maintain normal height and alignment.  There is no fracture or subluxation.  Moderate intervertebral disc space narrowing with degenerative endplate change at C5-6. Multilevel facet arthropathy.  The odontoid process appears intact.  The prevertebral soft tissues are normal.  The airway is patent.                                       Medications   ketorolac injection 30 mg (30 mg Intramuscular Given 4/23/24 1005)   orphenadrine injection 60 mg (60 mg Intramuscular Given 4/23/24 1005)   potassium chloride SA CR tablet 40 mEq (40 mEq  place, and time. No distress. Head: Normocephalic and atraumatic. Eyes: EOM are normal. Pupils are equal, round, and reactive to light. Neck: Normal range of motion. Neck supple. No hepatojugular reflux and no JVD present. LEFT Carotid bruit is  present. Cardiovascular: Normal rate, regular rhythm, normal heart sounds and intact distal pulses. Exam reveals no gallop and no friction rub. No murmur heard. Pulmonary/Chest: Effort normal and breath sounds normal. No respiratory distress. No wheezes. No rales. Abdominal: Soft. Bowel sounds are normal. No distension and no mass. No tenderness. No rebound and no guarding. Musculoskeletal: Normal range of motion. No edema and no tenderness. Neurological: Alert and oriented to person, place, and time. Skin: Skin is warm and dry. No rash noted. Not diaphoretic. No erythema. Psychiatric: Normal mood and affect. Behavior is normal.     EKG:  normal sinus rhythm, occasional PVC noted, unifocal, normal .    ASSESSMENT AND PLAN:  Patient Active Problem List   Diagnosis    PVD (peripheral vascular disease) with claudication (HCC)    Tobacco dependence    Bilateral carotid bruits    Bilateral carotid artery stenosis    Intracranial carotid stenosis, bilateral       ·  Patient has no high risk clinical predictors of an adverse outcome in surgery, such as recent myocardial infarction, unstable angina, heart failure, rhythm other than sinus, severe obstructive valvular disease,cva, insulin, ckd  · Able to achieve 4 METS with AODLs  · Normal CV exam and ekg  · Low risk pharmacologic stress 1/9/2020 - limited area of potential ischemia with normal EF  · Stop smoking - counseled  · Titrate up rosuvastatin to achieve LDL < 70  · RCRI Class I risk (< 1% chance of cardiac complication).        Joannie Olszewski, M.D  [facility] Oral Given 4/23/24 1115)     Medical Decision Making  Patient has neck pain that she awoke with 2 days ago.  I will treat with Toradol and Norflex.  She does not have a history of prior IV drug use but denies any recent drug use within the past 12 years.  She denies any fevers.  I doubt this is an infectious process but more likely cervicalgia from bulging disc muscle spasm pinched nerve.  She has some tingling in the right fingertips.    The patient appears to have a cervical strain.  I do not think the patient has a fracture, subluxation, focal unstable neurologic deficit, infectious process or other severe injury requiring further workup in the ED at this time.  The imaging shows no acute injury. The patient will be treated with anti-inflammatories, and muscle relaxers.    She may have a cervical radiculopathy.  I have referred her back to primary care and given her very specific return precautions.     Amount and/or Complexity of Data Reviewed  Labs: ordered.  Radiology: ordered.    Risk  Prescription drug management.               ED Course as of 04/23/24 1830   Tue Apr 23, 2024   1112 Patient appears to have some arthritis of the cervical spine but no findings of acute fracture dislocation or subluxation.  She is normal white count and CRP.  I doubt this is infectious in nature.  I believe she is stable for discharge and can take Toradol and Flexeril.  Return precautions have been given.  She has no neurologic deficits and I do not think she needs emergent imaging at this time. [JS]      ED Course User Index  [JS] Ag Pyle MD                           Clinical Impression:  Final diagnoses:  [M54.2] Neck pain  [M62.838] Muscle spasms of neck (Primary)  [S16.1XXA] Strain of neck muscle, initial encounter  [E87.6] Hypokalemia          ED Disposition Condition    Discharge Stable          ED Prescriptions       Medication Sig Dispense Start Date End Date Auth. Provider    ketorolac (TORADOL) 10 mg tablet  Take 1 tablet (10 mg total) by mouth every 8 (eight) hours as needed. 12 tablet 4/23/2024 -- Ag Pyle MD    cyclobenzaprine (FLEXERIL) 10 MG tablet Take 1 tablet (10 mg total) by mouth 3 (three) times daily as needed. 15 tablet 4/23/2024 4/28/2024 Ag Pyle MD          Follow-up Information       Follow up With Specialties Details Why Contact Info    Katerin Urbano FNP-C Family Medicine Schedule an appointment as soon as possible for a visit   [address] [facility] [address]  [phone]               Ag Pyle MD  04/23/24 1657

## 2024-04-23 NOTE — ED NOTES
Assumed care:  Carmen Landon is awake, alert and oriented x 3, skin warm and dry, in NAD.  Patient CO neck pain that started yesterday, denies injury.  States she woke up yesterday and was unable to  her neck and has throbbing pain to right side of neck.  States that she does have tingling in bilateral fingertips but states that this is not new.    Patient identifiers for Carmen Landon checked and correct.  LOC:  Carmen Landon is awake, alert, and aware of environment with an appropriate affect. She is oriented x 3 and speaking appropriately.  APPEARANCE:  She is resting comfortably and in no acute distress. She is clean and well groomed, patient's clothing is properly fastened.  SKIN:  The skin is warm and dry. She has normal skin turgor and moist mucus membranes. Skin is intact; no bruising or breakdown noted.  MUSCULOSKELETAL:  She is moving all extremities well, no obvious deformities noted. Pulses intact.  Neck pain  RESPIRATORY:  Airway is open and patent. Respirations are spontaneous and non-labored with normal effort and rate.  CARDIAC:  She has a normal rate and rhythm. No peripheral edema noted. Capillary refill < 3 seconds.  ABDOMEN:  No distention noted.  Soft and non-tender upon palpation.  NEUROLOGICAL:  PERRL. Facial expression is symmetrical. Hand grasps are equal bilaterally. Normal sensation in all extremities when touched with finger except to bilateral fingertips.  HA  Allergies reported:  Review of patient's allergies indicates:  No Known Allergies

## 2024-05-07 ENCOUNTER — TELEPHONE (OUTPATIENT)
Dept: HEPATOLOGY | Facility: CLINIC | Age: 49
End: 2024-05-07

## 2024-05-14 ENCOUNTER — HOSPITAL ENCOUNTER (OUTPATIENT)
Dept: RADIOLOGY | Facility: HOSPITAL | Age: 49
Discharge: HOME | End: 2024-05-14
Attending: PHYSICIAN ASSISTANT
Payer: MEDICAID

## 2024-05-14 ENCOUNTER — TELEPHONE (OUTPATIENT)
Dept: HEPATOLOGY | Facility: CLINIC | Age: 49
End: 2024-05-14

## 2024-05-14 ENCOUNTER — TELEPHONE (OUTPATIENT)
Dept: FAMILY MEDICINE | Facility: CLINIC | Age: 49
End: 2024-05-14

## 2024-05-14 DIAGNOSIS — E87.6 HYPOKALEMIA: Primary | ICD-10-CM

## 2024-05-14 DIAGNOSIS — K74.60 HEPATIC CIRRHOSIS, UNSPECIFIED HEPATIC CIRRHOSIS TYPE, UNSPECIFIED WHETHER ASCITES PRESENT: Primary | ICD-10-CM

## 2024-05-14 DIAGNOSIS — K74.60 HEPATIC CIRRHOSIS, UNSPECIFIED HEPATIC CIRRHOSIS TYPE, UNSPECIFIED WHETHER ASCITES PRESENT: ICD-10-CM

## 2024-05-14 PROCEDURE — 76705 ECHO EXAM OF ABDOMEN: CPT | Mod: TC

## 2024-05-14 PROCEDURE — 76705 ECHO EXAM OF ABDOMEN: CPT | Mod: 26,,, | Performed by: RADIOLOGY

## 2024-05-14 RX ORDER — POTASSIUM CHLORIDE 20 MEQ/1
20 TABLET, EXTENDED RELEASE ORAL DAILY
Qty: 20 TABLET | Refills: 0 | Status: SHIPPED | OUTPATIENT
Start: 2024-05-14

## 2024-05-14 NOTE — TELEPHONE ENCOUNTER
I spoke with patient and msg from PA Scheuermann relayed.  I stressed that she  K+ Rx.  BMP scheduled 5/24/24; reminder notice mailed.

## 2024-05-14 NOTE — TELEPHONE ENCOUNTER
Spoke with pt., states she seen psych last week and they asked her to ask if you  would order a prolactin level with her labs you are ordering.

## 2024-05-14 NOTE — TELEPHONE ENCOUNTER
Labs today w/ low K+ 3.0  (Has been low several times in past)    Pls call pt and have her begin KCl 20 mEq daily.  Rx sent to pharmacy in [facility]  Schedule BMP in 10 days    thanks

## 2024-05-15 ENCOUNTER — TELEPHONE (OUTPATIENT)
Dept: HEPATOLOGY | Facility: CLINIC | Age: 49
End: 2024-05-15

## 2024-05-15 DIAGNOSIS — K74.60 HEPATIC CIRRHOSIS, UNSPECIFIED HEPATIC CIRRHOSIS TYPE, UNSPECIFIED WHETHER ASCITES PRESENT: Primary | ICD-10-CM

## 2024-05-16 NOTE — TELEPHONE ENCOUNTER
5/14 labs and ultrasound reviewed - stable  (Low K already addressed)  Peth pending      Please schedule: CBC, CMP, INR, AFP, Peth, U/S, VISIT in 11/2024    Notified via [facility]    Thanks

## 2024-05-21 ENCOUNTER — OFFICE VISIT (OUTPATIENT)
Dept: FAMILY MEDICINE | Facility: CLINIC | Age: 49
End: 2024-05-21
Payer: MEDICAID

## 2024-05-21 ENCOUNTER — HOSPITAL ENCOUNTER (EMERGENCY)
Facility: HOSPITAL | Age: 49
Discharge: HOME | End: 2024-05-21
Attending: EMERGENCY MEDICINE
Payer: MEDICAID

## 2024-05-21 VITALS
HEIGHT: 62 IN | DIASTOLIC BLOOD PRESSURE: 88 MMHG | OXYGEN SATURATION: 97 % | WEIGHT: 137.31 LBS | HEART RATE: 82 BPM | BODY MASS INDEX: 25.27 KG/M2 | SYSTOLIC BLOOD PRESSURE: 125 MMHG

## 2024-05-21 VITALS
OXYGEN SATURATION: 99 % | RESPIRATION RATE: 18 BRPM | DIASTOLIC BLOOD PRESSURE: 83 MMHG | HEIGHT: 62 IN | HEART RATE: 80 BPM | SYSTOLIC BLOOD PRESSURE: 127 MMHG | BODY MASS INDEX: 25.21 KG/M2 | TEMPERATURE: 98 F | WEIGHT: 137 LBS

## 2024-05-21 DIAGNOSIS — R51.9 ACUTE NONINTRACTABLE HEADACHE, UNSPECIFIED HEADACHE TYPE: ICD-10-CM

## 2024-05-21 DIAGNOSIS — M54.2 NECK PAIN: ICD-10-CM

## 2024-05-21 DIAGNOSIS — F17.200 CURRENT SMOKER: ICD-10-CM

## 2024-05-21 DIAGNOSIS — H65.03 BILATERAL ACUTE SEROUS OTITIS MEDIA, RECURRENCE NOT SPECIFIED: Primary | ICD-10-CM

## 2024-05-21 DIAGNOSIS — M43.6 NECK RIGIDITY: ICD-10-CM

## 2024-05-21 DIAGNOSIS — S16.1XXA STRAIN OF NECK MUSCLE, INITIAL ENCOUNTER: Primary | ICD-10-CM

## 2024-05-21 PROCEDURE — 94760 N-INVAS EAR/PLS OXIMETRY 1: CPT

## 2024-05-21 PROCEDURE — 63600175 PHARM REV CODE 636 W HCPCS: Performed by: EMERGENCY MEDICINE

## 2024-05-21 PROCEDURE — 99999 PR PBB SHADOW E&M-EST. PATIENT-LVL IV: CPT | Mod: PBBFAC,,, | Performed by: NURSE PRACTITIONER

## 2024-05-21 PROCEDURE — 99284 EMERGENCY DEPT VISIT MOD MDM: CPT | Mod: 25

## 2024-05-21 PROCEDURE — 99214 OFFICE O/P EST MOD 30 MIN: CPT | Mod: PBBFAC,PN | Performed by: NURSE PRACTITIONER

## 2024-05-21 PROCEDURE — 99214 OFFICE O/P EST MOD 30 MIN: CPT | Mod: S$PBB,,, | Performed by: NURSE PRACTITIONER

## 2024-05-21 PROCEDURE — 96375 TX/PRO/DX INJ NEW DRUG ADDON: CPT

## 2024-05-21 PROCEDURE — 25000003 PHARM REV CODE 250: Performed by: EMERGENCY MEDICINE

## 2024-05-21 PROCEDURE — 96374 THER/PROPH/DIAG INJ IV PUSH: CPT

## 2024-05-21 RX ORDER — HYDROMORPHONE HYDROCHLORIDE 1 MG/ML
1 INJECTION, SOLUTION INTRAMUSCULAR; INTRAVENOUS; SUBCUTANEOUS
Status: COMPLETED | OUTPATIENT
Start: 2024-05-21 | End: 2024-05-21

## 2024-05-21 RX ORDER — CYCLOBENZAPRINE HCL 10 MG
10 TABLET ORAL
Status: COMPLETED | OUTPATIENT
Start: 2024-05-21 | End: 2024-05-21

## 2024-05-21 RX ORDER — MIRTAZAPINE 15 MG/1
15 TABLET, FILM COATED ORAL NIGHTLY
COMMUNITY
Start: 2024-05-14

## 2024-05-21 RX ORDER — CYCLOBENZAPRINE HCL 10 MG
10 TABLET ORAL 3 TIMES DAILY PRN
Qty: 15 TABLET | Refills: 0 | Status: SHIPPED | OUTPATIENT
Start: 2024-05-21 | End: 2024-05-26

## 2024-05-21 RX ORDER — ESCITALOPRAM OXALATE 20 MG/1
20 TABLET ORAL
COMMUNITY
Start: 2024-05-14

## 2024-05-21 RX ORDER — KETOROLAC TROMETHAMINE 10 MG/1
10 TABLET, FILM COATED ORAL EVERY 6 HOURS PRN
Qty: 15 TABLET | Refills: 0 | Status: SHIPPED | OUTPATIENT
Start: 2024-05-21 | End: 2024-05-26

## 2024-05-21 RX ORDER — FLUTICASONE PROPIONATE 50 MCG
1 SPRAY, SUSPENSION (ML) NASAL 2 TIMES DAILY
Qty: 16 G | Refills: 1 | Status: SHIPPED | OUTPATIENT
Start: 2024-05-21

## 2024-05-21 RX ORDER — ONDANSETRON HYDROCHLORIDE 2 MG/ML
4 INJECTION, SOLUTION INTRAVENOUS
Status: COMPLETED | OUTPATIENT
Start: 2024-05-21 | End: 2024-05-21

## 2024-05-21 RX ADMIN — CYCLOBENZAPRINE 10 MG: 10 TABLET, FILM COATED ORAL at 04:05

## 2024-05-21 RX ADMIN — ONDANSETRON 4 MG: 2 INJECTION INTRAMUSCULAR; INTRAVENOUS at 04:05

## 2024-05-21 RX ADMIN — HYDROMORPHONE HYDROCHLORIDE 1 MG: 1 INJECTION, SOLUTION INTRAMUSCULAR; INTRAVENOUS; SUBCUTANEOUS at 04:05

## 2024-05-21 NOTE — PROGRESS NOTES
Subjective:       Patient ID: Carmen Landon is a 49 y.o. female.    Chief Complaint: Headache, Nausea (2 days), and Neck Pain (Stiffness for 2 days)    Patient presents today with acute onset severe neck pain and rigidity that as occurred 2 days ago and is progressively worsening.  Pain started on the right side of the neck and shoulder.  She has no history of an injury to the area of pain.  The pain has progressed over the last 2 days to the neck with severe limitation of range of motion laterally as well as with upward and downward motions.  She states that anything that touches the neck is severely painful.  Pain extends to the shoulders bilaterally.  She does also complain of a headache that has occurred and is persistent.  She states that she is taken Tylenol which has not been helpful for her head.  She states that the headache is an 8/10 on a 0-10 pain scale with 10 being the worst pain.  She denies nausea, vomiting, dizziness, vision changes, or syncope.  Patient is slightly overweight with a BMI 25.11    Neck Pain   This is a new problem. The current episode started in the past 7 days. The problem occurs constantly. The problem has been rapidly worsening. The pain is associated with nothing. The pain is present in the right side, left side and midline. The quality of the pain is described as aching and stabbing. The pain is at a severity of 8/10. The pain is severe. The symptoms are aggravated by bending and position. The pain is Same all the time. Associated symptoms include headaches. Pertinent negatives include no chest pain, fever, leg pain, numbness, pain with swallowing, paresis, photophobia, syncope, tingling, trouble swallowing, visual change, weakness or weight loss. She has tried acetaminophen and muscle relaxants for the symptoms. The treatment provided mild relief.     Review of Systems   Constitutional:  Positive for activity change. Negative for appetite change, fever and weight  loss.   HENT:  Negative for congestion, ear discharge, ear pain, sore throat, trouble swallowing and voice change.    Eyes:  Negative for photophobia, pain, discharge and visual disturbance.   Respiratory:  Negative for cough, chest tightness and shortness of breath.    Cardiovascular:  Negative for chest pain, palpitations and syncope.   Gastrointestinal:  Negative for abdominal pain, nausea and vomiting.   Endocrine: Negative for cold intolerance and heat intolerance.   Genitourinary:  Negative for difficulty urinating and dysuria.   Musculoskeletal:  Positive for myalgias and neck pain. Negative for arthralgias and gait problem.   Skin:  Negative for rash.   Allergic/Immunologic: Negative for immunocompromised state.   Neurological:  Positive for headaches. Negative for tingling, seizures, facial asymmetry, speech difficulty, weakness and numbness.   Psychiatric/Behavioral:  Negative for confusion, self-injury and suicidal ideas.        Past Medical History:   Diagnosis Date    Addiction to drug     Alcohol abuse     Allergy     Anemia     Cirrhosis     Hepatitis C virus infection cured after antiviral drug therapy     s/p 24 wks epclusa - treated / cured. (SVR12 - 11/2021)    History of psychiatric hospitalization     alcohol related    Hx of psychiatric care     Meningitis     Psychiatric problem       Past Surgical History:   Procedure Laterality Date    ESOPHAGOGASTRODUODENOSCOPY N/A 5/10/2021    Procedure: EGD (ESOPHAGOGASTRODUODENOSCOPY);  Surgeon: Juan Manuel Sahni MD;  Location: [facility];  Service: Endoscopy;  Laterality: N/A;    ESOPHAGOGASTRODUODENOSCOPY N/A 7/3/2023    Procedure: EGD (ESOPHAGOGASTRODUODENOSCOPY);  Surgeon: Juan Manuel Sahni MD;  Location: [facility];  Service: Endoscopy;  Laterality: N/A;    TUBAL LIGATION         Family History   Problem Relation Name Age of Onset    Alcohol abuse Mother      Kidney disease Mother      Heart disease Father         Social History     Socioeconomic  History    Marital status:     Number of children: 2   Tobacco Use    Smoking status: Former     Current packs/day: 0.00     Types: Cigarettes     Quit date: 2023     Years since quittin.0    Smokeless tobacco: Current   Substance and Sexual Activity    Alcohol use: Not Currently     Comment: 1 pint of Whiskey per day.     Drug use: Yes     Types: Marijuana     Comment: history of heroin and cocaine    Sexual activity: Not Currently     Birth control/protection: See Surgical Hx     Social Determinants of Health     Financial Resource Strain: High Risk (2023)    Overall Financial Resource Strain (CARDIA)     Difficulty of Paying Living Expenses: Very hard   Food Insecurity: Food Insecurity Present (2023)    Hunger Vital Sign     Worried About Running Out of Food in the Last Year: Often true     Ran Out of Food in the Last Year: Often true   Transportation Needs: Unmet Transportation Needs (2023)    PRAPARE - Transportation     Lack of Transportation (Medical): Yes     Lack of Transportation (Non-Medical): No   Physical Activity: Sufficiently Active (2023)    Exercise Vital Sign     Days of Exercise per Week: 6 days     Minutes of Exercise per Session: 60 min   Stress: No Stress Concern Present (2023)    [employer] - Occupational Stress Questionnaire     Feeling of Stress : Only a little   Housing Stability: High Risk (2023)    Housing Stability Vital Sign     Unable to Pay for Housing in the Last Year: Yes     Number of Places Lived in the Last Year: 1     Unstable Housing in the Last Year: No       Current Outpatient Medications   Medication Sig Dispense Refill    ARIPiprazole (ABILIFY) 10 MG Tab Take 1 tablet (10 mg total) by mouth once daily. 30 tablet 5    EScitalopram oxalate (LEXAPRO) 20 MG tablet Take 20 mg by mouth.      FOLIC ACID ORAL Take by mouth.      ketorolac (TORADOL) 10 mg tablet Take 1 tablet (10 mg total) by mouth every 8  "(eight) hours as needed. 12 tablet 0    lactulose (CHRONULAC) 20 gram/30 mL Soln Take 30mL 2-3 times daily with a goal of 3-4 BM's daily 2700 mL 5    mirtazapine (REMERON) 15 MG tablet Take 15 mg by mouth every evening.      multivitamin Tab Take 1 tablet by mouth once daily. 90 tablet 1    naltrexone (DEPADE) 50 mg tablet Take 50 mg by mouth 2 (two) times a day. 60 tablet 5    potassium chloride SA (K-DUR,KLOR-CON) 20 MEQ tablet Take 1 tablet (20 mEq total) by mouth once daily. 20 tablet 0    trazodone HCl (TRAZODONE ORAL) Take by mouth.      fluticasone propionate (FLONASE) 50 mcg/actuation nasal spray 1 spray (50 mcg total) by Each Nostril route 2 (two) times a day. 16 g 1     No current facility-administered medications for this visit.       Review of patient's allergies indicates:  No Known Allergies  Objective:    HPI     Nausea     Additional comments: 2 days           Neck Pain     Additional comments: Stiffness for 2 days          Last edited by Sarah Del Cid LPN on 5/21/2024  1:59 PM.      Blood pressure 125/88, pulse 82, height 5' 2" (1.575 m), weight 62.3 kg (137 lb 4.8 oz), last menstrual period 10/08/2019, SpO2 97%. Body mass index is 25.11 kg/m².   Physical Exam  Vitals and nursing note reviewed.   Constitutional:       General: She is not in acute distress.     Appearance: Normal appearance. She is well-developed.   HENT:      Head: Normocephalic and atraumatic.      Right Ear: External ear normal. A middle ear effusion is present. Tympanic membrane is not erythematous or bulging.      Left Ear: External ear normal. A middle ear effusion is present. Tympanic membrane is not erythematous or bulging.      Nose: Nose normal.      Mouth/Throat:      Mouth: Mucous membranes are moist.   Eyes:      General: Lids are normal. Lids are everted, no foreign bodies appreciated.      Conjunctiva/sclera: Conjunctivae normal.      Pupils: Pupils are equal, round, and reactive to light.      Right eye: Pupil is " round and reactive.      Left eye: Pupil is round and reactive.   Neck:      Trachea: Trachea normal.     Cardiovascular:      Rate and Rhythm: Normal rate and regular rhythm.      Pulses: Normal pulses.      Heart sounds: Normal heart sounds, S1 normal and S2 normal.   Pulmonary:      Effort: Pulmonary effort is normal.      Breath sounds: Normal breath sounds.   Abdominal:      General: Abdomen is flat. Bowel sounds are normal.      Palpations: Abdomen is soft. Abdomen is not rigid.      Tenderness: There is no guarding.   Musculoskeletal:      Cervical back: Rigidity present. Pain with movement and spinous process tenderness present. No muscular tenderness. Decreased range of motion.   Lymphadenopathy:      Cervical: No cervical adenopathy.   Skin:     General: Skin is warm and dry.      Capillary Refill: Capillary refill takes less than 2 seconds.   Neurological:      General: No focal deficit present.      Mental Status: She is alert and oriented to person, place, and time.      Cranial Nerves: Cranial nerves 2-12 are intact.      Sensory: Sensation is intact.      Motor: Motor function is intact.      Coordination: Coordination is intact.      Gait: Gait is intact.   Psychiatric:         Mood and Affect: Mood is anxious.         Behavior: Behavior normal. Behavior is cooperative.         Thought Content: Thought content normal.         Judgment: Judgment normal.             Assessment:       1. Bilateral acute serous otitis media, recurrence not specified    2. Neck rigidity    3. Acute nonintractable headache, unspecified headache type    4. Neck pain    5. Current smoker        Plan:       Carmen was seen today for headache, nausea and neck pain.    Diagnoses and all orders for this visit:    Bilateral acute serous otitis media, recurrence not specified  -     fluticasone propionate (FLONASE) 50 mcg/actuation nasal spray; 1 spray (50 mcg total) by Each Nostril route 2 (two) times a day.    Neck  rigidity  Patient has been instructed to go to the hospital for immediate evaluation to rule out meningitis.  She has a history of spinal meningitis in the past she states. Denies EMS transport.    Acute nonintractable headache, unspecified headache type  Patient has been instructed to go to the hospital for immediate evaluation to rule out meningitis.  She has a history of spinal meningitis in the past she states. Denies EMS transport.    Neck pain    Current smoker  Smoking cessation advised.    Follow up with the hospital immediately.          I spent a total of 30 minutes on the day of the visit.  This includes face to face time and non-face to face time preparing to see the patient (eg, review of tests), obtaining and/or reviewing separately obtained history, documenting clinical information in the electronic or other health record, independently interpreting results and communicating results to the patient/family/caregiver, or care coordinator.

## 2024-05-21 NOTE — ED PROVIDER NOTES
Encounter Date: 5/21/2024       History     Chief Complaint   Patient presents with    Nausea    Vomiting    Headache     Patient states that she has a headache nausea vomiting, neck stiffness is unable to turn all the way to the left or right is unable to place chin to chest      49-year-old female with a history of cirrhosis presents neck pain for several days.  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  Has a mild headache.  Pain is posterior cervical along the  paraspinal cervical muscles and lateral neck muscles.  No fevers no chills no extremity numbness or weakness.  Had spinal meningitis when she was little but notes that pain was different.  Sent to the ER by primary care    The history is provided by the patient.     Review of patient's allergies indicates:  No Known Allergies  Past Medical History:   Diagnosis Date    Addiction to drug     Alcohol abuse     Allergy     Anemia     Cirrhosis     Hepatitis C virus infection cured after antiviral drug therapy     s/p 24 wks epclusa - treated / cured. (SVR12 - 11/2021)    History of psychiatric hospitalization     alcohol related    Hx of psychiatric care     Meningitis     Psychiatric problem      Past Surgical History:   Procedure Laterality Date    ESOPHAGOGASTRODUODENOSCOPY N/A 5/10/2021    Procedure: EGD (ESOPHAGOGASTRODUODENOSCOPY);  Surgeon: Juan Manuel Sahni MD;  Location: [facility];  Service: Endoscopy;  Laterality: N/A;    ESOPHAGOGASTRODUODENOSCOPY N/A 7/3/2023    Procedure: EGD (ESOPHAGOGASTRODUODENOSCOPY);  Surgeon: Juan Manuel Sahni MD;  Location: [facility];  Service: Endoscopy;  Laterality: N/A;    TUBAL LIGATION       Family History   Problem Relation Name Age of Onset    Alcohol abuse Mother      Kidney disease Mother      Heart disease Father       Social History     Tobacco Use    Smoking status: Former     Current packs/day: 0.00     Types: Cigarettes     Quit date: 5/1/2023     Years since quitting:  1.0    Smokeless tobacco: Current   Substance Use Topics    Alcohol use: Not Currently     Comment: 1 pint of Whiskey per day.     Drug use: Yes     Types: Marijuana     Comment: history of heroin and cocaine     Review of Systems   Constitutional: Negative.    Musculoskeletal:  Positive for neck pain and neck stiffness.   Neurological:  Positive for headaches.       Physical Exam     Initial Vitals [05/21/24 1524]   BP Pulse Resp Temp SpO2   (!) 156/99 77 20 98 °F (36.7 °C) 100 %      MAP       --         Physical Exam    Nursing note and vitals reviewed.  Constitutional: She appears well-developed and well-nourished. She is not diaphoretic. No distress.   HENT:   Head: Normocephalic and atraumatic.   Eyes: EOM are normal.   Neck:       Cardiovascular:  Normal rate, regular rhythm and normal heart sounds.     Exam reveals no gallop and no friction rub.       No murmur heard.  Pulmonary/Chest: Breath sounds normal. No respiratory distress. She has no wheezes. She has no rhonchi. She has no rales.   Musculoskeletal:      Cervical back: Rigidity present. Muscular tenderness present. No spinous process tenderness. Decreased range of motion.     Neurological: She is alert and oriented to person, place, and time. She has normal strength. No sensory deficit. Gait normal.     Equal  bilaterally   Skin: Skin is warm and dry.   Psychiatric: She has a normal mood and affect. Her behavior is normal. Judgment and thought content normal.         ED Course   Procedures  Labs Reviewed - No data to display       Imaging Results    None          Medications   HYDROmorphone injection 1 mg (1 mg Intravenous Given 5/21/24 1612)   ondansetron injection 4 mg (4 mg Intravenous Given 5/21/24 1613)   cyclobenzaprine tablet 10 mg (10 mg Oral Given 5/21/24 1605)     Medical Decision Making  1711 48 yo female hx cirrhosis here for several days neck pain.  Very mild headache.  No fevers or chills.  There was concern from primary care for  meningitis but clinically I have no suspicion for meningitis.  Symptoms are more consistent with a cervical strain.  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  Pain has essentially completely resolved with IV medications in the emergency room.  She voices readiness for discharge.  She will be referred to back and spine if needed.  P.o. Toradol and Flexeril at home p.rn pain ROM back to normal at DC.      Amount and/or Complexity of Data Reviewed  External Data Reviewed: notes.    Risk  Prescription drug management.  Parenteral controlled substances.               ED Course as of 05/21/24 1713   Tue May 21, 2024   1531 BP(!): 156/99 [EF]   1531 Temp: 98 °F (36.7 °C) [EF]   1531 Pulse: 77 [EF]   1531 Resp: 20 [EF]   1531 SpO2: 100 % [EF]   1549  Sent to the ER with neck pain, concern from primary care for meningitis but clinically her presentation is much more consistent with just musculoskeletal neck pain and not meningitis.  No fevers no chills no infectious symptoms.  Has a headache but very mild. [EF]   1654 Feels better [EF]   1711 48 yo female hx cirrhosis here for several days neck pain.  Very mild headache.  No fevers or chills.  There was concern from primary care for meningitis but clinically I have no suspicion for meningitis.  Symptoms are more consistent with a cervical strain.  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  Pain has essentially completely resolved with IV medications in the emergency room.  She voices readiness for discharge.  She will be referred to back and spine if needed.  P.o. Toradol and Flexeril at home p.rn pain ROM back to normal at DC.      ED Course User Index  [EF] Kj Adams MD                           Clinical Impression:  Final diagnoses:  [S16.1XXA] Strain of neck muscle, initial encounter (Primary)          ED Disposition  Condition    Discharge Stable          ED Prescriptions       Medication Sig Dispense Start Date End Date Auth. Provider    ketorolac (TORADOL) 10 mg tablet Take 1 tablet (10 mg total) by mouth every 6 (six) hours as needed for Pain. 15 tablet 5/21/2024 5/26/2024 Kj Adams MD    cyclobenzaprine (FLEXERIL) 10 MG tablet Take 1 tablet (10 mg total) by mouth 3 (three) times daily as needed for Muscle spasms. 15 tablet 5/21/2024 5/26/2024 Kj Adams MD          Follow-up Information       Follow up With Specialties Details Why Contact Info Additional Information    [facility] -  Emergency Medicine  As needed, If symptoms worsen [address] [place] [address] [address] [place] [address] 1st floor    Isra Macias MD Physical Medicine and Rehabilitation Schedule an appointment as soon as possible for a visit  As needed [address] [place] [address] [address], [address]  [place] [address]  [phone]                Kj Adams MD  05/21/24 0225

## 2024-07-23 ENCOUNTER — PATIENT MESSAGE (OUTPATIENT)
Dept: HEPATOLOGY | Facility: CLINIC | Age: 49
End: 2024-07-23

## 2024-08-02 ENCOUNTER — PATIENT MESSAGE (OUTPATIENT)
Dept: ADMINISTRATIVE | Facility: HOSPITAL | Age: 49
End: 2024-08-02

## 2024-09-15 ENCOUNTER — HOSPITAL ENCOUNTER (EMERGENCY)
Facility: HOSPITAL | Age: 49
Discharge: HOME | End: 2024-09-15
Attending: EMERGENCY MEDICINE

## 2024-09-15 VITALS
RESPIRATION RATE: 18 BRPM | HEART RATE: 64 BPM | SYSTOLIC BLOOD PRESSURE: 144 MMHG | TEMPERATURE: 98 F | DIASTOLIC BLOOD PRESSURE: 87 MMHG | BODY MASS INDEX: 24.84 KG/M2 | OXYGEN SATURATION: 99 % | HEIGHT: 62 IN | WEIGHT: 135 LBS

## 2024-09-15 DIAGNOSIS — F10.920 ACUTE ALCOHOLIC INTOXICATION WITHOUT COMPLICATION: ICD-10-CM

## 2024-09-15 DIAGNOSIS — R74.01 TRANSAMINITIS: Primary | ICD-10-CM

## 2024-09-15 DIAGNOSIS — K59.00 CONSTIPATION, UNSPECIFIED CONSTIPATION TYPE: ICD-10-CM

## 2024-09-15 DIAGNOSIS — R11.12 PROJECTILE VOMITING WITH NAUSEA: ICD-10-CM

## 2024-09-15 DIAGNOSIS — Z87.19 HISTORY OF CIRRHOSIS OF LIVER: ICD-10-CM

## 2024-09-15 DIAGNOSIS — R10.13 EPIGASTRIC PAIN: ICD-10-CM

## 2024-09-15 LAB
ALBUMIN SERPL BCP-MCNC: 4.3 G/DL (ref 3.5–5.2)
ALP SERPL-CCNC: 104 U/L (ref 55–135)
ALT SERPL W/O P-5'-P-CCNC: 56 U/L (ref 10–44)
ANION GAP SERPL CALC-SCNC: 14 MMOL/L (ref 8–16)
AST SERPL-CCNC: 78 U/L (ref 10–40)
B-HCG UR QL: NEGATIVE
BASOPHILS # BLD AUTO: 0.08 K/UL (ref 0–0.2)
BASOPHILS NFR BLD: 1 % (ref 0–1.9)
BILIRUB SERPL-MCNC: 0.4 MG/DL (ref 0.1–1)
BILIRUB UR QL STRIP: NEGATIVE
BUN SERPL-MCNC: 8 MG/DL (ref 6–30)
BUN SERPL-MCNC: 9 MG/DL (ref 6–20)
CALCIUM SERPL-MCNC: 9.6 MG/DL (ref 8.7–10.5)
CHLORIDE SERPL-SCNC: 106 MMOL/L (ref 95–110)
CHLORIDE SERPL-SCNC: 108 MMOL/L (ref 95–110)
CLARITY UR REFRACT.AUTO: CLEAR
CO2 SERPL-SCNC: 21 MMOL/L (ref 23–29)
COLOR UR AUTO: COLORLESS
CREAT SERPL-MCNC: 0.7 MG/DL (ref 0.5–1.4)
CREAT SERPL-MCNC: 0.8 MG/DL (ref 0.5–1.4)
CTP QC/QA: YES
DIFFERENTIAL METHOD BLD: ABNORMAL
EOSINOPHIL # BLD AUTO: 0.1 K/UL (ref 0–0.5)
EOSINOPHIL NFR BLD: 1.3 % (ref 0–8)
ERYTHROCYTE [DISTWIDTH] IN BLOOD BY AUTOMATED COUNT: 12.9 % (ref 11.5–14.5)
EST. GFR  (NO RACE VARIABLE): >60 ML/MIN/1.73 M^2
ETHANOL SERPL-MCNC: 186 MG/DL
GLUCOSE SERPL-MCNC: 89 MG/DL (ref 70–110)
GLUCOSE SERPL-MCNC: 90 MG/DL (ref 70–110)
GLUCOSE UR QL STRIP: NEGATIVE
HCT VFR BLD AUTO: 47.9 % (ref 37–48.5)
HCT VFR BLD CALC: 50 %PCV (ref 36–54)
HCV AB SERPL QL IA: REACTIVE
HGB BLD-MCNC: 16.3 G/DL (ref 12–16)
HGB UR QL STRIP: NEGATIVE
HIV 1+2 AB+HIV1 P24 AG SERPL QL IA: NORMAL
IMM GRANULOCYTES # BLD AUTO: 0.01 K/UL (ref 0–0.04)
IMM GRANULOCYTES NFR BLD AUTO: 0.1 % (ref 0–0.5)
KETONES UR QL STRIP: NEGATIVE
LEUKOCYTE ESTERASE UR QL STRIP: NEGATIVE
LIPASE SERPL-CCNC: 63 U/L (ref 4–60)
LYMPHOCYTES # BLD AUTO: 4.9 K/UL (ref 1–4.8)
LYMPHOCYTES NFR BLD: 58.6 % (ref 18–48)
MCH RBC QN AUTO: 32.7 PG (ref 27–31)
MCHC RBC AUTO-ENTMCNC: 34 G/DL (ref 32–36)
MCV RBC AUTO: 96 FL (ref 82–98)
MONOCYTES # BLD AUTO: 0.5 K/UL (ref 0.3–1)
MONOCYTES NFR BLD: 6.1 % (ref 4–15)
NEUTROPHILS # BLD AUTO: 2.8 K/UL (ref 1.8–7.7)
NEUTROPHILS NFR BLD: 32.9 % (ref 38–73)
NITRITE UR QL STRIP: NEGATIVE
NRBC BLD-RTO: 0 /100 WBC
PH UR STRIP: 6 [PH] (ref 5–8)
PLATELET # BLD AUTO: 277 K/UL (ref 150–450)
PMV BLD AUTO: 9.3 FL (ref 9.2–12.9)
POC IONIZED CALCIUM: 1.16 MMOL/L (ref 1.06–1.42)
POC TCO2 (MEASURED): 21 MMOL/L (ref 23–29)
POTASSIUM BLD-SCNC: 3.6 MMOL/L (ref 3.5–5.1)
POTASSIUM SERPL-SCNC: 3.7 MMOL/L (ref 3.5–5.1)
PROT SERPL-MCNC: 8.6 G/DL (ref 6–8.4)
PROT UR QL STRIP: NEGATIVE
RBC # BLD AUTO: 4.98 M/UL (ref 4–5.4)
SAMPLE: ABNORMAL
SODIUM BLD-SCNC: 144 MMOL/L (ref 136–145)
SODIUM SERPL-SCNC: 143 MMOL/L (ref 136–145)
SP GR UR STRIP: 1 (ref 1–1.03)
URN SPEC COLLECT METH UR: ABNORMAL
WBC # BLD AUTO: 8.36 K/UL (ref 3.9–12.7)

## 2024-09-15 PROCEDURE — 80047 BASIC METABLC PNL IONIZED CA: CPT

## 2024-09-15 PROCEDURE — 82077 ASSAY SPEC XCP UR&BREATH IA: CPT | Performed by: EMERGENCY MEDICINE

## 2024-09-15 PROCEDURE — 63600175 PHARM REV CODE 636 W HCPCS: Performed by: EMERGENCY MEDICINE

## 2024-09-15 PROCEDURE — 81025 URINE PREGNANCY TEST: CPT | Performed by: EMERGENCY MEDICINE

## 2024-09-15 PROCEDURE — 87389 HIV-1 AG W/HIV-1&-2 AB AG IA: CPT | Performed by: PHYSICIAN ASSISTANT

## 2024-09-15 PROCEDURE — 99285 EMERGENCY DEPT VISIT HI MDM: CPT | Mod: 25

## 2024-09-15 PROCEDURE — 87522 HEPATITIS C REVRS TRNSCRPJ: CPT | Performed by: PHYSICIAN ASSISTANT

## 2024-09-15 PROCEDURE — 81003 URINALYSIS AUTO W/O SCOPE: CPT | Mod: 59 | Performed by: EMERGENCY MEDICINE

## 2024-09-15 PROCEDURE — 25500020 PHARM REV CODE 255: Performed by: EMERGENCY MEDICINE

## 2024-09-15 PROCEDURE — 85025 COMPLETE CBC W/AUTO DIFF WBC: CPT | Performed by: EMERGENCY MEDICINE

## 2024-09-15 PROCEDURE — 96361 HYDRATE IV INFUSION ADD-ON: CPT

## 2024-09-15 PROCEDURE — 83690 ASSAY OF LIPASE: CPT | Performed by: EMERGENCY MEDICINE

## 2024-09-15 PROCEDURE — 86803 HEPATITIS C AB TEST: CPT | Performed by: PHYSICIAN ASSISTANT

## 2024-09-15 PROCEDURE — 80053 COMPREHEN METABOLIC PANEL: CPT | Performed by: EMERGENCY MEDICINE

## 2024-09-15 PROCEDURE — 96374 THER/PROPH/DIAG INJ IV PUSH: CPT

## 2024-09-15 RX ORDER — MORPHINE SULFATE 4 MG/ML
4 INJECTION, SOLUTION INTRAMUSCULAR; INTRAVENOUS
Status: COMPLETED | OUTPATIENT
Start: 2024-09-15 | End: 2024-09-15

## 2024-09-15 RX ADMIN — IOHEXOL 75 ML: 350 INJECTION, SOLUTION INTRAVENOUS at 06:09

## 2024-09-15 RX ADMIN — MORPHINE SULFATE 4 MG: 4 INJECTION INTRAVENOUS at 04:09

## 2024-09-15 RX ADMIN — SODIUM CHLORIDE, POTASSIUM CHLORIDE, SODIUM LACTATE AND CALCIUM CHLORIDE 1000 ML: 600; 310; 30; 20 INJECTION, SOLUTION INTRAVENOUS at 04:09

## 2024-09-15 NOTE — ED NOTES
I-STAT Chem-8+ Results:   Value Reference Range   Sodium 144 136-145 mmol/L   Potassium  3.6 3.5-5.1 mmol/L   Chloride 106  mmol/L   Ionized Calcium 1.16 1.06-1.42 mmol/L   CO2 (measured) 21 23-29 mmol/L   Glucose 90  mg/dL   BUN 8 6-30 mg/dL   Creatinine 0.8 0.5-1.4 mg/dL   Hematocrit 50 36-54%

## 2024-09-15 NOTE — ED TRIAGE NOTES
Pt arrives to ED reporting RUQ abdominal pain that started about 2-3 days ago. Pt has stage 4 cirrhosis .  Pt reports pain being worse when eating.  She states nausea with dry heaving. She states her last bowel movement was about a month ago. Pt denies CP and SOB

## 2024-09-15 NOTE — ED PROVIDER NOTES
"Encounter Date: 9/15/2024       History     Chief Complaint   Patient presents with    Abdominal Pain     Coming from home. RUQ abdominal pain for the past 2 weeks. Nausea and vomiting that started today. Had a syncopal episode that lasted for 30 seconds during the vomiting episode. Patient state she has not bad a bowel movement in a month. History of cirrhosis.      49-year-old woman with comorbidities of hep C,  cirrhosis, alcohol abuse, previous heroin and cocaine abuse, and behavioral disease presents to the emergency department by EMS for evaluation of nausea/ vomiting and abdominal discomfort as well as constipation.  She reports her last bowel movement was greater than 10 days ago, and describes "dark urine," without associated dysuria or fever.  Additionally, she admits to alcohol intake earlier today.    The history is provided by the patient and medical records. No  was used.     Review of patient's allergies indicates:  No Known Allergies  Past Medical History:   Diagnosis Date    Addiction to drug     Alcohol abuse     Allergy     Anemia     Cirrhosis     Hepatitis C virus infection cured after antiviral drug therapy     s/p 24 wks epclusa - treated / cured. (SVR12 - 11/2021)    History of psychiatric hospitalization     alcohol related    Hx of psychiatric care     Meningitis     Psychiatric problem      Past Surgical History:   Procedure Laterality Date    ESOPHAGOGASTRODUODENOSCOPY N/A 5/10/2021    Procedure: EGD (ESOPHAGOGASTRODUODENOSCOPY);  Surgeon: Juan Manuel Sahni MD;  Location: [facility];  Service: Endoscopy;  Laterality: N/A;    ESOPHAGOGASTRODUODENOSCOPY N/A 7/3/2023    Procedure: EGD (ESOPHAGOGASTRODUODENOSCOPY);  Surgeon: Juan Manuel Sahni MD;  Location: [facility];  Service: Endoscopy;  Laterality: N/A;    TUBAL LIGATION       Family History   Problem Relation Name Age of Onset    Alcohol abuse Mother      Kidney disease Mother      Heart disease Father       Social " History     Tobacco Use    Smoking status: Former     Current packs/day: 0.00     Types: Cigarettes     Quit date: 2023     Years since quittin.3    Smokeless tobacco: Current   Substance Use Topics    Alcohol use: Not Currently     Comment: 1 pint of Whiskey per day.     Drug use: Yes     Types: Marijuana     Comment: history of heroin and cocaine     Review of Systems    Physical Exam     Initial Vitals [09/15/24 1440]   BP Pulse Resp Temp SpO2   124/76 78 18 97.8 °F (36.6 °C) 98 %      MAP       --         Physical Exam    Vitals reviewed.  Constitutional:   49-year-old  woman, mild discomfort noted   HENT:   Head: Normocephalic and atraumatic.   Moderately desiccated mucous membranes without acute intraoral injury.  Maxillary dentures in place.   Neck: No tracheal deviation present.   Cardiovascular:  Normal rate, regular rhythm and intact distal pulses.           Pulmonary/Chest: Breath sounds normal. No stridor. No respiratory distress. She has no wheezes.   Abdominal: Abdomen is soft. She exhibits no distension.   Epigastric and right upper quadrant tenderness to palpation noted without rebound   Musculoskeletal:         General: No edema. Normal range of motion.     Neurological: She is alert and oriented to person, place, and time. GCS score is 15. GCS eye subscore is 4. GCS verbal subscore is 5. GCS motor subscore is 6.   Skin: Skin is warm and dry.   Psychiatric: She has a normal mood and affect. Thought content normal.         ED Course   Procedures  Labs Reviewed   HEPATITIS C ANTIBODY - Abnormal       Result Value    Hepatitis C Ab Reactive (*)     Narrative:     Release to patient->Immediate   CBC W/ AUTO DIFFERENTIAL - Abnormal    WBC 8.36      RBC 4.98      Hemoglobin 16.3 (*)     Hematocrit 47.9      MCV 96      MCH 32.7 (*)     MCHC 34.0      RDW 12.9      Platelets 277      MPV 9.3      Immature Granulocytes 0.1      Gran # (ANC) 2.8      Immature Grans (Abs) 0.01      Lymph #  4.9 (*)     Mono # 0.5      Eos # 0.1      Baso # 0.08      nRBC 0      Gran % 32.9 (*)     Lymph % 58.6 (*)     Mono % 6.1      Eosinophil % 1.3      Basophil % 1.0      Differential Method Automated     COMPREHENSIVE METABOLIC PANEL - Abnormal    Sodium 143      Potassium 3.7      Chloride 108      CO2 21 (*)     Glucose 89      BUN 9      Creatinine 0.7      Calcium 9.6      Total Protein 8.6 (*)     Albumin 4.3      Total Bilirubin 0.4      Alkaline Phosphatase 104      AST 78 (*)     ALT 56 (*)     eGFR >60.0      Anion Gap 14     LIPASE - Abnormal    Lipase 63 (*)    URINALYSIS, REFLEX TO URINE CULTURE - Abnormal    Specimen UA Urine, Clean Catch      Color, UA Colorless (*)     Appearance, UA Clear      pH, UA 6.0      Specific Gravity, UA 1.005      Protein, UA Negative      Glucose, UA Negative      Ketones, UA Negative      Bilirubin (UA) Negative      Occult Blood UA Negative      Nitrite, UA Negative      Leukocytes, UA Negative      Narrative:     Specimen Source->Urine   ALCOHOL,MEDICAL (ETHANOL) - Abnormal    Alcohol, Serum 186 (*)    ISTAT PROCEDURE - Abnormal    POC Glucose 90      POC BUN 8      POC Creatinine 0.8      POC Sodium 144      POC Potassium 3.6      POC Chloride 106      POC TCO2 (MEASURED) 21 (*)     POC Ionized Calcium 1.16      POC Hematocrit 50      Sample TAMI     HIV 1 / 2 ANTIBODY    HIV 1/2 Ag/Ab Non-reactive      Narrative:     Release to patient->Immediate   ALCOHOL,MEDICAL (ETHANOL)   HEPATITIS C RNA, QUANTITATIVE, PCR    HCV Quantitative Result Not Detected      HCV, Qualitative Not Detected      Narrative:     Release to patient->Immediate   POCT URINE PREGNANCY    POC Preg Test, Ur Negative       Acceptable Yes            Imaging Results              CT Abdomen Pelvis With IV Contrast NO Oral Contrast (Final result)  Result time 09/15/24 18:14:31      Final result by Jose Juan Mullins MD (09/15/24 18:14:31)                   Impression:      1. Low  attenuating lesion within the right hepatic lobe, nonspecific although measures attenuation higher than would be expected for a simple cyst.  This does not appear to be significantly changed since examination 08/22/2022.  Further evaluation on a nonemergent MRI could be performed as warranted.  2. Findings suggest hepatic steatosis, correlation with LFTs recommended.  3. Moderate stool in the colon.  4. Please see above for several additional findings.      Electronically signed by: Jose Juan Mullins MD  Date:    09/15/2024  Time:    18:14               Narrative:    EXAMINATION:  CT ABDOMEN PELVIS WITH IV CONTRAST    CLINICAL HISTORY:  Abdominal pain, acute, nonlocalized;    TECHNIQUE:  Low dose axial images, sagittal and coronal reformations were obtained from the lung bases to the pubic symphysis following the IV administration of 75 mL of Omnipaque 350 .  Oral contrast was not given.    COMPARISON:  04/04/2024    FINDINGS:  Images of the lower thorax are remarkable for mild bilateral dependent atelectasis.  There is a calcified granuloma within the right lower lobe.    The liver is hypoattenuating suggesting steatosis, correlation with LFTs recommended.  The liver is enlarged.  There is a vague low attenuating lesion within the anterior aspect of the right hepatic lobe measuring 1.6 cm, attenuation of which measures higher than would be expected for a simple cyst.  The adrenal glands are unremarkable.  There is a subcentimeter low attenuating lesion within the anterior aspect of the spleen.  The pancreas is grossly unremarkable noting mild prominence of the pancreatic duct.  The gallbladder is distended, no significant biliary dilation.  The stomach is decompressed without significant wall thickening.  There is a small hiatal hernia.  The portal vein, splenic vein, proximal SMV, celiac axis and SMA all are patent.  There are a few scattered prominent gastrohepatic lymph nodes.    The kidneys enhance symmetrically  without hydronephrosis or nephrolithiasis.  The bilateral ureters are unremarkable without calculi seen.  The urinary bladder is distended without wall thickening.  The uterus and adnexa are unremarkable.    There are a few scattered colonic diverticula without inflammation to suggest diverticulitis.  The terminal ileum and appendix are unremarkable.  The small bowel is grossly unremarkable.  There are a few scattered shotty periaortic, pericaval, and mesenteric lymph nodes.  No focal organized pelvic fluid collection.    There are degenerative changes of the spine.  No significant inguinal lymphadenopathy.                                       Medications   lactated ringers bolus 1,000 mL (0 mLs Intravenous Stopped 9/15/24 1901)   morphine injection 4 mg (4 mg Intravenous Given 9/15/24 1620)   iohexoL (OMNIPAQUE 350) injection 75 mL (75 mLs Intravenous Given 9/15/24 1804)     Medical Decision Making  Amount and/or Complexity of Data Reviewed  Labs: ordered.  Radiology: ordered.    Risk  Prescription drug management.              Attending Attestation:             Attending ED Notes:   Emergency department evaluation today does not reveal evidence of significant hematologic derangement, though there is evidence of a lymphocytosis.  Metabolic profile reveals a mildly diminished bicarbonate  as well as mild transaminitis and a mildly elevated serum lipase without additional evidence of solid organ dysfunction.  Urinalysis is notably unremarkable.  CT scan of the abdomen and pelvis does not reveal evidence of acute intra-abdominal injury at this time.  The patient reports significant improvement of her presenting symptoms with emergency department therapy, and she is tolerating a regular diet at the time of disposition.  ED findings have been discussed with the patient, and all questions have been answered to her satisfaction.  I have recommended that she avoid any further alcohol intake as it will worsen her baseline  liver dysfunction, and I have recommended that she follows up with her managing PCP next available and return to the ED as needed for emergent concerns.  She will be discharged home in improved condition.                             Clinical Impression:  Final diagnoses:  [R74.01] Transaminitis (Primary)  [R10.13] Epigastric pain  [Z87.19] History of cirrhosis of liver  [F10.920] Acute alcoholic intoxication without complication  [R11.12] Projectile vomiting with nausea  [K59.00] Constipation, unspecified constipation type          ED Disposition Condition    Discharge Stable          ED Prescriptions    None       Follow-up Information       Follow up With Specialties Details Why Contact Info    [facility] - Emergency Dept Emergency Medicine  As needed, If symptoms worsen [address]  [facility] [address] [phone]    Katerin Urbano FNP-C Family Medicine Schedule an appointment as soon as possible for a visit in 1 week  [address] [facility] [address]  [phone]               David Richard MD  09/16/24 7975

## 2024-09-16 LAB
HCV RNA SERPL QL NAA+PROBE: NOT DETECTED
HCV RNA SPEC NAA+PROBE-ACNC: NOT DETECTED IU/ML

## 2024-09-16 NOTE — DISCHARGE INSTRUCTIONS
Avoid alcohol intake as it may worsen your chronic liver disease.  Obtain MiraLax OTC and take as directed for constipation.

## 2024-10-22 ENCOUNTER — PATIENT MESSAGE (OUTPATIENT)
Dept: RESEARCH | Facility: HOSPITAL | Age: 49
End: 2024-10-22
Payer: COMMERCIAL

## 2024-11-05 ENCOUNTER — TELEPHONE (OUTPATIENT)
Dept: HEPATOLOGY | Facility: CLINIC | Age: 49
End: 2024-11-05
Payer: COMMERCIAL

## 2024-11-05 NOTE — TELEPHONE ENCOUNTER
Patient was a no-show for testing and visit with PA Scheuermann on today.  Attempt made to reach her today by phone X 2.  Unable to leave a msg (phone might not be in service).  Patient has [facility] coverage which is not accepted at main campus.  I will send a letter asking that she call to discuss insurance issue and need for referral if warranted to outside hepatology.

## 2025-01-02 ENCOUNTER — PATIENT MESSAGE (OUTPATIENT)
Dept: ADMINISTRATIVE | Facility: HOSPITAL | Age: 50
End: 2025-01-02
Payer: COMMERCIAL

## 2025-01-14 ENCOUNTER — PATIENT MESSAGE (OUTPATIENT)
Dept: ADMINISTRATIVE | Facility: HOSPITAL | Age: 50
End: 2025-01-14
Payer: COMMERCIAL

## 2025-02-14 DIAGNOSIS — K76.82 HEPATIC ENCEPHALOPATHY: ICD-10-CM

## 2025-02-14 DIAGNOSIS — K74.60 HEPATIC CIRRHOSIS, UNSPECIFIED HEPATIC CIRRHOSIS TYPE, UNSPECIFIED WHETHER ASCITES PRESENT: ICD-10-CM

## 2025-02-14 NOTE — TELEPHONE ENCOUNTER
Attempt made to reach patient.  Number listed for patient no longer in service.  Msg from PA Scheuermann mailed to her regarding future refills.

## 2025-02-14 NOTE — TELEPHONE ENCOUNTER
Received refill request for lactulose  Pt overdue for f/u   Last seen 12/2023  Last testing 5/2024  NO SHOW 11/2024    Per [facility] msg dated 1/2/25 insurance may now be out of network    Pls try reaching pt and I'll send [facility] msg.  I can refill x 1 month only   Pt needs to arrange f/u or seek care w/ covered provider if ins not in network

## 2025-03-21 ENCOUNTER — TELEPHONE (OUTPATIENT)
Dept: HEPATOLOGY | Facility: CLINIC | Age: 50
End: 2025-03-21
Payer: MEDICAID

## 2025-03-21 DIAGNOSIS — K76.82 HEPATIC ENCEPHALOPATHY: ICD-10-CM

## 2025-03-21 DIAGNOSIS — K74.60 HEPATIC CIRRHOSIS, UNSPECIFIED HEPATIC CIRRHOSIS TYPE, UNSPECIFIED WHETHER ASCITES PRESENT: ICD-10-CM

## 2025-03-21 NOTE — TELEPHONE ENCOUNTER
Patient called back.  She reports being out of Lactulose X 2 wks and is asking that provider send Rx to pharmacy ASAP.  Preferred pharmacy verified.

## 2025-03-21 NOTE — TELEPHONE ENCOUNTER
Patient called our office.  She has new insurance.  Testing scheduled 4/2 and f/u visit with PA Scheuermann scheduled 4/4; appt reminder notices mailed.  HIV and HCV Quant added to testing since patient reports exposure to viruses since last testing.

## 2025-03-24 ENCOUNTER — PATIENT MESSAGE (OUTPATIENT)
Dept: FAMILY MEDICINE | Facility: CLINIC | Age: 50
End: 2025-03-24
Payer: MEDICAID

## 2025-04-02 ENCOUNTER — HOSPITAL ENCOUNTER (OUTPATIENT)
Dept: RADIOLOGY | Facility: HOSPITAL | Age: 50
Discharge: HOME | End: 2025-04-02
Attending: PHYSICIAN ASSISTANT
Payer: MEDICAID

## 2025-04-02 DIAGNOSIS — K74.60 HEPATIC CIRRHOSIS, UNSPECIFIED HEPATIC CIRRHOSIS TYPE, UNSPECIFIED WHETHER ASCITES PRESENT: ICD-10-CM

## 2025-04-02 PROCEDURE — 76705 ECHO EXAM OF ABDOMEN: CPT | Mod: TC

## 2025-04-02 PROCEDURE — 76705 ECHO EXAM OF ABDOMEN: CPT | Mod: 26,,, | Performed by: RADIOLOGY

## 2025-04-04 ENCOUNTER — TELEPHONE (OUTPATIENT)
Dept: HEPATOLOGY | Facility: CLINIC | Age: 50
End: 2025-04-04
Payer: MEDICAID

## 2025-04-04 ENCOUNTER — OFFICE VISIT (OUTPATIENT)
Dept: HEPATOLOGY | Facility: CLINIC | Age: 50
End: 2025-04-04
Payer: MEDICAID

## 2025-04-04 DIAGNOSIS — K74.60 HEPATIC CIRRHOSIS, UNSPECIFIED HEPATIC CIRRHOSIS TYPE, UNSPECIFIED WHETHER ASCITES PRESENT: Primary | ICD-10-CM

## 2025-04-04 DIAGNOSIS — Z86.19 HEPATITIS C VIRUS INFECTION CURED AFTER ANTIVIRAL DRUG THERAPY: ICD-10-CM

## 2025-04-04 DIAGNOSIS — K76.6 PORTAL VENOUS HYPERTENSION: ICD-10-CM

## 2025-04-04 DIAGNOSIS — F10.90 ALCOHOL USE DISORDER: ICD-10-CM

## 2025-04-04 DIAGNOSIS — K86.89 PANCREATIC DUCT DILATED: ICD-10-CM

## 2025-04-04 DIAGNOSIS — K76.82 HEPATIC ENCEPHALOPATHY: ICD-10-CM

## 2025-04-04 NOTE — TELEPHONE ENCOUNTER
----- Message from Jennifer Scheuermann, PA sent at 4/4/2025 11:21 AM CDT -----  1. Pls tell pt I see pancreas duct was prominent on CT in Sept but I think it should be evaluated more. Schedule MRI+MRCP now.  2. Pls schedule in 6 months: CBC, CMP, INR, AFP, PETH, U/S, FIBROSCAN, VISIT  thx

## 2025-04-04 NOTE — PROGRESS NOTES
"HEPATOLOGY VIDEO VISIT NOTE  Patient Location: [place]  Visit type: audiovisual    Each patient to whom he or she provides medical services by telemedicine is:  (1) informed of the relationship between the physician and patient and the respective role of any other health care provider with respect to management of the patient; and (2) notified that he or she may decline to receive medical services by telemedicine and may withdraw from such care at any time.    CHIEF COMPLAINT: HCV Cirrhosis    HISTORY       This is a 50 y.o. White female with mildly decompensated (low MELD) cirrhosis due to HCV (treated / cured) and alcohol, being seen for f/u    Nov [facility]    Cirrhosis history:  Mildly decompensated, very stable: HE (well controlled on lactulose); Prior trace ascites (2020, no SBP), Prior TBili in 2s (now norm)  (+) portal HTN: (+) PHG, plt and spleen normal  Varices: no  Variceal bleed: n/a  Variceal banding: n/a     Cirrhosis health maintenance:  - Varices screening -  EGD 7/2023 - no EV, (+) PHG  - prior resolved HBV  - lacking immunity to HAV - s/p 1 dose vaccine 6/2023    HCV history:  S/p 24 wks epclusa w/ SVR documented 1/2022 (cured)  - Treatment naive prior to epclusa  - Genotype 1a    Interval history (12/8/23):  U/S 9/2023 - no concerning liver lesions, spleen normal. No ascites  Labs 9/2023 - stable. Normal AFP.   Current liver sx:  HE - Mild, well-controlled w/ lactulose; "mixing w/ juice" to tolerate more  No ascites, jaundice, EV bleed  Doing well. Still following closely w/ mental health provider  On naltrexone but they've discussed possible change to vivitrol  No alcohol for several months    Interval history (04/04/2025):  U/S 4/2025: no liver masses or ascites. 2.2cm hepatic cyst  Labs 4/2025: stable. Normal AFP and liver panel. Peth pending. HIV, HCV neg    MELD 3.0: 8 at 4/2/2025 10:46 AM  MELD-Na: 7 at 4/2/2025 10:46 AM  Calculated from:  Serum Creatinine: 0.8 mg/dL (Using min of 1 mg/dL) at " "4/2/2025 10:46 AM  Serum Sodium: 142 mmol/L (Using max of 137 mmol/L) at 4/2/2025 10:46 AM  Total Bilirubin: 0.6 mg/dL (Using min of 1 mg/dL) at 4/2/2025 10:46 AM  Serum Albumin: 4.3 g/dL (Using max of 3.5 g/dL) at 4/2/2025 10:46 AM  INR(ratio): 1.1 at 4/2/2025 10:46 AM  Age at listing (hypothetical): 50 years  Sex: Female at 4/2/2025 10:46 AM    Of note u/s mentions redemonstration of dilated pancreatic duct at 5mm compared to CT 9/2024 (done in ER for abd pain, n/v w/ associated alcohol use and mildly elevated AST/ALT and lipase)   Does not appear this finding has been further evaluated.    Current liver sx:  HE - Mild, occasional "brain fog"  On lactulose BID w/ 3 small BM daily  No ascites, jaundice, EV bleed    "Off alcohol." No longer on naltrexone. Doing well.  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  NEURO/PSYCH: Memory intact. Thought and speech pattern appropriate. Behavior normal. No depression or anxiety noted.      PERTINENT DIAGNOSTIC RESULTS      Lab Results   Component Value Date    WBC 6.09 04/02/2025    HGB 13.2 04/02/2025     04/02/2025     Lab Results   Component Value Date    INR 1.1 04/02/2025     Lab Results   Component Value Date    AST 24 04/02/2025    ALT 17 04/02/2025    BILITOT 0.6 04/02/2025    ALBUMIN 4.3 04/02/2025    ALKPHOS 81 04/02/2025    CREATININE 0.8 04/02/2025    BUN 14 04/02/2025     04/02/2025    K 4.7 04/02/2025    AFP 3.1 04/02/2025     Results for orders placed during the hospital encounter of 04/02/25  US " Abdomen Limited  CLINICAL HISTORY:Unspecified cirrhosis of liver  TECHNIQUE:Limited ultrasound of the right upper quadrant of the abdomen (including pancreas, liver, gallbladder, common bile duct, and spleen) was performed.  COMPARISON:CT abdomen and pelvis 09/15/2024    FINDINGS:  Liver: Normal in size, measuring 15.5 cm. Coarsened hepatic echotexture with subtle nodularity of the liver surface suggestive of hepatic cirrhosis.  There is a cyst within the left hepatic lobe measuring 2.2 x 1.9 x 1.1 cm.  Gallbladder: No calculi, wall thickening, or pericholecystic fluid.  No sonographic Reyes's sign.  Biliary system: The common duct is not dilated, measuring 4 mm.  No intrahepatic ductal dilatation.  Spleen: Normal in size and echotexture, measuring 11.2 cm.  Miscellaneous: No ascites.  Redemonstration of pancreatic ductal dilatation with maximum diameter of 5 mm.    Impression  Findings consistent with hepatic cirrhosis.  No sonographic findings to suggest portal hypertension.  Chronically dilated pancreatic duct measuring up to 5 mm.        ASSESSMENT        50 y.o. White female with:  1. Cirrhosis: mildly decompensated, stable  -- MELD score 8  -- HCC screening: up to date 4/2025  -- HE: on lactulose w/ intermittent brain fog     2. Portal HTN  -- Varices screening: EGD 7/2023 - no EV, (+) PHG    3. History of HCV: cured  -- s/p 24 wks Epclusa w/ SVR 1/2022    4. Alcohol use d/o  -- reports no alcohol, peth pending  -- off naltrexone    5. Dilated pancreatic duct on u/s    PLAN      1. Increase lactulose to TID (or 1.5 dose BID)  2. MRI + MRCP to evaluate pancreatic duct  3. FibroScan to assess kPa and need for EGD vs consideration of BBlocker  4. CBC, CMP, INR, AFP, Peth, U/S, VISIT every 6 months: due 10/2025.   5. Encouraged continued alcohol abstinence      __________________________________________________________________    Duration of encounter: 47 min  This includes face-to-face time and non face-to-face  time preparing to see the patient (eg, review of tests), obtaining and/or reviewing separately obtained history, documenting clinical information in the electronic or other health record, independently interpreting resultsand communicating results to the patient/family/caregiver, or care coordination.

## 2025-04-04 NOTE — TELEPHONE ENCOUNTER
I spoke with patient and msg from PA Scheuermann relayed.  MRI scheduled 4/19, hcc testing scheduled 10/1 and f/u visit with fibroscan scheduled 10/3; appt reminder notices mailed.

## 2025-04-04 NOTE — Clinical Note
1. Pls tell pt I see pancreas duct was prominent on CT in Sept but I think it should be evaluated more. Schedule MRI+MRCP now. 2. Pls schedule in 6 months: CBC, CMP, INR, AFP, PETH, U/S, FIBROSCAN, VISIT thx

## 2025-04-19 ENCOUNTER — HOSPITAL ENCOUNTER (OUTPATIENT)
Dept: RADIOLOGY | Facility: HOSPITAL | Age: 50
Discharge: HOME | End: 2025-04-19
Attending: PHYSICIAN ASSISTANT
Payer: MEDICAID

## 2025-04-19 DIAGNOSIS — K74.60 HEPATIC CIRRHOSIS, UNSPECIFIED HEPATIC CIRRHOSIS TYPE, UNSPECIFIED WHETHER ASCITES PRESENT: ICD-10-CM

## 2025-04-19 DIAGNOSIS — K86.89 PANCREATIC DUCT DILATED: ICD-10-CM

## 2025-04-22 ENCOUNTER — TELEPHONE (OUTPATIENT)
Dept: HEPATOLOGY | Facility: CLINIC | Age: 50
End: 2025-04-22
Payer: MEDICAID

## 2025-04-22 NOTE — TELEPHONE ENCOUNTER
I spoke with patient.  She reports going for MRI but panicking and not being able to complete test.  She asked to be scheduled again for MRI.  Test scheduled 5/5/25 and reminder notice mailed.  Patient is asking that something be sent to the pharmacy to help her relax before test.  Preferred pharmacy verified.

## 2025-04-22 NOTE — TELEPHONE ENCOUNTER
"----- Message from Floyd sent at 4/22/2025  9:34 AM CDT -----  Regarding: call back  Consult/Advisory:  Name Of Caller: Self Contact Preference?:[phone] What is the nature of the call?: Calling to speak w/  Janette in regards to her MRI requesting call back  Additional Notes:"Thank you for all that you do for our patients"  "

## 2025-04-28 ENCOUNTER — PATIENT MESSAGE (OUTPATIENT)
Dept: ADMINISTRATIVE | Facility: HOSPITAL | Age: 50
End: 2025-04-28
Payer: MEDICAID

## 2025-05-05 ENCOUNTER — HOSPITAL ENCOUNTER (OUTPATIENT)
Dept: RADIOLOGY | Facility: HOSPITAL | Age: 50
Discharge: HOME | End: 2025-05-05
Attending: PHYSICIAN ASSISTANT
Payer: MEDICAID

## 2025-05-05 PROCEDURE — 76376 3D RENDER W/INTRP POSTPROCES: CPT | Mod: TC

## 2025-05-05 PROCEDURE — 25500020 PHARM REV CODE 255: Performed by: PHYSICIAN ASSISTANT

## 2025-05-05 PROCEDURE — A9585 GADOBUTROL INJECTION: HCPCS | Performed by: PHYSICIAN ASSISTANT

## 2025-05-05 RX ORDER — GADOBUTROL 604.72 MG/ML
6 INJECTION INTRAVENOUS
Status: COMPLETED | OUTPATIENT
Start: 2025-05-05 | End: 2025-05-05

## 2025-05-05 RX ADMIN — GADOBUTROL 6 ML: 604.72 INJECTION INTRAVENOUS at 02:05

## 2025-05-08 ENCOUNTER — RESULTS FOLLOW-UP (OUTPATIENT)
Dept: HEPATOLOGY | Facility: CLINIC | Age: 50
End: 2025-05-08

## 2025-07-19 DIAGNOSIS — K76.82 HEPATIC ENCEPHALOPATHY: ICD-10-CM

## 2025-07-19 DIAGNOSIS — F40.240 CLAUSTROPHOBIA: ICD-10-CM

## 2025-07-19 DIAGNOSIS — K74.60 HEPATIC CIRRHOSIS, UNSPECIFIED HEPATIC CIRRHOSIS TYPE, UNSPECIFIED WHETHER ASCITES PRESENT: ICD-10-CM

## 2025-07-21 RX ORDER — LORAZEPAM 1 MG/1
1 TABLET ORAL ONCE
Qty: 1 TABLET | Refills: 0 | Status: SHIPPED | OUTPATIENT
Start: 2025-07-21 | End: 2025-07-21

## 2025-07-30 ENCOUNTER — TELEPHONE (OUTPATIENT)
Dept: HEPATOLOGY | Facility: CLINIC | Age: 50
End: 2025-07-30
Payer: MEDICAID

## 2025-07-30 NOTE — TELEPHONE ENCOUNTER
Patient's mom Caty ([phone]) called our office stating that patient just went through a bad breakup and is pacing the floor.  She states that patient has been drinking and she's been discouraging her against alcohol use.  Mom hopes that something could be called in to help with patient's anxiety.  I told her the PA Scheuermann was on vacation.  I spoke with patient who was tearful during conversation.  She states that her depression is moderate.  She denied having homicidal/suicidal ideations but states that she needs something for anxiety.  I stressed that PA Scheuermann was out of the office for a few days and that she needs to contact PCP today for appointment scheduling/anxiety med.

## 2025-07-31 ENCOUNTER — TELEPHONE (OUTPATIENT)
Dept: FAMILY MEDICINE | Facility: CLINIC | Age: 50
End: 2025-07-31
Payer: MEDICAID

## 2025-07-31 NOTE — TELEPHONE ENCOUNTER
----- Message from Amira sent at 7/31/2025  2:08 PM CDT -----  Contact: gabbie  Mother Gabbie calling in regarding to hard for trying to stop patient from drinking. Asking if something could be sent to the pharmacy for patient to help her calm down and not think about drinking? Mother states It is really hard to calm patient down. Patient is going through a bad break up, not eating or sleeping, pacing around the house. Please give patient mother a call.   [facility]- front   [phone]

## 2025-08-01 ENCOUNTER — OFFICE VISIT (OUTPATIENT)
Dept: FAMILY MEDICINE | Facility: CLINIC | Age: 50
End: 2025-08-01
Payer: MEDICAID

## 2025-08-01 VITALS
HEIGHT: 62 IN | WEIGHT: 102.5 LBS | TEMPERATURE: 99 F | DIASTOLIC BLOOD PRESSURE: 103 MMHG | BODY MASS INDEX: 18.86 KG/M2 | HEART RATE: 104 BPM | SYSTOLIC BLOOD PRESSURE: 139 MMHG | OXYGEN SATURATION: 99 %

## 2025-08-01 DIAGNOSIS — F41.9 ANXIETY: Primary | ICD-10-CM

## 2025-08-01 PROCEDURE — 99999 PR PBB SHADOW E&M-EST. PATIENT-LVL III: CPT | Mod: PBBFAC,,, | Performed by: NURSE PRACTITIONER

## 2025-08-01 PROCEDURE — 99213 OFFICE O/P EST LOW 20 MIN: CPT | Mod: PBBFAC,PN | Performed by: NURSE PRACTITIONER

## 2025-08-01 RX ORDER — LORAZEPAM 0.5 MG/1
0.5 TABLET ORAL EVERY 12 HOURS PRN
Qty: 30 TABLET | Refills: 0 | Status: SHIPPED | OUTPATIENT
Start: 2025-08-01 | End: 2025-08-31

## 2025-08-01 RX ORDER — MIRTAZAPINE 7.5 MG/1
7.5 TABLET, FILM COATED ORAL NIGHTLY
COMMUNITY
Start: 2025-06-11
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
## 2025-08-11 ENCOUNTER — HOSPITAL ENCOUNTER (EMERGENCY)
Facility: HOSPITAL | Age: 50
Discharge: LEFT BEFORE DISCHARGE | End: 2025-08-11
Payer: MEDICAID

## 2025-08-11 VITALS
BODY MASS INDEX: 20.24 KG/M2 | HEART RATE: 104 BPM | DIASTOLIC BLOOD PRESSURE: 104 MMHG | RESPIRATION RATE: 18 BRPM | TEMPERATURE: 98 F | WEIGHT: 110 LBS | OXYGEN SATURATION: 98 % | SYSTOLIC BLOOD PRESSURE: 127 MMHG | HEIGHT: 62 IN

## 2025-08-11 PROCEDURE — 99281 EMR DPT VST MAYX REQ PHY/QHP: CPT

## 2025-08-11 RX ORDER — ACETAMINOPHEN 325 MG/1
650 TABLET ORAL
Status: DISCONTINUED | OUTPATIENT
Start: 2025-08-11 | End: 2025-08-11 | Stop reason: HOSPADM

## 2025-08-19 ENCOUNTER — TELEPHONE (OUTPATIENT)
Dept: HEPATOLOGY | Facility: CLINIC | Age: 50
End: 2025-08-19
Payer: MEDICAID

## 2025-08-19 DIAGNOSIS — F41.9 ANXIETY: ICD-10-CM

## 2025-08-19 RX ORDER — LORAZEPAM 0.5 MG/1
0.5 TABLET ORAL EVERY 12 HOURS PRN
Qty: 30 TABLET | Refills: 0 | OUTPATIENT
Start: 2025-08-19 | End: 2025-09-18

## 2025-08-20 ENCOUNTER — TELEPHONE (OUTPATIENT)
Dept: FAMILY MEDICINE | Facility: CLINIC | Age: 50
End: 2025-08-20
Payer: MEDICAID

## 2025-08-21 ENCOUNTER — TELEPHONE (OUTPATIENT)
Dept: FAMILY MEDICINE | Facility: CLINIC | Age: 50
End: 2025-08-21
Payer: MEDICAID